# Patient Record
Sex: FEMALE | Race: WHITE | NOT HISPANIC OR LATINO | Employment: OTHER | RURAL
[De-identification: names, ages, dates, MRNs, and addresses within clinical notes are randomized per-mention and may not be internally consistent; named-entity substitution may affect disease eponyms.]

---

## 2020-04-01 ENCOUNTER — HISTORICAL (OUTPATIENT)
Dept: ADMINISTRATIVE | Facility: HOSPITAL | Age: 65
End: 2020-04-01

## 2020-04-01 LAB — VALPROATE SERPL-MCNC: 63 UG/ML (ref 50–100)

## 2020-04-15 ENCOUNTER — HISTORICAL (OUTPATIENT)
Dept: ADMINISTRATIVE | Facility: HOSPITAL | Age: 65
End: 2020-04-15

## 2020-04-15 LAB
ALBUMIN SERPL BCP-MCNC: 2.7 G/DL (ref 3.5–5)
ALBUMIN/GLOB SERPL: 0.8 {RATIO}
ALP SERPL-CCNC: 49 U/L (ref 55–142)
ALT SERPL W P-5'-P-CCNC: 10 U/L (ref 13–56)
AST SERPL W P-5'-P-CCNC: 11 U/L (ref 15–37)
BASOPHILS # BLD AUTO: 0.01 X10E3/UL (ref 0–0.2)
BASOPHILS NFR BLD AUTO: 0.2 % (ref 0–1)
BILIRUB SERPL-MCNC: 0.4 MG/DL (ref 0–1.2)
BUN SERPL-MCNC: 11 MG/DL (ref 7–18)
BUN/CREAT SERPL: 19
CALCIUM SERPL-MCNC: 8.1 MG/DL (ref 8.5–10.1)
CHLORIDE SERPL-SCNC: 110 MMOL/L (ref 98–107)
CO2 SERPL-SCNC: 25 MMOL/L (ref 21–32)
CREAT SERPL-MCNC: 0.58 MG/DL (ref 0.5–1.02)
EOSINOPHIL # BLD AUTO: 0.06 X10E3/UL (ref 0–0.5)
EOSINOPHIL NFR BLD AUTO: 1 % (ref 1–4)
ERYTHROCYTE [DISTWIDTH] IN BLOOD BY AUTOMATED COUNT: 12.4 % (ref 11.5–14.5)
GLOBULIN SER-MCNC: 3.3 G/DL (ref 2–4)
GLUCOSE SERPL-MCNC: 81 MG/DL (ref 74–106)
HCT VFR BLD AUTO: 37.5 % (ref 38–47)
HGB BLD-MCNC: 12 G/DL (ref 12–16)
IMM GRANULOCYTES # BLD AUTO: 0.02 X10E3/UL (ref 0–0.04)
IMM GRANULOCYTES NFR BLD: 0.3 % (ref 0–0.4)
LDH SERPL-CCNC: 130 U/L (ref 84–246)
LYMPHOCYTES # BLD AUTO: 1.68 X10E3/UL (ref 1–4.8)
LYMPHOCYTES NFR BLD AUTO: 28.2 % (ref 27–41)
MCH RBC QN AUTO: 32 PG (ref 27–31)
MCHC RBC AUTO-ENTMCNC: 32 G/DL (ref 32–36)
MCV RBC AUTO: 100 FL (ref 80–96)
MONOCYTES # BLD AUTO: 0.78 X10E3/UL (ref 0–0.8)
MONOCYTES NFR BLD AUTO: 13.1 % (ref 2–6)
MPC BLD CALC-MCNC: 11.8 FL (ref 9.4–12.4)
NEUTROPHILS # BLD AUTO: 3.41 X10E3/UL (ref 1.8–7.7)
NEUTROPHILS NFR BLD AUTO: 57.2 % (ref 53–65)
NRBC # BLD AUTO: 0 X10E3/UL (ref 0–0)
NRBC, AUTO (.00): 0 /100 (ref 0–0)
PLATELET # BLD AUTO: 109 X10E3/UL (ref 150–400)
POTASSIUM SERPL-SCNC: 3.5 MMOL/L (ref 3.5–5.1)
PROT SERPL-MCNC: 6 G/DL (ref 6.4–8.2)
RBC # BLD AUTO: 3.75 X10E6/UL (ref 4.2–5.4)
SODIUM SERPL-SCNC: 142 MMOL/L (ref 136–145)
WBC # BLD AUTO: 5.96 X10E3/UL (ref 4.5–11)

## 2020-04-21 ENCOUNTER — HISTORICAL (OUTPATIENT)
Dept: ADMINISTRATIVE | Facility: HOSPITAL | Age: 65
End: 2020-04-21

## 2020-04-21 LAB
ALBUMIN SERPL BCP-MCNC: 2.8 G/DL (ref 3.5–5)
ALBUMIN/GLOB SERPL: 0.9 {RATIO}
ALP SERPL-CCNC: 55 U/L (ref 55–142)
ALT SERPL W P-5'-P-CCNC: 15 U/L (ref 13–56)
AST SERPL W P-5'-P-CCNC: 17 U/L (ref 15–37)
BASOPHILS # BLD AUTO: 0.01 X10E3/UL (ref 0–0.2)
BASOPHILS NFR BLD AUTO: 0.2 % (ref 0–1)
BILIRUB SERPL-MCNC: 0.3 MG/DL (ref 0–1.2)
BUN SERPL-MCNC: 10 MG/DL (ref 7–18)
BUN/CREAT SERPL: 17
CALCIUM SERPL-MCNC: 8.2 MG/DL (ref 8.5–10.1)
CHLORIDE SERPL-SCNC: 107 MMOL/L (ref 98–107)
CO2 SERPL-SCNC: 27 MMOL/L (ref 21–32)
CREAT SERPL-MCNC: 0.58 MG/DL (ref 0.5–1.02)
EOSINOPHIL # BLD AUTO: 0.08 X10E3/UL (ref 0–0.5)
EOSINOPHIL NFR BLD AUTO: 1.3 % (ref 1–4)
ERYTHROCYTE [DISTWIDTH] IN BLOOD BY AUTOMATED COUNT: 12.1 % (ref 11.5–14.5)
GLOBULIN SER-MCNC: 3 G/DL (ref 2–4)
GLUCOSE SERPL-MCNC: 89 MG/DL (ref 74–106)
HCT VFR BLD AUTO: 37 % (ref 38–47)
HGB BLD-MCNC: 12.2 G/DL (ref 12–16)
IMM GRANULOCYTES # BLD AUTO: 0.01 X10E3/UL (ref 0–0.04)
IMM GRANULOCYTES NFR BLD: 0.2 % (ref 0–0.4)
LYMPHOCYTES # BLD AUTO: 1.54 X10E3/UL (ref 1–4.8)
LYMPHOCYTES NFR BLD AUTO: 24.3 % (ref 27–41)
MCH RBC QN AUTO: 32.1 PG (ref 27–31)
MCHC RBC AUTO-ENTMCNC: 33 G/DL (ref 32–36)
MCV RBC AUTO: 97.4 FL (ref 80–96)
MONOCYTES # BLD AUTO: 0.81 X10E3/UL (ref 0–0.8)
MONOCYTES NFR BLD AUTO: 12.8 % (ref 2–6)
MPC BLD CALC-MCNC: 11.4 FL (ref 9.4–12.4)
NEUTROPHILS # BLD AUTO: 3.88 X10E3/UL (ref 1.8–7.7)
NEUTROPHILS NFR BLD AUTO: 61.2 % (ref 53–65)
NRBC # BLD AUTO: 0 X10E3/UL (ref 0–0)
NRBC, AUTO (.00): 0 /100 (ref 0–0)
PLATELET # BLD AUTO: 171 X10E3/UL (ref 150–400)
POTASSIUM SERPL-SCNC: 3.9 MMOL/L (ref 3.5–5.1)
PROT SERPL-MCNC: 5.8 G/DL (ref 6.4–8.2)
RBC # BLD AUTO: 3.8 X10E6/UL (ref 4.2–5.4)
SODIUM SERPL-SCNC: 141 MMOL/L (ref 136–145)
WBC # BLD AUTO: 6.33 X10E3/UL (ref 4.5–11)

## 2020-05-18 ENCOUNTER — HISTORICAL (OUTPATIENT)
Dept: ADMINISTRATIVE | Facility: HOSPITAL | Age: 65
End: 2020-05-18

## 2020-05-18 LAB
ALBUMIN SERPL BCP-MCNC: 3.2 G/DL (ref 3.5–5)
ALBUMIN/GLOB SERPL: 1.1 {RATIO}
ALP SERPL-CCNC: 63 U/L (ref 55–142)
ALT SERPL W P-5'-P-CCNC: 15 U/L (ref 13–56)
AST SERPL W P-5'-P-CCNC: 21 U/L (ref 15–37)
BASOPHILS # BLD AUTO: 0 X10E3/UL (ref 0–0.2)
BASOPHILS NFR BLD AUTO: 0 % (ref 0–1)
BILIRUB SERPL-MCNC: 0.3 MG/DL (ref 0–1.2)
BUN SERPL-MCNC: 10 MG/DL (ref 7–18)
BUN/CREAT SERPL: 11
CALCIUM SERPL-MCNC: 8.6 MG/DL (ref 8.5–10.1)
CHLORIDE SERPL-SCNC: 107 MMOL/L (ref 98–107)
CO2 SERPL-SCNC: 24 MMOL/L (ref 21–32)
CREAT SERPL-MCNC: 0.9 MG/DL (ref 0.5–1.02)
EOSINOPHIL # BLD AUTO: 0.13 X10E3/UL (ref 0–0.5)
EOSINOPHIL NFR BLD AUTO: 3.8 % (ref 1–4)
ERYTHROCYTE [DISTWIDTH] IN BLOOD BY AUTOMATED COUNT: 12.4 % (ref 11.5–14.5)
GLOBULIN SER-MCNC: 2.9 G/DL (ref 2–4)
GLUCOSE SERPL-MCNC: 83 MG/DL (ref 74–106)
HCT VFR BLD AUTO: 38.2 % (ref 38–47)
HGB BLD-MCNC: 12.5 G/DL (ref 12–16)
IMM GRANULOCYTES # BLD AUTO: 0 X10E3/UL (ref 0–0.04)
IMM GRANULOCYTES NFR BLD: 0 % (ref 0–0.4)
LYMPHOCYTES # BLD AUTO: 1.62 X10E3/UL (ref 1–4.8)
LYMPHOCYTES NFR BLD AUTO: 47.1 % (ref 27–41)
MCH RBC QN AUTO: 32 PG (ref 27–31)
MCHC RBC AUTO-ENTMCNC: 32.7 G/DL (ref 32–36)
MCV RBC AUTO: 97.7 FL (ref 80–96)
MONOCYTES # BLD AUTO: 0.35 X10E3/UL (ref 0–0.8)
MONOCYTES NFR BLD AUTO: 10.2 % (ref 2–6)
MPC BLD CALC-MCNC: 12.1 FL (ref 9.4–12.4)
NEUTROPHILS # BLD AUTO: 1.34 X10E3/UL (ref 1.8–7.7)
NEUTROPHILS NFR BLD AUTO: 38.9 % (ref 53–65)
NRBC # BLD AUTO: 0 X10E3/UL (ref 0–0)
NRBC, AUTO (.00): 0 /100 (ref 0–0)
PLATELET # BLD AUTO: 117 X10E3/UL (ref 150–400)
POTASSIUM SERPL-SCNC: 4.2 MMOL/L (ref 3.5–5.1)
PROT SERPL-MCNC: 6.1 G/DL (ref 6.4–8.2)
RBC # BLD AUTO: 3.91 X10E6/UL (ref 4.2–5.4)
SODIUM SERPL-SCNC: 141 MMOL/L (ref 136–145)
VALPROATE SERPL-MCNC: 52 UG/ML (ref 50–100)
WBC # BLD AUTO: 3.44 X10E3/UL (ref 4.5–11)

## 2020-05-20 ENCOUNTER — HISTORICAL (OUTPATIENT)
Dept: ADMINISTRATIVE | Facility: HOSPITAL | Age: 65
End: 2020-05-20

## 2020-05-20 LAB — VALPROATE SERPL-MCNC: 25 UG/ML (ref 50–100)

## 2020-06-01 ENCOUNTER — HISTORICAL (OUTPATIENT)
Dept: ADMINISTRATIVE | Facility: HOSPITAL | Age: 65
End: 2020-06-01

## 2020-06-01 LAB
BASOPHILS # BLD AUTO: 0.02 X10E3/UL (ref 0–0.2)
BASOPHILS NFR BLD AUTO: 0.3 % (ref 0–1)
BUN SERPL-MCNC: 13 MG/DL (ref 7–18)
CALCIUM SERPL-MCNC: 9.4 MG/DL (ref 8.5–10.1)
CHLORIDE SERPL-SCNC: 104 MMOL/L (ref 98–107)
CO2 SERPL-SCNC: 30 MMOL/L (ref 21–32)
CREAT SERPL-MCNC: 0.89 MG/DL (ref 0.55–1.02)
EOSINOPHIL # BLD AUTO: 0.08 X10E3/UL (ref 0–0.5)
EOSINOPHIL NFR BLD AUTO: 1.2 % (ref 1–4)
ERYTHROCYTE [DISTWIDTH] IN BLOOD BY AUTOMATED COUNT: 12.2 % (ref 11.5–14.5)
GLUCOSE SERPL-MCNC: 117 MG/DL (ref 74–106)
HCT VFR BLD AUTO: 42.7 % (ref 38–47)
HGB BLD-MCNC: 13.7 G/DL (ref 12–16)
IMM GRANULOCYTES # BLD AUTO: 0.03 X10E3/UL (ref 0–0.04)
IMM GRANULOCYTES NFR BLD: 0.5 % (ref 0–0.4)
LYMPHOCYTES # BLD AUTO: 1.33 X10E3/UL (ref 1–4.8)
LYMPHOCYTES NFR BLD AUTO: 20 % (ref 27–41)
MCH RBC QN AUTO: 32.2 PG (ref 27–31)
MCHC RBC AUTO-ENTMCNC: 32.1 G/DL (ref 32–36)
MCV RBC AUTO: 100.5 FL (ref 80–96)
MONOCYTES # BLD AUTO: 0.54 X10E3/UL (ref 0–0.8)
MONOCYTES NFR BLD AUTO: 8.1 % (ref 2–6)
MPC BLD CALC-MCNC: 11 FL (ref 9.4–12.4)
NEUTROPHILS # BLD AUTO: 4.65 X10E3/UL (ref 1.8–7.7)
NEUTROPHILS NFR BLD AUTO: 69.9 % (ref 53–65)
NRBC # BLD AUTO: 0 X10E3/UL (ref 0–0)
NRBC, AUTO (.00): 0 /100 (ref 0–0)
PLATELET # BLD AUTO: 174 X10E3/UL (ref 150–400)
POTASSIUM SERPL-SCNC: 3.7 MMOL/L (ref 3.5–5.1)
RBC # BLD AUTO: 4.25 X10E6/UL (ref 4.2–5.4)
SODIUM SERPL-SCNC: 139 MMOL/L (ref 136–145)
VALPROATE SERPL-MCNC: 44 UG/ML (ref 50–150)
WBC # BLD AUTO: 6.65 X10E3/UL (ref 4.5–11)

## 2020-06-02 ENCOUNTER — HISTORICAL (OUTPATIENT)
Dept: ADMINISTRATIVE | Facility: HOSPITAL | Age: 65
End: 2020-06-02

## 2020-06-03 LAB
ALBUMIN SERPL BCP-MCNC: 3.2 G/DL (ref 3.5–5)
ALBUMIN/GLOB SERPL: 1.1 {RATIO}
ALP SERPL-CCNC: 61 U/L (ref 55–142)
ALT SERPL W P-5'-P-CCNC: 15 U/L (ref 13–56)
AST SERPL W P-5'-P-CCNC: 20 U/L (ref 15–37)
BASOPHILS # BLD AUTO: 0.02 X10E3/UL (ref 0–0.2)
BASOPHILS NFR BLD AUTO: 0.4 % (ref 0–1)
BILIRUB SERPL-MCNC: 0.4 MG/DL (ref 0–1.2)
BUN SERPL-MCNC: 15 MG/DL (ref 7–18)
BUN/CREAT SERPL: 18
CALCIUM SERPL-MCNC: 8.3 MG/DL (ref 8.5–10.1)
CHLORIDE SERPL-SCNC: 107 MMOL/L (ref 98–107)
CO2 SERPL-SCNC: 28 MMOL/L (ref 21–32)
CREAT SERPL-MCNC: 0.85 MG/DL (ref 0.5–1.02)
EOSINOPHIL # BLD AUTO: 0.13 X10E3/UL (ref 0–0.5)
EOSINOPHIL NFR BLD AUTO: 2.5 % (ref 1–4)
ERYTHROCYTE [DISTWIDTH] IN BLOOD BY AUTOMATED COUNT: 12.7 % (ref 11.5–14.5)
GLOBULIN SER-MCNC: 2.8 G/DL (ref 2–4)
GLUCOSE SERPL-MCNC: 86 MG/DL (ref 74–106)
HCT VFR BLD AUTO: 36.3 % (ref 38–47)
HGB BLD-MCNC: 11.7 G/DL (ref 12–16)
IMM GRANULOCYTES # BLD AUTO: 0.01 X10E3/UL (ref 0–0.04)
IMM GRANULOCYTES NFR BLD: 0.2 % (ref 0–0.4)
LYMPHOCYTES # BLD AUTO: 1.93 X10E3/UL (ref 1–4.8)
LYMPHOCYTES NFR BLD AUTO: 37.6 % (ref 27–41)
MCH RBC QN AUTO: 32.3 PG (ref 27–31)
MCHC RBC AUTO-ENTMCNC: 32.2 G/DL (ref 32–36)
MCV RBC AUTO: 100.3 FL (ref 80–96)
MONOCYTES # BLD AUTO: 0.43 X10E3/UL (ref 0–0.8)
MONOCYTES NFR BLD AUTO: 8.4 % (ref 2–6)
MPC BLD CALC-MCNC: 12.3 FL (ref 9.4–12.4)
NEUTROPHILS # BLD AUTO: 2.61 X10E3/UL (ref 1.8–7.7)
NEUTROPHILS NFR BLD AUTO: 50.9 % (ref 53–65)
NRBC # BLD AUTO: 0 X10E3/UL (ref 0–0)
NRBC, AUTO (.00): 0 /100 (ref 0–0)
PLATELET # BLD AUTO: 182 X10E3/UL (ref 150–400)
POTASSIUM SERPL-SCNC: 4.1 MMOL/L (ref 3.5–5.1)
PROT SERPL-MCNC: 6 G/DL (ref 6.4–8.2)
RBC # BLD AUTO: 3.62 X10E6/UL (ref 4.2–5.4)
SODIUM SERPL-SCNC: 140 MMOL/L (ref 136–145)
VALPROATE SERPL-MCNC: 48 UG/ML (ref 50–100)
WBC # BLD AUTO: 5.13 X10E3/UL (ref 4.5–11)

## 2020-06-15 ENCOUNTER — HISTORICAL (OUTPATIENT)
Dept: ADMINISTRATIVE | Facility: HOSPITAL | Age: 65
End: 2020-06-15

## 2020-06-15 LAB
ALBUMIN SERPL BCP-MCNC: 3 G/DL (ref 3.5–5)
ALBUMIN/GLOB SERPL: 1.1 {RATIO}
ALP SERPL-CCNC: 56 U/L (ref 55–142)
ALT SERPL W P-5'-P-CCNC: 8 U/L (ref 13–56)
AST SERPL W P-5'-P-CCNC: 12 U/L (ref 15–37)
BASOPHILS # BLD AUTO: 0.02 X10E3/UL (ref 0–0.2)
BASOPHILS NFR BLD AUTO: 0.3 % (ref 0–1)
BILIRUB SERPL-MCNC: 0.4 MG/DL (ref 0–1.2)
BUN SERPL-MCNC: 13 MG/DL (ref 7–18)
BUN/CREAT SERPL: 19
CALCIUM SERPL-MCNC: 8.1 MG/DL (ref 8.5–10.1)
CHLORIDE SERPL-SCNC: 108 MMOL/L (ref 98–107)
CHOLEST SERPL-MCNC: 161 MG/DL
CHOLEST/HDLC SERPL: 3.6 {RATIO}
CO2 SERPL-SCNC: 27 MMOL/L (ref 21–32)
CREAT SERPL-MCNC: 0.68 MG/DL (ref 0.5–1.02)
EOSINOPHIL # BLD AUTO: 0.14 X10E3/UL (ref 0–0.5)
EOSINOPHIL NFR BLD AUTO: 2.3 % (ref 1–4)
ERYTHROCYTE [DISTWIDTH] IN BLOOD BY AUTOMATED COUNT: 12.5 % (ref 11.5–14.5)
EST. AVERAGE GLUCOSE BLD GHB EST-MCNC: 87 MG/DL
GLOBULIN SER-MCNC: 2.7 G/DL (ref 2–4)
GLUCOSE SERPL-MCNC: 77 MG/DL (ref 74–106)
HBA1C MFR BLD HPLC: 5.2 %
HCT VFR BLD AUTO: 38.2 % (ref 38–47)
HDLC SERPL-MCNC: 45 MG/DL
HGB BLD-MCNC: 12.4 G/DL (ref 12–16)
IMM GRANULOCYTES # BLD AUTO: 0.02 X10E3/UL (ref 0–0.04)
IMM GRANULOCYTES NFR BLD: 0.3 % (ref 0–0.4)
LDLC SERPL CALC-MCNC: 103 MG/DL
LYMPHOCYTES # BLD AUTO: 1.62 X10E3/UL (ref 1–4.8)
LYMPHOCYTES NFR BLD AUTO: 26.1 % (ref 27–41)
MCH RBC QN AUTO: 31.8 PG (ref 27–31)
MCHC RBC AUTO-ENTMCNC: 32.5 G/DL (ref 32–36)
MCV RBC AUTO: 97.9 FL (ref 80–96)
MONOCYTES # BLD AUTO: 0.61 X10E3/UL (ref 0–0.8)
MONOCYTES NFR BLD AUTO: 9.8 % (ref 2–6)
MPC BLD CALC-MCNC: 11.9 FL (ref 9.4–12.4)
NEUTROPHILS # BLD AUTO: 3.8 X10E3/UL (ref 1.8–7.7)
NEUTROPHILS NFR BLD AUTO: 61.2 % (ref 53–65)
NRBC # BLD AUTO: 0 X10E3/UL (ref 0–0)
NRBC, AUTO (.00): 0 /100 (ref 0–0)
PLATELET # BLD AUTO: 148 X10E3/UL (ref 150–400)
POTASSIUM SERPL-SCNC: 4 MMOL/L (ref 3.5–5.1)
PROT SERPL-MCNC: 5.7 G/DL (ref 6.4–8.2)
RBC # BLD AUTO: 3.9 X10E6/UL (ref 4.2–5.4)
SODIUM SERPL-SCNC: 140 MMOL/L (ref 136–145)
TRIGL SERPL-MCNC: 65 MG/DL
VALPROATE SERPL-MCNC: 96 UG/ML (ref 50–100)
WBC # BLD AUTO: 6.21 X10E3/UL (ref 4.5–11)

## 2020-07-08 ENCOUNTER — HISTORICAL (OUTPATIENT)
Dept: ADMINISTRATIVE | Facility: HOSPITAL | Age: 65
End: 2020-07-08

## 2020-07-08 LAB — VALPROATE SERPL-MCNC: 86 UG/ML (ref 50–100)

## 2020-08-03 ENCOUNTER — HISTORICAL (OUTPATIENT)
Dept: ADMINISTRATIVE | Facility: HOSPITAL | Age: 65
End: 2020-08-03

## 2020-08-03 LAB — VALPROATE SERPL-MCNC: 100 UG/ML (ref 50–100)

## 2020-08-07 ENCOUNTER — HISTORICAL (OUTPATIENT)
Dept: ADMINISTRATIVE | Facility: HOSPITAL | Age: 65
End: 2020-08-07

## 2020-08-07 LAB — SARS-COV+SARS-COV-2 AG RESP QL IA.RAPID: NEGATIVE

## 2020-08-13 ENCOUNTER — HISTORICAL (OUTPATIENT)
Dept: ADMINISTRATIVE | Facility: HOSPITAL | Age: 65
End: 2020-08-13

## 2020-08-14 LAB — SARS-COV+SARS-COV-2 AG RESP QL IA.RAPID: NEGATIVE

## 2020-08-19 ENCOUNTER — HISTORICAL (OUTPATIENT)
Dept: ADMINISTRATIVE | Facility: HOSPITAL | Age: 65
End: 2020-08-19

## 2020-08-20 LAB — SARS-COV+SARS-COV-2 AG RESP QL IA.RAPID: NEGATIVE

## 2020-08-26 ENCOUNTER — HISTORICAL (OUTPATIENT)
Dept: ADMINISTRATIVE | Facility: HOSPITAL | Age: 65
End: 2020-08-26

## 2020-08-27 ENCOUNTER — HISTORICAL (OUTPATIENT)
Dept: ADMINISTRATIVE | Facility: HOSPITAL | Age: 65
End: 2020-08-27

## 2020-08-27 LAB
SARS-COV+SARS-COV-2 AG RESP QL IA.RAPID: NEGATIVE
VALPROATE SERPL-MCNC: 81 UG/ML (ref 50–100)

## 2020-09-01 ENCOUNTER — HISTORICAL (OUTPATIENT)
Dept: ADMINISTRATIVE | Facility: HOSPITAL | Age: 65
End: 2020-09-01

## 2020-09-01 LAB — VALPROATE SERPL-MCNC: 89 UG/ML (ref 50–100)

## 2020-09-02 ENCOUNTER — HISTORICAL (OUTPATIENT)
Dept: ADMINISTRATIVE | Facility: HOSPITAL | Age: 65
End: 2020-09-02

## 2020-09-03 LAB — SARS-COV+SARS-COV-2 AG RESP QL IA.RAPID: NEGATIVE

## 2020-09-09 ENCOUNTER — HISTORICAL (OUTPATIENT)
Dept: ADMINISTRATIVE | Facility: HOSPITAL | Age: 65
End: 2020-09-09

## 2020-09-10 LAB — SARS-COV+SARS-COV-2 AG RESP QL IA.RAPID: NEGATIVE

## 2020-09-16 ENCOUNTER — HISTORICAL (OUTPATIENT)
Dept: ADMINISTRATIVE | Facility: HOSPITAL | Age: 65
End: 2020-09-16

## 2020-09-17 LAB — SARS-COV+SARS-COV-2 AG RESP QL IA.RAPID: NEGATIVE

## 2020-09-24 ENCOUNTER — HISTORICAL (OUTPATIENT)
Dept: ADMINISTRATIVE | Facility: HOSPITAL | Age: 65
End: 2020-09-24

## 2020-09-25 LAB — SARS-COV+SARS-COV-2 AG RESP QL IA.RAPID: NEGATIVE

## 2020-09-30 ENCOUNTER — HISTORICAL (OUTPATIENT)
Dept: ADMINISTRATIVE | Facility: HOSPITAL | Age: 65
End: 2020-09-30

## 2020-09-30 LAB — SARS-COV+SARS-COV-2 AG RESP QL IA.RAPID: NEGATIVE

## 2020-10-01 ENCOUNTER — HISTORICAL (OUTPATIENT)
Dept: ADMINISTRATIVE | Facility: HOSPITAL | Age: 65
End: 2020-10-01

## 2020-10-01 LAB — VALPROATE SERPL-MCNC: 113 UG/ML (ref 50–100)

## 2020-10-07 ENCOUNTER — HISTORICAL (OUTPATIENT)
Dept: ADMINISTRATIVE | Facility: HOSPITAL | Age: 65
End: 2020-10-07

## 2020-10-08 LAB — SARS-COV+SARS-COV-2 AG RESP QL IA.RAPID: NEGATIVE

## 2020-10-13 ENCOUNTER — HISTORICAL (OUTPATIENT)
Dept: ADMINISTRATIVE | Facility: HOSPITAL | Age: 65
End: 2020-10-13

## 2020-10-13 LAB
ALBUMIN SERPL BCP-MCNC: 3.4 G/DL (ref 3.5–5)
ALBUMIN/GLOB SERPL: 1 {RATIO}
ALP SERPL-CCNC: 62 U/L (ref 55–142)
ALT SERPL W P-5'-P-CCNC: 23 U/L (ref 13–56)
ANION GAP SERPL CALCULATED.3IONS-SCNC: 11 MMOL/L (ref 7–16)
AST SERPL W P-5'-P-CCNC: 21 U/L (ref 15–37)
BASOPHILS # BLD AUTO: 0.02 X10E3/UL (ref 0–0.2)
BASOPHILS NFR BLD AUTO: 0.4 % (ref 0–1)
BILIRUB SERPL-MCNC: 0.4 MG/DL (ref 0–1.2)
BUN SERPL-MCNC: 18 MG/DL (ref 7–18)
BUN/CREAT SERPL: 21
CALCIUM SERPL-MCNC: 8.8 MG/DL (ref 8.5–10.1)
CHLORIDE SERPL-SCNC: 106 MMOL/L (ref 98–107)
CO2 SERPL-SCNC: 27 MMOL/L (ref 21–32)
CREAT SERPL-MCNC: 0.87 MG/DL (ref 0.5–1.02)
EOSINOPHIL # BLD AUTO: 0.1 X10E3/UL (ref 0–0.5)
EOSINOPHIL NFR BLD AUTO: 2 % (ref 1–4)
ERYTHROCYTE [DISTWIDTH] IN BLOOD BY AUTOMATED COUNT: 12.9 % (ref 11.5–14.5)
GLOBULIN SER-MCNC: 3.3 G/DL (ref 2–4)
GLUCOSE SERPL-MCNC: 97 MG/DL (ref 74–106)
HCT VFR BLD AUTO: 41.4 % (ref 38–47)
HGB BLD-MCNC: 13.5 G/DL (ref 12–16)
IMM GRANULOCYTES # BLD AUTO: 0 X10E3/UL (ref 0–0.04)
IMM GRANULOCYTES NFR BLD: 0 % (ref 0–0.4)
LYMPHOCYTES # BLD AUTO: 1.67 X10E3/UL (ref 1–4.8)
LYMPHOCYTES NFR BLD AUTO: 33.5 % (ref 27–41)
MCH RBC QN AUTO: 32.8 PG (ref 27–31)
MCHC RBC AUTO-ENTMCNC: 32.6 G/DL (ref 32–36)
MCV RBC AUTO: 100.7 FL (ref 80–96)
MONOCYTES # BLD AUTO: 0.48 X10E3/UL (ref 0–0.8)
MONOCYTES NFR BLD AUTO: 9.6 % (ref 2–6)
MPC BLD CALC-MCNC: 11.9 FL (ref 9.4–12.4)
NEUTROPHILS # BLD AUTO: 2.71 X10E3/UL (ref 1.8–7.7)
NEUTROPHILS NFR BLD AUTO: 54.5 % (ref 53–65)
NRBC # BLD AUTO: 0 X10E3/UL (ref 0–0)
NRBC, AUTO (.00): 0 /100 (ref 0–0)
PLATELET # BLD AUTO: 148 X10E3/UL (ref 150–400)
POTASSIUM SERPL-SCNC: 4 MMOL/L (ref 3.5–5.1)
PROT SERPL-MCNC: 6.7 G/DL (ref 6.4–8.2)
RBC # BLD AUTO: 4.11 X10E6/UL (ref 4.2–5.4)
SODIUM SERPL-SCNC: 140 MMOL/L (ref 136–145)
VALPROATE SERPL-MCNC: 91 UG/ML (ref 50–100)
WBC # BLD AUTO: 4.98 X10E3/UL (ref 4.5–11)

## 2020-10-14 ENCOUNTER — HISTORICAL (OUTPATIENT)
Dept: ADMINISTRATIVE | Facility: HOSPITAL | Age: 65
End: 2020-10-14

## 2020-10-15 LAB — SARS-COV-2 RNA AMPLIFICATION, QUAL: NEGATIVE

## 2020-10-19 ENCOUNTER — HISTORICAL (OUTPATIENT)
Dept: ADMINISTRATIVE | Facility: HOSPITAL | Age: 65
End: 2020-10-19

## 2020-10-19 LAB — VALPROATE SERPL-MCNC: 90 UG/ML (ref 50–100)

## 2020-10-21 ENCOUNTER — HISTORICAL (OUTPATIENT)
Dept: ADMINISTRATIVE | Facility: HOSPITAL | Age: 65
End: 2020-10-21

## 2020-10-22 LAB — SARS-COV+SARS-COV-2 AG RESP QL IA.RAPID: NEGATIVE

## 2020-10-28 ENCOUNTER — HISTORICAL (OUTPATIENT)
Dept: ADMINISTRATIVE | Facility: HOSPITAL | Age: 65
End: 2020-10-28

## 2020-10-29 LAB — SARS-COV+SARS-COV-2 AG RESP QL IA.RAPID: NEGATIVE

## 2020-11-03 ENCOUNTER — HISTORICAL (OUTPATIENT)
Dept: ADMINISTRATIVE | Facility: HOSPITAL | Age: 65
End: 2020-11-03

## 2020-11-03 LAB — VALPROATE SERPL-MCNC: 76 UG/ML (ref 50–100)

## 2020-11-04 ENCOUNTER — HISTORICAL (OUTPATIENT)
Dept: ADMINISTRATIVE | Facility: HOSPITAL | Age: 65
End: 2020-11-04

## 2020-11-05 LAB — SARS-COV+SARS-COV-2 AG RESP QL IA.RAPID: NEGATIVE

## 2020-11-10 ENCOUNTER — HISTORICAL (OUTPATIENT)
Dept: ADMINISTRATIVE | Facility: HOSPITAL | Age: 65
End: 2020-11-10

## 2020-11-11 LAB — SARS-COV+SARS-COV-2 AG RESP QL IA.RAPID: NEGATIVE

## 2020-11-18 ENCOUNTER — HISTORICAL (OUTPATIENT)
Dept: ADMINISTRATIVE | Facility: HOSPITAL | Age: 65
End: 2020-11-18

## 2020-11-19 LAB — SARS-COV+SARS-COV-2 AG RESP QL IA.RAPID: NEGATIVE

## 2020-11-24 ENCOUNTER — HISTORICAL (OUTPATIENT)
Dept: ADMINISTRATIVE | Facility: HOSPITAL | Age: 65
End: 2020-11-24

## 2020-11-24 LAB — SARS-COV+SARS-COV-2 AG RESP QL IA.RAPID: NEGATIVE

## 2020-12-01 ENCOUNTER — HISTORICAL (OUTPATIENT)
Dept: ADMINISTRATIVE | Facility: HOSPITAL | Age: 65
End: 2020-12-01

## 2020-12-01 LAB
ALBUMIN SERPL BCP-MCNC: 2.9 G/DL (ref 3.5–5)
ALBUMIN/GLOB SERPL: 1 {RATIO}
ALP SERPL-CCNC: 54 U/L (ref 55–142)
ALT SERPL W P-5'-P-CCNC: 11 U/L (ref 13–56)
ANION GAP SERPL CALCULATED.3IONS-SCNC: 6.7 MMOL/L (ref 7–16)
AST SERPL W P-5'-P-CCNC: 12 U/L (ref 15–37)
BASOPHILS # BLD AUTO: 0.01 X10E3/UL (ref 0–0.2)
BASOPHILS NFR BLD AUTO: 0.1 % (ref 0–1)
BILIRUB DIRECT SERPL-MCNC: 0.1 MG/DL (ref 0–0.2)
BILIRUB SERPL-MCNC: 0.4 MG/DL (ref 0–1.2)
BUN SERPL-MCNC: 16 MG/DL (ref 7–18)
BUN/CREAT SERPL: 22
CALCIUM SERPL-MCNC: 8.3 MG/DL (ref 8.5–10.1)
CHLORIDE SERPL-SCNC: 109 MMOL/L (ref 98–107)
CHOLEST SERPL-MCNC: 192 MG/DL
CHOLEST/HDLC SERPL: 3.5 {RATIO}
CO2 SERPL-SCNC: 30 MMOL/L (ref 21–32)
CREAT SERPL-MCNC: 0.74 MG/DL (ref 0.5–1.02)
EOSINOPHIL # BLD AUTO: 0.19 X10E3/UL (ref 0–0.5)
EOSINOPHIL NFR BLD AUTO: 2.4 % (ref 1–4)
ERYTHROCYTE [DISTWIDTH] IN BLOOD BY AUTOMATED COUNT: 12.8 % (ref 11.5–14.5)
EST. AVERAGE GLUCOSE BLD GHB EST-MCNC: 90 MG/DL
GLOBULIN SER-MCNC: 2.9 G/DL (ref 2–4)
GLUCOSE SERPL-MCNC: 82 MG/DL (ref 74–106)
HBA1C MFR BLD HPLC: 5.3 %
HCT VFR BLD AUTO: 37.7 % (ref 38–47)
HDLC SERPL-MCNC: 55 MG/DL
HGB BLD-MCNC: 12.6 G/DL (ref 12–16)
IMM GRANULOCYTES # BLD AUTO: 0.01 X10E3/UL (ref 0–0.04)
IMM GRANULOCYTES NFR BLD: 0.1 % (ref 0–0.4)
LDLC SERPL CALC-MCNC: 119 MG/DL
LYMPHOCYTES # BLD AUTO: 1.75 X10E3/UL (ref 1–4.8)
LYMPHOCYTES NFR BLD AUTO: 22.3 % (ref 27–41)
MCH RBC QN AUTO: 33.3 PG (ref 27–31)
MCHC RBC AUTO-ENTMCNC: 33.4 G/DL (ref 32–36)
MCV RBC AUTO: 99.7 FL (ref 80–96)
MONOCYTES # BLD AUTO: 0.5 X10E3/UL (ref 0–0.8)
MONOCYTES NFR BLD AUTO: 6.4 % (ref 2–6)
MPC BLD CALC-MCNC: 11.4 FL (ref 9.4–12.4)
NEUTROPHILS # BLD AUTO: 5.39 X10E3/UL (ref 1.8–7.7)
NEUTROPHILS NFR BLD AUTO: 68.7 % (ref 53–65)
NRBC # BLD AUTO: 0 X10E3/UL (ref 0–0)
NRBC, AUTO (.00): 0 /100 (ref 0–0)
PLATELET # BLD AUTO: 172 X10E3/UL (ref 150–400)
POTASSIUM SERPL-SCNC: 3.7 MMOL/L (ref 3.5–5.1)
PROT SERPL-MCNC: 5.8 G/DL (ref 6.4–8.2)
RBC # BLD AUTO: 3.78 X10E6/UL (ref 4.2–5.4)
SODIUM SERPL-SCNC: 142 MMOL/L (ref 136–145)
TRIGL SERPL-MCNC: 92 MG/DL
VALPROATE SERPL-MCNC: 92 UG/ML (ref 50–100)
WBC # BLD AUTO: 7.85 X10E3/UL (ref 4.5–11)

## 2020-12-03 ENCOUNTER — HISTORICAL (OUTPATIENT)
Dept: ADMINISTRATIVE | Facility: HOSPITAL | Age: 65
End: 2020-12-03

## 2020-12-04 LAB — SARS-COV+SARS-COV-2 AG RESP QL IA.RAPID: NEGATIVE

## 2020-12-10 ENCOUNTER — HISTORICAL (OUTPATIENT)
Dept: ADMINISTRATIVE | Facility: HOSPITAL | Age: 65
End: 2020-12-10

## 2020-12-11 LAB — SARS-COV+SARS-COV-2 AG RESP QL IA.RAPID: NEGATIVE

## 2020-12-15 ENCOUNTER — HISTORICAL (OUTPATIENT)
Dept: ADMINISTRATIVE | Facility: HOSPITAL | Age: 65
End: 2020-12-15

## 2020-12-15 LAB
BACTERIA #/AREA URNS HPF: ABNORMAL /HPF
BILIRUB UR QL STRIP: NEGATIVE MG/DL
CLARITY UR: ABNORMAL
COLOR UR: YELLOW
GLUCOSE UR STRIP-MCNC: NEGATIVE MG/DL
KETONES UR STRIP-SCNC: NEGATIVE MG/DL
LEUKOCYTE ESTERASE UR QL STRIP: NEGATIVE LEU/UL
NITRITE UR QL STRIP: POSITIVE
PH UR STRIP: 7 PH UNITS (ref 5–8)
PROT UR QL STRIP: NEGATIVE MG/DL
RBC # UR STRIP: NEGATIVE ERY/UL
RBC #/AREA URNS HPF: ABNORMAL /HPF (ref 0–3)
SP GR UR STRIP: 1.02 (ref 1–1.03)
SQUAMOUS #/AREA URNS LPF: ABNORMAL /LPF
UROBILINOGEN UR STRIP-ACNC: 1 EU/DL
VALPROATE SERPL-MCNC: 89 UG/ML (ref 50–100)
WBC #/AREA URNS HPF: ABNORMAL /HPF (ref 0–5)

## 2020-12-16 ENCOUNTER — HISTORICAL (OUTPATIENT)
Dept: ADMINISTRATIVE | Facility: HOSPITAL | Age: 65
End: 2020-12-16

## 2020-12-17 LAB — SARS-COV+SARS-COV-2 AG RESP QL IA.RAPID: NEGATIVE

## 2020-12-19 LAB
REPORT: 38
REPORT: NORMAL

## 2020-12-21 ENCOUNTER — HISTORICAL (OUTPATIENT)
Dept: ADMINISTRATIVE | Facility: HOSPITAL | Age: 65
End: 2020-12-21

## 2020-12-22 LAB — SARS-COV+SARS-COV-2 AG RESP QL IA.RAPID: NEGATIVE

## 2020-12-29 ENCOUNTER — HISTORICAL (OUTPATIENT)
Dept: ADMINISTRATIVE | Facility: HOSPITAL | Age: 65
End: 2020-12-29

## 2020-12-30 LAB — SARS-COV+SARS-COV-2 AG RESP QL IA.RAPID: NEGATIVE

## 2021-01-04 ENCOUNTER — HISTORICAL (OUTPATIENT)
Dept: ADMINISTRATIVE | Facility: HOSPITAL | Age: 66
End: 2021-01-04

## 2021-01-05 LAB — SARS-COV+SARS-COV-2 AG RESP QL IA.RAPID: NEGATIVE

## 2021-01-11 ENCOUNTER — HISTORICAL (OUTPATIENT)
Dept: ADMINISTRATIVE | Facility: HOSPITAL | Age: 66
End: 2021-01-11

## 2021-01-12 LAB — SARS-COV+SARS-COV-2 AG RESP QL IA.RAPID: NEGATIVE

## 2021-01-13 ENCOUNTER — HISTORICAL (OUTPATIENT)
Dept: ADMINISTRATIVE | Facility: HOSPITAL | Age: 66
End: 2021-01-13

## 2021-01-13 LAB — VALPROATE SERPL-MCNC: 97 UG/ML (ref 50–100)

## 2021-01-18 ENCOUNTER — HISTORICAL (OUTPATIENT)
Dept: ADMINISTRATIVE | Facility: HOSPITAL | Age: 66
End: 2021-01-18

## 2021-01-19 LAB — SARS-COV+SARS-COV-2 AG RESP QL IA.RAPID: NEGATIVE

## 2021-01-25 ENCOUNTER — HISTORICAL (OUTPATIENT)
Dept: ADMINISTRATIVE | Facility: HOSPITAL | Age: 66
End: 2021-01-25

## 2021-01-26 LAB — SARS-COV+SARS-COV-2 AG RESP QL IA.RAPID: NEGATIVE

## 2021-02-01 ENCOUNTER — HISTORICAL (OUTPATIENT)
Dept: ADMINISTRATIVE | Facility: HOSPITAL | Age: 66
End: 2021-02-01

## 2021-02-01 LAB — VALPROATE SERPL-MCNC: 98 UG/ML (ref 50–100)

## 2021-02-23 ENCOUNTER — HISTORICAL (OUTPATIENT)
Dept: ADMINISTRATIVE | Facility: HOSPITAL | Age: 66
End: 2021-02-23

## 2021-02-24 LAB — VALPROATE SERPL-MCNC: 87 UG/ML (ref 50–100)

## 2021-02-26 ENCOUNTER — HISTORICAL (OUTPATIENT)
Dept: ADMINISTRATIVE | Facility: HOSPITAL | Age: 66
End: 2021-02-26

## 2021-02-26 LAB
ALBUMIN SERPL BCP-MCNC: 2.9 G/DL (ref 3.5–5)
ALBUMIN/GLOB SERPL: 1.2 {RATIO}
ALP SERPL-CCNC: 56 U/L (ref 55–142)
ALT SERPL W P-5'-P-CCNC: 15 U/L (ref 13–56)
ANION GAP SERPL CALCULATED.3IONS-SCNC: 11 MMOL/L (ref 7–16)
AST SERPL W P-5'-P-CCNC: 14 U/L (ref 15–37)
BASOPHILS # BLD AUTO: 0.02 X10E3/UL (ref 0–0.2)
BASOPHILS NFR BLD AUTO: 0.5 % (ref 0–1)
BILIRUB SERPL-MCNC: 0.2 MG/DL (ref 0–1.2)
BUN SERPL-MCNC: 20 MG/DL (ref 7–18)
BUN/CREAT SERPL: 31
CALCIUM SERPL-MCNC: 7.9 MG/DL (ref 8.5–10.1)
CHLORIDE SERPL-SCNC: 109 MMOL/L (ref 98–107)
CO2 SERPL-SCNC: 26 MMOL/L (ref 21–32)
CREAT SERPL-MCNC: 0.65 MG/DL (ref 0.5–1.02)
EOSINOPHIL # BLD AUTO: 0.14 X10E3/UL (ref 0–0.5)
EOSINOPHIL NFR BLD AUTO: 3.3 % (ref 1–4)
ERYTHROCYTE [DISTWIDTH] IN BLOOD BY AUTOMATED COUNT: 13.1 % (ref 11.5–14.5)
GLOBULIN SER-MCNC: 2.5 G/DL (ref 2–4)
GLUCOSE SERPL-MCNC: 72 MG/DL (ref 74–106)
HCT VFR BLD AUTO: 33.1 % (ref 38–47)
HGB BLD-MCNC: 11.1 G/DL (ref 12–16)
IMM GRANULOCYTES # BLD AUTO: 0.01 X10E3/UL (ref 0–0.04)
IMM GRANULOCYTES NFR BLD: 0.2 % (ref 0–0.4)
LYMPHOCYTES # BLD AUTO: 2.29 X10E3/UL (ref 1–4.8)
LYMPHOCYTES NFR BLD AUTO: 54.1 % (ref 27–41)
MCH RBC QN AUTO: 32.2 PG (ref 27–31)
MCHC RBC AUTO-ENTMCNC: 33.5 G/DL (ref 32–36)
MCV RBC AUTO: 95.9 FL (ref 80–96)
MONOCYTES # BLD AUTO: 0.34 X10E3/UL (ref 0–0.8)
MONOCYTES NFR BLD AUTO: 8 % (ref 2–6)
MPC BLD CALC-MCNC: 11.5 FL (ref 9.4–12.4)
NEUTROPHILS # BLD AUTO: 1.43 X10E3/UL (ref 1.8–7.7)
NEUTROPHILS NFR BLD AUTO: 33.9 % (ref 53–65)
NRBC # BLD AUTO: 0 X10E3/UL (ref 0–0)
NRBC, AUTO (.00): 0 /100 (ref 0–0)
PLATELET # BLD AUTO: 139 X10E3/UL (ref 150–400)
POTASSIUM SERPL-SCNC: 4 MMOL/L (ref 3.5–5.1)
PROT SERPL-MCNC: 5.4 G/DL (ref 6.4–8.2)
RBC # BLD AUTO: 3.45 X10E6/UL (ref 4.2–5.4)
SODIUM SERPL-SCNC: 142 MMOL/L (ref 136–145)
VALPROATE SERPL-MCNC: 95 UG/ML (ref 50–100)
WBC # BLD AUTO: 4.23 X10E3/UL (ref 4.5–11)

## 2021-03-01 ENCOUNTER — HISTORICAL (OUTPATIENT)
Dept: ADMINISTRATIVE | Facility: HOSPITAL | Age: 66
End: 2021-03-01

## 2021-03-01 LAB — VALPROATE SERPL-MCNC: 103 UG/ML (ref 50–100)

## 2021-03-03 ENCOUNTER — HISTORICAL (OUTPATIENT)
Dept: ADMINISTRATIVE | Facility: HOSPITAL | Age: 66
End: 2021-03-03

## 2021-03-03 LAB — AMMONIA PLAS-SCNC: 42 UMOL/L (ref 11–32)

## 2021-04-06 ENCOUNTER — HISTORICAL (OUTPATIENT)
Dept: ADMINISTRATIVE | Facility: HOSPITAL | Age: 66
End: 2021-04-06

## 2021-04-07 LAB
ANION GAP SERPL CALCULATED.3IONS-SCNC: 15.4 MMOL/L (ref 7–16)
BUN SERPL-MCNC: 17 MG/DL (ref 7–18)
CALCIUM SERPL-MCNC: 8.1 MG/DL (ref 8.5–10.1)
CHLORIDE SERPL-SCNC: 106 MMOL/L (ref 98–107)
CO2 SERPL-SCNC: 22 MMOL/L (ref 21–32)
CREAT SERPL-MCNC: 0.9 MG/DL (ref 0.5–1.02)
GLUCOSE SERPL-MCNC: 118 MG/DL (ref 74–106)
POTASSIUM SERPL-SCNC: 4.4 MMOL/L (ref 3.5–5.1)
SODIUM SERPL-SCNC: 139 MMOL/L (ref 136–145)
VALPROATE SERPL-MCNC: 85 UG/ML (ref 50–100)

## 2021-08-25 ENCOUNTER — OFFICE VISIT (OUTPATIENT)
Dept: NEUROLOGY | Facility: CLINIC | Age: 66
End: 2021-08-25
Payer: MEDICARE

## 2021-08-25 VITALS
OXYGEN SATURATION: 98 % | HEART RATE: 102 BPM | HEIGHT: 65 IN | WEIGHT: 123.81 LBS | BODY MASS INDEX: 20.63 KG/M2 | SYSTOLIC BLOOD PRESSURE: 116 MMHG | DIASTOLIC BLOOD PRESSURE: 70 MMHG

## 2021-08-25 DIAGNOSIS — R56.9 CONVULSIONS, UNSPECIFIED CONVULSION TYPE: Primary | ICD-10-CM

## 2021-08-25 DIAGNOSIS — Z79.899 ON LONG TERM DRUG THERAPY: ICD-10-CM

## 2021-08-25 DIAGNOSIS — R26.89 BALANCE PROBLEM: ICD-10-CM

## 2021-08-25 PROCEDURE — 99213 OFFICE O/P EST LOW 20 MIN: CPT | Mod: S$PBB,,, | Performed by: NURSE PRACTITIONER

## 2021-08-25 PROCEDURE — 99214 OFFICE O/P EST MOD 30 MIN: CPT | Mod: PBBFAC | Performed by: NURSE PRACTITIONER

## 2021-08-25 PROCEDURE — 99213 PR OFFICE/OUTPT VISIT, EST, LEVL III, 20-29 MIN: ICD-10-PCS | Mod: S$PBB,,, | Performed by: NURSE PRACTITIONER

## 2021-08-25 RX ORDER — DIVALPROEX SODIUM 500 MG/1
250 TABLET, DELAYED RELEASE ORAL DAILY
Status: ON HOLD | COMMUNITY
Start: 2021-08-17 | End: 2021-09-27 | Stop reason: HOSPADM

## 2021-08-25 RX ORDER — LACTULOSE 10 G/15ML
60 SOLUTION ORAL 4 TIMES DAILY
Status: ON HOLD | COMMUNITY
Start: 2021-07-15 | End: 2023-05-22 | Stop reason: HOSPADM

## 2021-08-25 RX ORDER — PANTOPRAZOLE SODIUM 40 MG/1
TABLET, DELAYED RELEASE ORAL
COMMUNITY
Start: 2021-08-06 | End: 2022-01-26 | Stop reason: SDUPTHER

## 2021-08-25 RX ORDER — PALIPERIDONE PALMITATE 117 MG/.75ML
117 INJECTION INTRAMUSCULAR
COMMUNITY
Start: 2021-09-15

## 2021-08-25 RX ORDER — DOCUSATE SODIUM 100 MG/1
100 CAPSULE, LIQUID FILLED ORAL 2 TIMES DAILY
COMMUNITY

## 2021-08-25 RX ORDER — FOLIC ACID 1 MG/1
1 TABLET ORAL DAILY
COMMUNITY

## 2021-09-01 ENCOUNTER — HOSPITAL ENCOUNTER (EMERGENCY)
Facility: HOSPITAL | Age: 66
Discharge: HOME | End: 2021-09-01
Attending: EMERGENCY MEDICINE
Payer: MEDICARE

## 2021-09-01 VITALS
HEART RATE: 97 BPM | BODY MASS INDEX: 21.35 KG/M2 | DIASTOLIC BLOOD PRESSURE: 72 MMHG | RESPIRATION RATE: 18 BRPM | WEIGHT: 136 LBS | OXYGEN SATURATION: 97 % | SYSTOLIC BLOOD PRESSURE: 142 MMHG | TEMPERATURE: 99 F | HEIGHT: 67 IN

## 2021-09-01 DIAGNOSIS — R56.9 CONVULSIONS: Chronic | ICD-10-CM

## 2021-09-01 DIAGNOSIS — Z79.899 ON LONG TERM DRUG THERAPY: Chronic | ICD-10-CM

## 2021-09-01 DIAGNOSIS — S00.83XA TRAUMATIC HEMATOMA OF FOREHEAD, INITIAL ENCOUNTER: ICD-10-CM
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
## 2021-09-01 PROCEDURE — 99282 EMERGENCY DEPT VISIT SF MDM: CPT | Mod: ,,, | Performed by: EMERGENCY MEDICINE

## 2021-09-01 PROCEDURE — 99282 PR EMERGENCY DEPT VISIT,LEVEL II: ICD-10-PCS | Mod: ,,, | Performed by: EMERGENCY MEDICINE

## 2021-09-01 PROCEDURE — 99283 EMERGENCY DEPT VISIT LOW MDM: CPT

## 2021-09-21 ENCOUNTER — PROCEDURE VISIT (OUTPATIENT)
Dept: NEUROLOGY | Facility: HOSPITAL | Age: 66
End: 2021-09-21
Attending: NURSE PRACTITIONER
Payer: MEDICARE

## 2021-09-21 DIAGNOSIS — R56.9 CONVULSIONS, UNSPECIFIED CONVULSION TYPE: ICD-10-CM

## 2021-09-21 PROCEDURE — 95813 EEG EXTND MNTR 61-119 MIN: CPT | Mod: 26,,, | Performed by: PSYCHIATRY & NEUROLOGY

## 2021-09-21 PROCEDURE — 95813 PR EEG, EXTENDED, 61-119 MINS: ICD-10-PCS | Mod: 26,,, | Performed by: PSYCHIATRY & NEUROLOGY

## 2021-09-21 PROCEDURE — 95813 EEG EXTND MNTR 61-119 MIN: CPT

## 2021-09-25 ENCOUNTER — HOSPITAL ENCOUNTER (INPATIENT)
Facility: HOSPITAL | Age: 66
LOS: 1 days | Discharge: NURSING, REHAB OR LONG-TERM CARE | DRG: 381 | End: 2021-09-27
Attending: FAMILY MEDICINE | Admitting: FAMILY MEDICINE
Payer: MEDICARE

## 2021-09-25 DIAGNOSIS — F03.918 DEMENTIA WITH BEHAVIORAL DISTURBANCE: ICD-10-CM
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
## 2021-09-25 DIAGNOSIS — N39.0 UTI (URINARY TRACT INFECTION): ICD-10-CM

## 2021-09-25 DIAGNOSIS — N30.00 ACUTE CYSTITIS WITHOUT HEMATURIA: ICD-10-CM

## 2021-09-25 DIAGNOSIS — K92.0 HEMATEMESIS WITH NAUSEA: ICD-10-CM

## 2021-09-25 DIAGNOSIS — K92.2 GASTROINTESTINAL HEMORRHAGE: ICD-10-CM

## 2021-09-25 DIAGNOSIS — F03.91 DEMENTIA WITH BEHAVIORAL DISTURBANCE, UNSPECIFIED DEMENTIA TYPE: ICD-10-CM

## 2021-09-25 DIAGNOSIS — R56.9 SEIZURES: ICD-10-CM

## 2021-09-25 DIAGNOSIS — R07.9 CHEST PAIN: ICD-10-CM

## 2021-09-25 DIAGNOSIS — Z79.899 ON LONG TERM DRUG THERAPY: Chronic | ICD-10-CM
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
## 2021-09-25 DIAGNOSIS — K21.00 GASTROESOPHAGEAL REFLUX DISEASE WITH ESOPHAGITIS WITHOUT HEMORRHAGE: ICD-10-CM

## 2021-09-25 DIAGNOSIS — R56.9 SEIZURE: ICD-10-CM
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
## 2021-09-25 DIAGNOSIS — K92.2 GASTROINTESTINAL HEMORRHAGE, UNSPECIFIED GASTROINTESTINAL HEMORRHAGE TYPE: Primary | ICD-10-CM

## 2021-09-25 DIAGNOSIS — K21.9 GERD (GASTROESOPHAGEAL REFLUX DISEASE): ICD-10-CM

## 2021-09-25 DIAGNOSIS — K92.0 GASTROINTESTINAL HEMORRHAGE WITH HEMATEMESIS: ICD-10-CM

## 2021-09-25 DIAGNOSIS — K22.10 ULCER OF ESOPHAGUS WITHOUT BLEEDING: ICD-10-CM

## 2021-09-25 LAB
ALBUMIN SERPL BCP-MCNC: 3.2 G/DL (ref 3.5–5)
ALBUMIN/GLOB SERPL: 0.9 {RATIO}
ALP SERPL-CCNC: 61 U/L (ref 55–142)
ALT SERPL W P-5'-P-CCNC: 19 U/L (ref 13–56)
ANION GAP SERPL CALCULATED.3IONS-SCNC: 11 MMOL/L (ref 7–16)
APTT PPP: 32.4 SECONDS (ref 25.2–37.3)
AST SERPL W P-5'-P-CCNC: 16 U/L (ref 15–37)
BACTERIA #/AREA URNS HPF: ABNORMAL /HPF
BASOPHILS # BLD AUTO: 0.02 K/UL (ref 0–0.2)
BASOPHILS NFR BLD AUTO: 0.2 % (ref 0–1)
BILIRUB SERPL-MCNC: 0.3 MG/DL (ref 0–1.2)
BILIRUB UR QL STRIP: NEGATIVE
BUN SERPL-MCNC: 11 MG/DL (ref 7–18)
BUN/CREAT SERPL: 14 (ref 6–20)
CALCIUM SERPL-MCNC: 8.5 MG/DL (ref 8.5–10.1)
CHLORIDE SERPL-SCNC: 106 MMOL/L (ref 98–107)
CLARITY UR: CLEAR
CO2 SERPL-SCNC: 28 MMOL/L (ref 21–32)
COLOR UR: YELLOW
CREAT SERPL-MCNC: 0.77 MG/DL (ref 0.55–1.02)
DIFFERENTIAL METHOD BLD: ABNORMAL
EOSINOPHIL # BLD AUTO: 0.06 K/UL (ref 0–0.5)
EOSINOPHIL NFR BLD AUTO: 0.6 % (ref 1–4)
ERYTHROCYTE [DISTWIDTH] IN BLOOD BY AUTOMATED COUNT: 12.9 % (ref 11.5–14.5)
FLUAV AG UPPER RESP QL IA.RAPID: NEGATIVE
FLUBV AG UPPER RESP QL IA.RAPID: NEGATIVE
GLOBULIN SER-MCNC: 3.4 G/DL (ref 2–4)
GLUCOSE SERPL-MCNC: 121 MG/DL (ref 74–106)
GLUCOSE UR STRIP-MCNC: NEGATIVE MG/DL
HCT VFR BLD AUTO: 37.7 % (ref 38–47)
HGB BLD-MCNC: 12.9 G/DL (ref 12–16)
IMM GRANULOCYTES # BLD AUTO: 0.02 K/UL (ref 0–0.04)
IMM GRANULOCYTES NFR BLD: 0.2 % (ref 0–0.4)
INR BLD: 0.83 (ref 0.9–1.1)
KETONES UR STRIP-SCNC: 15 MG/DL
LEUKOCYTE ESTERASE UR QL STRIP: ABNORMAL
LYMPHOCYTES # BLD AUTO: 1.09 K/UL (ref 1–4.8)
LYMPHOCYTES NFR BLD AUTO: 10.6 % (ref 27–41)
MCH RBC QN AUTO: 32.7 PG (ref 27–31)
MCHC RBC AUTO-ENTMCNC: 34.2 G/DL (ref 32–36)
MCV RBC AUTO: 95.4 FL (ref 80–96)
MONOCYTES # BLD AUTO: 1.03 K/UL (ref 0–0.8)
MONOCYTES NFR BLD AUTO: 10 % (ref 2–6)
MPC BLD CALC-MCNC: 10.9 FL (ref 9.4–12.4)
MUCOUS THREADS #/AREA URNS HPF: ABNORMAL /HPF
NEUTROPHILS # BLD AUTO: 8.03 K/UL (ref 1.8–7.7)
NEUTROPHILS NFR BLD AUTO: 78.4 % (ref 53–65)
NITRITE UR QL STRIP: NEGATIVE
NRBC # BLD AUTO: 0 X10E3/UL
NRBC, AUTO (.00): 0 %
OCCULT BLOOD: POSITIVE
PH UR STRIP: 6 PH UNITS
PLATELET # BLD AUTO: 183 K/UL (ref 150–400)
POTASSIUM SERPL-SCNC: 4.1 MMOL/L (ref 3.5–5.1)
PROT SERPL-MCNC: 6.6 G/DL (ref 6.4–8.2)
PROT UR QL STRIP: 30
PROTHROMBIN TIME: 11.5 SECONDS (ref 11.7–14.7)
RBC # BLD AUTO: 3.95 M/UL (ref 4.2–5.4)
RBC # UR STRIP: NEGATIVE /UL
RBC #/AREA URNS HPF: ABNORMAL /HPF
SARS-COV+SARS-COV-2 AG RESP QL IA.RAPID: NEGATIVE
SODIUM SERPL-SCNC: 141 MMOL/L (ref 136–145)
SP GR UR STRIP: 1.02
SQUAMOUS #/AREA URNS LPF: ABNORMAL /LPF
TRICHOMONAS #/AREA URNS HPF: ABNORMAL /HPF
UROBILINOGEN UR STRIP-ACNC: 1 MG/DL
VALPROATE SERPL-MCNC: 35 ΜG/ML (ref 50–100)
WBC # BLD AUTO: 10.25 K/UL (ref 4.5–11)
WBC #/AREA URNS HPF: ABNORMAL /HPF
YEAST #/AREA URNS HPF: ABNORMAL /HPF

## 2021-09-25 PROCEDURE — 96376 TX/PRO/DX INJ SAME DRUG ADON: CPT | Performed by: NURSE PRACTITIONER

## 2021-09-25 PROCEDURE — 87086 URINE CULTURE/COLONY COUNT: CPT | Performed by: FAMILY MEDICINE

## 2021-09-25 PROCEDURE — 87428 SARSCOV & INF VIR A&B AG IA: CPT | Performed by: EMERGENCY MEDICINE

## 2021-09-25 PROCEDURE — G0378 HOSPITAL OBSERVATION PER HR: HCPCS

## 2021-09-25 PROCEDURE — 85610 PROTHROMBIN TIME: CPT | Performed by: FAMILY MEDICINE

## 2021-09-25 PROCEDURE — 80053 COMPREHEN METABOLIC PANEL: CPT | Performed by: FAMILY MEDICINE

## 2021-09-25 PROCEDURE — 85025 COMPLETE CBC W/AUTO DIFF WBC: CPT | Performed by: FAMILY MEDICINE

## 2021-09-25 PROCEDURE — C9113 INJ PANTOPRAZOLE SODIUM, VIA: HCPCS | Performed by: NURSE PRACTITIONER

## 2021-09-25 PROCEDURE — 63600175 PHARM REV CODE 636 W HCPCS: Performed by: NURSE PRACTITIONER

## 2021-09-25 PROCEDURE — 99285 EMERGENCY DEPT VISIT HI MDM: CPT | Mod: 25 | Performed by: FAMILY MEDICINE

## 2021-09-25 PROCEDURE — C9113 INJ PANTOPRAZOLE SODIUM, VIA: HCPCS | Performed by: FAMILY MEDICINE

## 2021-09-25 PROCEDURE — 80164 ASSAY DIPROPYLACETIC ACD TOT: CPT | Performed by: FAMILY MEDICINE

## 2021-09-25 PROCEDURE — 81001 URINALYSIS AUTO W/SCOPE: CPT | Performed by: FAMILY MEDICINE

## 2021-09-25 PROCEDURE — 63600175 PHARM REV CODE 636 W HCPCS: Performed by: FAMILY MEDICINE

## 2021-09-25 PROCEDURE — 87077 CULTURE AEROBIC IDENTIFY: CPT | Performed by: FAMILY MEDICINE

## 2021-09-25 PROCEDURE — 25000003 PHARM REV CODE 250: Performed by: FAMILY MEDICINE

## 2021-09-25 PROCEDURE — 25000003 PHARM REV CODE 250: Performed by: NURSE PRACTITIONER

## 2021-09-25 PROCEDURE — 36415 COLL VENOUS BLD VENIPUNCTURE: CPT | Performed by: FAMILY MEDICINE

## 2021-09-25 PROCEDURE — 82272 OCCULT BLD FECES 1-3 TESTS: CPT

## 2021-09-25 PROCEDURE — 85730 THROMBOPLASTIN TIME PARTIAL: CPT | Performed by: FAMILY MEDICINE

## 2021-09-25 PROCEDURE — 99283 EMERGENCY DEPT VISIT LOW MDM: CPT | Mod: ,,, | Performed by: FAMILY MEDICINE

## 2021-09-25 PROCEDURE — 27000221 HC OXYGEN, UP TO 24 HOURS

## 2021-09-25 PROCEDURE — 99283 PR EMERGENCY DEPT VISIT,LEVEL III: ICD-10-PCS | Mod: ,,, | Performed by: FAMILY MEDICINE

## 2021-09-25 PROCEDURE — 96372 THER/PROPH/DIAG INJ SC/IM: CPT | Mod: 59

## 2021-09-25 PROCEDURE — 96374 THER/PROPH/DIAG INJ IV PUSH: CPT | Performed by: FAMILY MEDICINE

## 2021-09-25 PROCEDURE — 81003 URINALYSIS AUTO W/O SCOPE: CPT | Performed by: FAMILY MEDICINE

## 2021-09-25 PROCEDURE — 99220 PR INITIAL OBSERVATION CARE,LEVL III: ICD-10-PCS | Mod: ,,, | Performed by: FAMILY MEDICINE

## 2021-09-25 PROCEDURE — 99220 PR INITIAL OBSERVATION CARE,LEVL III: CPT | Mod: ,,, | Performed by: FAMILY MEDICINE

## 2021-09-25 PROCEDURE — 96376 TX/PRO/DX INJ SAME DRUG ADON: CPT

## 2021-09-25 PROCEDURE — 94761 N-INVAS EAR/PLS OXIMETRY MLT: CPT

## 2021-09-25 RX ORDER — GLUCAGON 1 MG
1 KIT INJECTION
Status: DISCONTINUED | OUTPATIENT
Start: 2021-09-25 | End: 2021-09-27 | Stop reason: HOSPADM

## 2021-09-25 RX ORDER — NALOXONE HCL 0.4 MG/ML
0.02 VIAL (ML) INJECTION
Status: DISCONTINUED | OUTPATIENT
Start: 2021-09-25 | End: 2021-09-27 | Stop reason: HOSPADM

## 2021-09-25 RX ORDER — DIAZEPAM 10 MG/2ML
2 INJECTION INTRAMUSCULAR
Status: DISCONTINUED | OUTPATIENT
Start: 2021-09-25 | End: 2021-09-25

## 2021-09-25 RX ORDER — PANTOPRAZOLE SODIUM 40 MG/10ML
80 INJECTION, POWDER, LYOPHILIZED, FOR SOLUTION INTRAVENOUS
Status: COMPLETED | OUTPATIENT
Start: 2021-09-25 | End: 2021-09-25

## 2021-09-25 RX ORDER — DIVALPROEX SODIUM 250 MG/1
250 TABLET, DELAYED RELEASE ORAL DAILY
Status: DISCONTINUED | OUTPATIENT
Start: 2021-09-25 | End: 2021-09-25

## 2021-09-25 RX ORDER — ACETAMINOPHEN 325 MG/1
650 TABLET ORAL EVERY 4 HOURS PRN
Status: DISCONTINUED | OUTPATIENT
Start: 2021-09-25 | End: 2021-09-27 | Stop reason: HOSPADM

## 2021-09-25 RX ORDER — DIVALPROEX SODIUM 500 MG/1
500 TABLET, FILM COATED, EXTENDED RELEASE ORAL NIGHTLY
Status: DISCONTINUED | OUTPATIENT
Start: 2021-09-25 | End: 2021-09-25

## 2021-09-25 RX ORDER — DIVALPROEX SODIUM 250 MG/1
500 TABLET, DELAYED RELEASE ORAL EVERY 12 HOURS
Status: DISCONTINUED | OUTPATIENT
Start: 2021-09-25 | End: 2021-09-27 | Stop reason: HOSPADM

## 2021-09-25 RX ORDER — SODIUM CHLORIDE 9 MG/ML
INJECTION, SOLUTION INTRAVENOUS CONTINUOUS
Status: DISCONTINUED | OUTPATIENT
Start: 2021-09-25 | End: 2021-09-27 | Stop reason: HOSPADM

## 2021-09-25 RX ORDER — SODIUM CHLORIDE 0.9 % (FLUSH) 0.9 %
10 SYRINGE (ML) INJECTION EVERY 12 HOURS PRN
Status: DISCONTINUED | OUTPATIENT
Start: 2021-09-25 | End: 2021-09-27 | Stop reason: HOSPADM

## 2021-09-25 RX ORDER — DIVALPROEX SODIUM 250 MG/1
500 TABLET, DELAYED RELEASE ORAL EVERY 12 HOURS
Status: DISCONTINUED | OUTPATIENT
Start: 2021-09-25 | End: 2021-09-25

## 2021-09-25 RX ORDER — ONDANSETRON 2 MG/ML
4 INJECTION INTRAMUSCULAR; INTRAVENOUS EVERY 8 HOURS PRN
Status: DISCONTINUED | OUTPATIENT
Start: 2021-09-25 | End: 2021-09-27 | Stop reason: HOSPADM

## 2021-09-25 RX ORDER — DIVALPROEX SODIUM 500 MG/1
500 TABLET, FILM COATED, EXTENDED RELEASE ORAL NIGHTLY
Status: ON HOLD | COMMUNITY
End: 2021-09-27 | Stop reason: HOSPADM

## 2021-09-25 RX ADMIN — PANTOPRAZOLE SODIUM 8 MG/HR: 40 INJECTION, POWDER, FOR SOLUTION INTRAVENOUS at 08:09

## 2021-09-25 RX ADMIN — LIDOCAINE HYDROCHLORIDE 1 G: 10 INJECTION, SOLUTION INFILTRATION; PERINEURAL at 06:09

## 2021-09-25 RX ADMIN — SODIUM CHLORIDE: 9 INJECTION, SOLUTION INTRAVENOUS at 10:09

## 2021-09-25 RX ADMIN — PANTOPRAZOLE SODIUM 80 MG: 40 INJECTION, POWDER, FOR SOLUTION INTRAVENOUS at 03:09

## 2021-09-25 RX ADMIN — PANTOPRAZOLE SODIUM 8 MG/HR: 40 INJECTION, POWDER, FOR SOLUTION INTRAVENOUS at 03:09

## 2021-09-25 RX ADMIN — PANTOPRAZOLE SODIUM 8 MG/HR: 40 INJECTION, POWDER, FOR SOLUTION INTRAVENOUS at 10:09

## 2021-09-25 RX ADMIN — DIVALPROEX SODIUM 500 MG: 250 TABLET, DELAYED RELEASE ORAL at 08:09

## 2021-09-26 ENCOUNTER — ANESTHESIA EVENT (OUTPATIENT)
Dept: GASTROENTEROLOGY | Facility: HOSPITAL | Age: 66
DRG: 381 | End: 2021-09-26
Payer: MEDICARE

## 2021-09-26 ENCOUNTER — ANESTHESIA (OUTPATIENT)
Dept: GASTROENTEROLOGY | Facility: HOSPITAL | Age: 66
DRG: 381 | End: 2021-09-26
Payer: MEDICARE

## 2021-09-26 PROBLEM — K21.00 GASTROESOPHAGEAL REFLUX DISEASE WITH ESOPHAGITIS WITHOUT HEMORRHAGE: Status: ACTIVE | Noted: 2021-09-26

## 2021-09-26 PROBLEM — K22.10 ULCER OF ESOPHAGUS WITHOUT BLEEDING: Status: ACTIVE | Noted: 2021-09-26

## 2021-09-26 PROBLEM — N39.0 UTI (URINARY TRACT INFECTION): Status: ACTIVE | Noted: 2021-09-26

## 2021-09-26 PROBLEM — K92.0 HEMATEMESIS WITH NAUSEA: Status: ACTIVE | Noted: 2021-09-26

## 2021-09-26 LAB
ANION GAP SERPL CALCULATED.3IONS-SCNC: 8 MMOL/L (ref 7–16)
BASOPHILS # BLD AUTO: 0.02 K/UL (ref 0–0.2)
BASOPHILS NFR BLD AUTO: 0.3 % (ref 0–1)
BUN SERPL-MCNC: 8 MG/DL (ref 7–18)
BUN/CREAT SERPL: 11 (ref 6–20)
CALCIUM SERPL-MCNC: 7.9 MG/DL (ref 8.5–10.1)
CHLORIDE SERPL-SCNC: 110 MMOL/L (ref 98–107)
CO2 SERPL-SCNC: 27 MMOL/L (ref 21–32)
CREAT SERPL-MCNC: 0.7 MG/DL (ref 0.55–1.02)
DIFFERENTIAL METHOD BLD: ABNORMAL
EOSINOPHIL # BLD AUTO: 0.13 K/UL (ref 0–0.5)
EOSINOPHIL NFR BLD AUTO: 1.8 % (ref 1–4)
ERYTHROCYTE [DISTWIDTH] IN BLOOD BY AUTOMATED COUNT: 13.4 % (ref 11.5–14.5)
GLUCOSE SERPL-MCNC: 78 MG/DL (ref 74–106)
HCT VFR BLD AUTO: 31.2 % (ref 38–47)
HGB BLD-MCNC: 10.6 G/DL (ref 12–16)
IMM GRANULOCYTES # BLD AUTO: 0.02 K/UL (ref 0–0.04)
IMM GRANULOCYTES NFR BLD: 0.3 % (ref 0–0.4)
LYMPHOCYTES # BLD AUTO: 1.94 K/UL (ref 1–4.8)
LYMPHOCYTES NFR BLD AUTO: 26.6 % (ref 27–41)
MAGNESIUM SERPL-MCNC: 1.9 MG/DL (ref 1.7–2.3)
MCH RBC QN AUTO: 32.9 PG (ref 27–31)
MCHC RBC AUTO-ENTMCNC: 34 G/DL (ref 32–36)
MCV RBC AUTO: 96.9 FL (ref 80–96)
MONOCYTES # BLD AUTO: 0.64 K/UL (ref 0–0.8)
MONOCYTES NFR BLD AUTO: 8.8 % (ref 2–6)
MPC BLD CALC-MCNC: 10.8 FL (ref 9.4–12.4)
NEUTROPHILS # BLD AUTO: 4.54 K/UL (ref 1.8–7.7)
NEUTROPHILS NFR BLD AUTO: 62.2 % (ref 53–65)
NRBC # BLD AUTO: 0 X10E3/UL
NRBC, AUTO (.00): 0 %
PLATELET # BLD AUTO: 163 K/UL (ref 150–400)
POTASSIUM SERPL-SCNC: 3.3 MMOL/L (ref 3.5–5.1)
RBC # BLD AUTO: 3.22 M/UL (ref 4.2–5.4)
SODIUM SERPL-SCNC: 142 MMOL/L (ref 136–145)
WBC # BLD AUTO: 7.29 K/UL (ref 4.5–11)

## 2021-09-26 PROCEDURE — 83735 ASSAY OF MAGNESIUM: CPT | Performed by: NURSE PRACTITIONER

## 2021-09-26 PROCEDURE — 63600175 PHARM REV CODE 636 W HCPCS: Performed by: NURSE ANESTHETIST, CERTIFIED REGISTERED

## 2021-09-26 PROCEDURE — 63600175 PHARM REV CODE 636 W HCPCS: Performed by: NURSE PRACTITIONER

## 2021-09-26 PROCEDURE — 25000003 PHARM REV CODE 250: Performed by: INTERNAL MEDICINE

## 2021-09-26 PROCEDURE — 36415 COLL VENOUS BLD VENIPUNCTURE: CPT | Performed by: NURSE PRACTITIONER

## 2021-09-26 PROCEDURE — 94761 N-INVAS EAR/PLS OXIMETRY MLT: CPT

## 2021-09-26 PROCEDURE — 88312 SPECIAL STAINS GROUP 1: CPT | Mod: SUR | Performed by: INTERNAL MEDICINE

## 2021-09-26 PROCEDURE — 88305 TISSUE EXAM BY PATHOLOGIST: CPT | Mod: 26,,, | Performed by: PATHOLOGY

## 2021-09-26 PROCEDURE — 99232 SBSQ HOSP IP/OBS MODERATE 35: CPT | Mod: ,,, | Performed by: FAMILY MEDICINE

## 2021-09-26 PROCEDURE — 80048 BASIC METABOLIC PNL TOTAL CA: CPT | Performed by: NURSE PRACTITIONER

## 2021-09-26 PROCEDURE — D9220A PRA ANESTHESIA: ICD-10-PCS | Mod: CRNA,,, | Performed by: NURSE ANESTHETIST, CERTIFIED REGISTERED

## 2021-09-26 PROCEDURE — D9220A PRA ANESTHESIA: Mod: CRNA,,, | Performed by: NURSE ANESTHETIST, CERTIFIED REGISTERED

## 2021-09-26 PROCEDURE — 88312 SPECIAL STAINS GROUP 1: CPT | Mod: 26,,, | Performed by: PATHOLOGY

## 2021-09-26 PROCEDURE — D9220A PRA ANESTHESIA: Mod: ANES,ICN,, | Performed by: ANESTHESIOLOGY

## 2021-09-26 PROCEDURE — 25000003 PHARM REV CODE 250: Performed by: NURSE PRACTITIONER

## 2021-09-26 PROCEDURE — C9113 INJ PANTOPRAZOLE SODIUM, VIA: HCPCS | Performed by: NURSE PRACTITIONER

## 2021-09-26 PROCEDURE — 43239 EGD BIOPSY SINGLE/MULTIPLE: CPT

## 2021-09-26 PROCEDURE — G0378 HOSPITAL OBSERVATION PER HR: HCPCS

## 2021-09-26 PROCEDURE — 88305 SURGICAL PATHOLOGY: ICD-10-PCS | Mod: 26,,, | Performed by: PATHOLOGY

## 2021-09-26 PROCEDURE — 88312 SURGICAL PATHOLOGY: ICD-10-PCS | Mod: 26,,, | Performed by: PATHOLOGY

## 2021-09-26 PROCEDURE — 85025 COMPLETE CBC W/AUTO DIFF WBC: CPT | Performed by: NURSE PRACTITIONER

## 2021-09-26 PROCEDURE — 27000221 HC OXYGEN, UP TO 24 HOURS

## 2021-09-26 PROCEDURE — 96376 TX/PRO/DX INJ SAME DRUG ADON: CPT

## 2021-09-26 PROCEDURE — D9220A PRA ANESTHESIA: ICD-10-PCS | Mod: ANES,ICN,, | Performed by: ANESTHESIOLOGY

## 2021-09-26 PROCEDURE — 11000001 HC ACUTE MED/SURG PRIVATE ROOM

## 2021-09-26 PROCEDURE — 99232 PR SUBSEQUENT HOSPITAL CARE,LEVL II: ICD-10-PCS | Mod: ,,, | Performed by: FAMILY MEDICINE

## 2021-09-26 RX ORDER — MIDAZOLAM HYDROCHLORIDE 1 MG/ML
INJECTION INTRAMUSCULAR; INTRAVENOUS
Status: DISCONTINUED | OUTPATIENT
Start: 2021-09-26 | End: 2021-09-26

## 2021-09-26 RX ORDER — POTASSIUM CHLORIDE 7.45 MG/ML
40 INJECTION INTRAVENOUS ONCE
Status: COMPLETED | OUTPATIENT
Start: 2021-09-26 | End: 2021-09-26

## 2021-09-26 RX ORDER — PANTOPRAZOLE SODIUM 40 MG/1
40 TABLET, DELAYED RELEASE ORAL DAILY
Status: DISCONTINUED | OUTPATIENT
Start: 2021-09-26 | End: 2021-09-27 | Stop reason: HOSPADM

## 2021-09-26 RX ORDER — SODIUM CHLORIDE 0.9 % (FLUSH) 0.9 %
10 SYRINGE (ML) INJECTION
Status: CANCELLED | OUTPATIENT
Start: 2021-09-26

## 2021-09-26 RX ORDER — PROPOFOL 10 MG/ML
VIAL (ML) INTRAVENOUS
Status: DISCONTINUED | OUTPATIENT
Start: 2021-09-26 | End: 2021-09-26

## 2021-09-26 RX ADMIN — POTASSIUM CHLORIDE 40 MEQ: 7.46 INJECTION, SOLUTION INTRAVENOUS at 10:09

## 2021-09-26 RX ADMIN — DIVALPROEX SODIUM 500 MG: 250 TABLET, DELAYED RELEASE ORAL at 09:09

## 2021-09-26 RX ADMIN — MIDAZOLAM HYDROCHLORIDE 1 MG: 1 INJECTION, SOLUTION INTRAMUSCULAR; INTRAVENOUS at 08:09

## 2021-09-26 RX ADMIN — CEFTRIAXONE SODIUM 1 G: 1 INJECTION, POWDER, FOR SOLUTION INTRAMUSCULAR; INTRAVENOUS at 12:09

## 2021-09-26 RX ADMIN — PROPOFOL 50 MG: 10 INJECTION, EMULSION INTRAVENOUS at 08:09

## 2021-09-26 RX ADMIN — PANTOPRAZOLE SODIUM 40 MG: 40 TABLET, DELAYED RELEASE ORAL at 10:09

## 2021-09-26 RX ADMIN — SODIUM CHLORIDE: 9 INJECTION, SOLUTION INTRAVENOUS at 09:09

## 2021-09-26 RX ADMIN — DIVALPROEX SODIUM 500 MG: 250 TABLET, DELAYED RELEASE ORAL at 10:09

## 2021-09-26 RX ADMIN — SODIUM CHLORIDE: 9 INJECTION, SOLUTION INTRAVENOUS at 01:09

## 2021-09-26 RX ADMIN — PANTOPRAZOLE SODIUM 8 MG/HR: 40 INJECTION, POWDER, FOR SOLUTION INTRAVENOUS at 01:09

## 2021-09-27 VITALS
DIASTOLIC BLOOD PRESSURE: 40 MMHG | OXYGEN SATURATION: 95 % | HEIGHT: 65 IN | RESPIRATION RATE: 17 BRPM | WEIGHT: 125 LBS | BODY MASS INDEX: 20.83 KG/M2 | TEMPERATURE: 98 F | HEART RATE: 76 BPM | SYSTOLIC BLOOD PRESSURE: 84 MMHG

## 2021-09-27 PROBLEM — K92.2 GASTROINTESTINAL HEMORRHAGE: Status: RESOLVED | Noted: 2021-09-25 | Resolved: 2021-09-27

## 2021-09-27 PROBLEM — K92.0 HEMATEMESIS WITH NAUSEA: Status: RESOLVED | Noted: 2021-09-26 | Resolved: 2021-09-27

## 2021-09-27 PROBLEM — K22.10 ULCER OF ESOPHAGUS WITHOUT BLEEDING: Status: RESOLVED | Noted: 2021-09-26 | Resolved: 2021-09-27
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
## 2021-09-27 LAB
ANION GAP SERPL CALCULATED.3IONS-SCNC: 16 MMOL/L (ref 7–16)
BASOPHILS # BLD AUTO: 0.02 K/UL (ref 0–0.2)
BASOPHILS NFR BLD AUTO: 0.3 % (ref 0–1)
BUN SERPL-MCNC: 14 MG/DL (ref 7–18)
BUN/CREAT SERPL: 24 (ref 6–20)
CALCIUM SERPL-MCNC: 7.8 MG/DL (ref 8.5–10.1)
CHLORIDE SERPL-SCNC: 109 MMOL/L (ref 98–107)
CO2 SERPL-SCNC: 20 MMOL/L (ref 21–32)
CREAT SERPL-MCNC: 0.58 MG/DL (ref 0.55–1.02)
DIFFERENTIAL METHOD BLD: ABNORMAL
EOSINOPHIL # BLD AUTO: 0.08 K/UL (ref 0–0.5)
EOSINOPHIL NFR BLD AUTO: 1.3 % (ref 1–4)
ERYTHROCYTE [DISTWIDTH] IN BLOOD BY AUTOMATED COUNT: 13 % (ref 11.5–14.5)
GLUCOSE SERPL-MCNC: 41 MG/DL (ref 74–106)
GLUCOSE SERPL-MCNC: 98 MG/DL (ref 70–105)
HCT VFR BLD AUTO: 31.5 % (ref 38–47)
HGB BLD-MCNC: 10.1 G/DL (ref 12–16)
IMM GRANULOCYTES # BLD AUTO: 0.02 K/UL (ref 0–0.04)
IMM GRANULOCYTES NFR BLD: 0.3 % (ref 0–0.4)
LYMPHOCYTES # BLD AUTO: 1.58 K/UL (ref 1–4.8)
LYMPHOCYTES NFR BLD AUTO: 25.9 % (ref 27–41)
MAGNESIUM SERPL-MCNC: 2 MG/DL (ref 1.7–2.3)
MCH RBC QN AUTO: 32.5 PG (ref 27–31)
MCHC RBC AUTO-ENTMCNC: 32.1 G/DL (ref 32–36)
MCV RBC AUTO: 101.3 FL (ref 80–96)
MONOCYTES # BLD AUTO: 0.35 K/UL (ref 0–0.8)
MONOCYTES NFR BLD AUTO: 5.7 % (ref 2–6)
MPC BLD CALC-MCNC: 10.6 FL (ref 9.4–12.4)
NEUTROPHILS # BLD AUTO: 4.04 K/UL (ref 1.8–7.7)
NEUTROPHILS NFR BLD AUTO: 66.5 % (ref 53–65)
NRBC # BLD AUTO: 0 X10E3/UL
NRBC, AUTO (.00): 0 %
PLATELET # BLD AUTO: 161 K/UL (ref 150–400)
POTASSIUM SERPL-SCNC: 4.3 MMOL/L (ref 3.5–5.1)
RBC # BLD AUTO: 3.11 M/UL (ref 4.2–5.4)
SODIUM SERPL-SCNC: 141 MMOL/L (ref 136–145)
UA COMPLETE W REFLEX CULTURE PNL UR: ABNORMAL
WBC # BLD AUTO: 6.09 K/UL (ref 4.5–11)

## 2021-09-27 PROCEDURE — 83735 ASSAY OF MAGNESIUM: CPT | Performed by: NURSE PRACTITIONER

## 2021-09-27 PROCEDURE — 85025 COMPLETE CBC W/AUTO DIFF WBC: CPT | Performed by: NURSE PRACTITIONER

## 2021-09-27 PROCEDURE — 80048 BASIC METABOLIC PNL TOTAL CA: CPT | Performed by: NURSE PRACTITIONER

## 2021-09-27 PROCEDURE — 36415 COLL VENOUS BLD VENIPUNCTURE: CPT | Performed by: NURSE PRACTITIONER

## 2021-09-27 PROCEDURE — 25000003 PHARM REV CODE 250: Performed by: NURSE PRACTITIONER

## 2021-09-27 PROCEDURE — 94761 N-INVAS EAR/PLS OXIMETRY MLT: CPT

## 2021-09-27 PROCEDURE — 27000221 HC OXYGEN, UP TO 24 HOURS

## 2021-09-27 PROCEDURE — 25000003 PHARM REV CODE 250: Performed by: INTERNAL MEDICINE

## 2021-09-27 PROCEDURE — 99239 HOSP IP/OBS DSCHRG MGMT >30: CPT | Mod: ,,, | Performed by: HOSPITALIST

## 2021-09-27 PROCEDURE — 82962 GLUCOSE BLOOD TEST: CPT

## 2021-09-27 PROCEDURE — 99239 PR HOSPITAL DISCHARGE DAY,>30 MIN: ICD-10-PCS | Mod: ,,, | Performed by: HOSPITALIST

## 2021-09-27 RX ORDER — CEPHALEXIN 500 MG/1
500 CAPSULE ORAL EVERY 12 HOURS
Qty: 10 CAPSULE | Refills: 0
Start: 2021-09-27 | End: 2021-10-02

## 2021-09-27 RX ORDER — DIVALPROEX SODIUM 500 MG/1
500 TABLET, DELAYED RELEASE ORAL EVERY 12 HOURS
Qty: 60 TABLET | Refills: 11
Start: 2021-09-27 | End: 2022-01-26 | Stop reason: ALTCHOICE

## 2021-09-27 RX ORDER — PANTOPRAZOLE SODIUM 40 MG/1
40 TABLET, DELAYED RELEASE ORAL DAILY
Qty: 30 TABLET | Refills: 11 | Status: ON HOLD
Start: 2021-09-28 | End: 2022-12-19 | Stop reason: HOSPADM

## 2021-09-27 RX ADMIN — PANTOPRAZOLE SODIUM 40 MG: 40 TABLET, DELAYED RELEASE ORAL at 07:09

## 2021-09-27 RX ADMIN — DIVALPROEX SODIUM 500 MG: 250 TABLET, DELAYED RELEASE ORAL at 07:09

## 2021-09-28 LAB
ESTROGEN SERPL-MCNC: NORMAL PG/ML
LAB AP GROSS DESCRIPTION: NORMAL
LAB AP LABORATORY NOTES: NORMAL
T3RU NFR SERPL: NORMAL %

## 2021-09-30 ENCOUNTER — TELEPHONE (OUTPATIENT)
Dept: GASTROENTEROLOGY | Facility: CLINIC | Age: 66
End: 2021-09-30

## 2021-10-25 ENCOUNTER — OFFICE VISIT (OUTPATIENT)
Dept: NEUROLOGY | Facility: CLINIC | Age: 66
End: 2021-10-25
Payer: MEDICARE

## 2021-10-25 VITALS
HEIGHT: 65 IN | WEIGHT: 122.88 LBS | SYSTOLIC BLOOD PRESSURE: 114 MMHG | HEART RATE: 93 BPM | BODY MASS INDEX: 20.47 KG/M2 | OXYGEN SATURATION: 98 % | DIASTOLIC BLOOD PRESSURE: 66 MMHG

## 2021-10-25 DIAGNOSIS — Z79.899 ON LONG TERM DRUG THERAPY: Chronic | ICD-10-CM

## 2021-10-25 DIAGNOSIS — R26.89 BALANCE PROBLEM: ICD-10-CM

## 2021-10-25 DIAGNOSIS — R56.9 CONVULSIONS, UNSPECIFIED CONVULSION TYPE: Primary | ICD-10-CM

## 2021-10-25 PROCEDURE — 99213 PR OFFICE/OUTPT VISIT, EST, LEVL III, 20-29 MIN: ICD-10-PCS | Mod: S$PBB,,, | Performed by: NURSE PRACTITIONER

## 2021-10-25 PROCEDURE — 99213 OFFICE O/P EST LOW 20 MIN: CPT | Mod: S$PBB,,, | Performed by: NURSE PRACTITIONER

## 2021-10-25 PROCEDURE — 99214 OFFICE O/P EST MOD 30 MIN: CPT | Mod: PBBFAC | Performed by: NURSE PRACTITIONER

## 2021-10-25 RX ORDER — ACETAMINOPHEN 500 MG
500 TABLET ORAL EVERY 6 HOURS PRN
COMMUNITY

## 2021-10-27 ENCOUNTER — TELEPHONE (OUTPATIENT)
Dept: NEUROLOGY | Facility: CLINIC | Age: 66
End: 2021-10-27
Payer: MEDICARE
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
## 2022-01-26 ENCOUNTER — OFFICE VISIT (OUTPATIENT)
Dept: NEUROLOGY | Facility: CLINIC | Age: 67
End: 2022-01-26
Payer: MEDICARE

## 2022-01-26 VITALS
HEIGHT: 65 IN | HEART RATE: 72 BPM | OXYGEN SATURATION: 97 % | SYSTOLIC BLOOD PRESSURE: 118 MMHG | WEIGHT: 116 LBS | DIASTOLIC BLOOD PRESSURE: 72 MMHG | BODY MASS INDEX: 19.33 KG/M2

## 2022-01-26 DIAGNOSIS — Z79.899 ON LONG TERM DRUG THERAPY: Chronic | ICD-10-CM

## 2022-01-26 DIAGNOSIS — R56.9 CONVULSIONS, UNSPECIFIED CONVULSION TYPE: Primary | Chronic | ICD-10-CM

## 2022-01-26 DIAGNOSIS — R26.89 BALANCE PROBLEM: ICD-10-CM

## 2022-01-26 PROCEDURE — 99214 PR OFFICE/OUTPT VISIT, EST, LEVL IV, 30-39 MIN: ICD-10-PCS | Mod: S$PBB,,, | Performed by: NURSE PRACTITIONER

## 2022-01-26 PROCEDURE — 99214 OFFICE O/P EST MOD 30 MIN: CPT | Mod: S$PBB,,, | Performed by: NURSE PRACTITIONER

## 2022-01-26 PROCEDURE — 99214 OFFICE O/P EST MOD 30 MIN: CPT | Mod: PBBFAC | Performed by: NURSE PRACTITIONER

## 2022-01-26 RX ORDER — LEVETIRACETAM 100 MG/ML
1000 SOLUTION ORAL 2 TIMES DAILY
COMMUNITY
Start: 2022-01-05 | End: 2022-08-19 | Stop reason: SDUPTHER

## 2022-01-26 RX ORDER — ESOMEPRAZOLE MAGNESIUM 40 MG/1
CAPSULE, DELAYED RELEASE ORAL
COMMUNITY
Start: 2022-01-12 | End: 2022-12-13

## 2022-01-26 NOTE — PATIENT INSTRUCTIONS
Patient Education       Preventing Falls   The Basics   Written by the doctors and editors at [facility]   Am I at risk of falling? -- Your risk of falling increases as you grow older. That's because getting older can make it harder to walk steadily and keep your balance. Also, the effects of falls are more serious in older people.  Overall, 3 to 4 out of every 10 people over the age of 65 fall each year. Up to 75 percent of people who fracture a hip never recover to the point they were before they had their fracture. If you have fallen in the past, you are at higher risk of falling again.  Several things can increase your risk of a fall, including:  · Illness  · A change in the medicines you take  · An unsafe or unfamiliar setting (for example, a room with rugs or furniture that might trip you, or an area you don't know well)  How can my doctor help me to avoid falling? -- Your doctor can talk to you about the following things:  · Past falls - It is important to tell your doctor about any times you have fallen or almost fallen. He or she can then suggest ways to prevent another fall.  · Your health conditions - Some health problems can put you at risk of falling. These include conditions that affect eyesight, hearing, muscle strength, or balance.  · The medicines you take - Certain medicines can increase the risk of falling. These include some medicines that are used for sleeping problems, anxiety, high blood pressure, or depression. Adding new medicines, or changing doses of some medicines, can also affect your risk of falling.  The more your doctor knows about your situation, the better he or she will be able to help you. For example, if you fell because you have a condition that causes pain, your doctor might suggest treatments to deal with the pain. Or if one of your medicines is making you dizzy and more likely to fall, your doctor might switch you to a different medicine.  Is there anything I can do on my  own? -- Yes. To help keep from falling, you can:  · Make your home safer - To avoid falling at home, get rid of things that might make you trip or slip. This might include furniture, electrical cords, clutter, and loose rugs (figure 1). Keep your home well-lit so that you can easily see where you are going. Avoid storing things in high places so you don't have to reach or climb.  · Wear sturdy shoes that fit well - Wearing shoes with high heels or slippery soles, or shoes that are too loose, can lead to falls. Walking around in bare feet, or only socks, can also increase your risk of falling.  · Take vitamin D pills - Taking vitamin D might lower the risk of falls in older people. This is because vitamin D helps make bones and muscles stronger. Your doctor can talk to you about whether you should take extra vitamin D, and how much.  · Stay active - Exercising on a regular basis can help lower your risk of falling. It might also help prevent you from getting hurt if you do fall. It is best to do a few different activities that help with both strength and balance. There are many kinds of exercise that can be safe for older people. These include walking, swimming, and Artie Chi (a Chinese martial art that involves slow, gentle movements).  · Use a cane, walker, and other safety devices - If your doctor recommends that you use a cane or walker, be sure that it's the right size and you know how to use it. There are other devices that might help you avoid falling, too. These include grab bars or a sturdy seat for the shower, non-slip bath mats, and hand rails or treads for the stairs (to prevent slipping).  If you worry that you could fall, there are also alarm buttons that let you call for help if you fall and can't get up.  What should I do if I fall? -- If you fall, see your doctor right away, even if you aren't hurt. Your doctor can try to figure out what caused you to fall, and how likely you are to fall again. He or  she will do an exam and talk to you about your health problems, medicines, and activities. Then he or she can suggest things you can do to avoid falling again.  Many older people have a hard time recovering after a fall. Doing things to prevent falling can help you to protect your health and independence.  All topics are updated as new evidence becomes available and our peer review process is complete.  This topic retrieved from [employer] on: Sep 21, 2021.  Topic 34072 Version 18.0  Release: 29.4.2 - C29.263  © 2021 UpToDate, Inc. and/or its affiliates. All rights reserved.  figure 1: How to avoid falling at home     This picture shows some of the things that can cause a fall in your home. Look around and remove any loose rugs, electrical cords, clutter, or furniture that could trip you.  Graphic 96578 Version 1.0    Consumer Information Use and Disclaimer   This information is not specific medical advice and does not replace information you receive from your health care provider. This is only a brief summary of general information. It does NOT include all information about conditions, illnesses, injuries, tests, procedures, treatments, therapies, discharge instructions or life-style choices that may apply to you. You must talk with your health care provider for complete information about your health and treatment options. This information should not be used to decide whether or not to accept your health care provider's advice, instructions or recommendations. Only your health care provider has the knowledge and training to provide advice that is right for you. The use of this information is governed by the [employer] End User License Agreement, available at https://www.[employer]/en/solutions/[employer]/about/mason.The use of [employer] content is governed by the [employer] Terms of Use. ©2021 UpToDate, Inc. All rights reserved.  Copyright   © 2021 UpToDate, Inc. and/or its affiliates. All rights reserved.  Patient  "Education       Seizures   The Basics   Written by the doctors and editors at [facility]   What are seizures? -- Seizures are waves of abnormal electrical activity in the brain. Seizures can make you pass out, or move or behave strangely. Most seizures last only a few seconds or minutes.  Epilepsy is a condition that causes people to have repeated seizures. But not everyone who has had a seizure has epilepsy. Problems such as low blood sugar or infection can also cause seizures. Other problems such as anxiety or fainting spells can cause events that look like seizures.  What are the symptoms of a seizure? -- There are different kinds of seizures. Each causes a different set of symptoms.  People who have "tonic clonic" or "grand mal" seizures often get stiff and then have jerking movements. People who have other types of seizures have less dramatic changes. For instance, some people have shaking movements in just 1 arm or in a part of their face. Other people suddenly stop responding and stare for a few seconds.  Should I see a doctor or nurse if I have a seizure? -- If you have never had a seizure before and you have one, you (or whoever is with you) should call for an ambulance (in the [place] and [place], dial [phone]). Having a seizure can be a sign that something is wrong with your brain.  How are seizures treated? -- The right treatment for seizures depends on what is causing them. If you have seizures because of an infection, you will probably need treatments to get rid of the infection. On the other hand, if you have repeated seizures because of epilepsy, you will probably need anti-seizure medicines, also called "anti-convulsants."  People sometimes need to try different medicines before they find a treatment that works well. Seizures can be hard to control. But if you work with your doctor, chances are good that you will find a treatment that works.  Do anti-seizure medicines cause side effects? -- Yes. Anti-seizure " "medicines can cause side effects. They can make you feel tired or clumsy, or cause other problems. If you are bothered by side effects, tell your doctor about it. They can work with you to find the medicine or dose that causes the fewest problems. Most of the side effects from these medicines are mild. But there are two side effects that are very serious but rare:  · Anti-seizure medicines can cause a rare but serious skin rash. Tell your doctor or nurse right away if you notice a new rash while taking an anti-seizure medicine.  · Anti-seizure medicines can increase the risk of becoming suicidal (wanting to kill yourself). Tell your doctor or nurse right away if you start to feel depressed or have thoughts of harming yourself.  What if anti-seizure medicines do not work for me? -- If you keep having seizures even after trying different medicines, you might have other options. Some people can have surgery to remove the small part of their brain that is causing seizures. Others get a device called a "vagus nerve stimulator" put in their chest to help control seizures.  A special diet, called the "ketogenic diet," might be helpful for some people. This diet involves eating foods that are high in fat but avoiding carbohydrates. If you are interested in trying this kind of diet, your doctor or nurse can talk to you about how to do this safely.   What can I do to keep myself safe? -- Until you have your seizures under control, do not drive. The laws that say when a person with seizures can drive are different depending on where the person lives. Ask your doctor if you can safely drive and about the laws where you live.  Also, if your seizures are not under control, make sure to take other safety steps. For example, do not swim without someone else nearby who could help you if you started having a seizure. And avoid activities that could result in you falling from a height.  How can I reduce my chances of having more " seizures? -- You can:  · Take your medicines exactly as directed - at the right times, and at the right doses.  · Tell your doctor about any side effects you have. That way you can work together to find the best medicine for you.  · Be careful not to let your prescription run out. (Stopping anti-seizure medicine suddenly can put you at risk of seizures.)  · While on anti-seizure medicines, check with your doctor before starting any new medicines. Anti-seizure medicines can interact with prescription and non-prescription medicines, and with herbal drugs. Mixing them can increase side effects or make them not work as well.  · Avoid alcohol. Alcohol can increase the risk of seizures, affect the way seizure medicines work, and increase side effects from anti-seizure medicines.  What should other people do if they see me having a seizure? -- Ask your doctor what your family members, friends, or coworkers should do if you have a seizure. Some people will have seizures from time to time, and they might not need to see a doctor every time. But if you have a seizure that lasts longer than 5 minutes or if you do not wake up after a seizure, someone should call for an ambulance (in the [place] and [place], dial 9-1-1).  Other people should not try to put anything in your mouth while you are having a seizure. But they should make sure you do not bang against any hard surfaces.  What if I want to get pregnant? -- If you take anti-seizure medicines, speak to your doctor or nurse before you start trying to get pregnant. Some anti-seizure medicines can hurt an unborn baby. You might need to switch medicines before you get pregnant.  All topics are updated as new evidence becomes available and our peer review process is complete.  This topic retrieved from [employer] on: Sep 21, 2021.  Topic 33756 Version 17.0  Release: 29.4.2 - C29.263  © 2021 UpToDate, Inc. and/or its affiliates. All rights reserved.  Consumer Information Use and  Disclaimer   This information is not specific medical advice and does not replace information you receive from your health care provider. This is only a brief summary of general information. It does NOT include all information about conditions, illnesses, injuries, tests, procedures, treatments, therapies, discharge instructions or life-style choices that may apply to you. You must talk with your health care provider for complete information about your health and treatment options. This information should not be used to decide whether or not to accept your health care provider's advice, instructions or recommendations. Only your health care provider has the knowledge and training to provide advice that is right for you. The use of this information is governed by the [employer] End User License Agreement, available at https://www.[employer]/en/solutions/[employer]/about/mason.The use of [employer] content is governed by the [employer] Terms of Use. ©2021 UpToDate, Inc. All rights reserved.  Copyright   © 2021 UpToDate, Inc. and/or its affiliates. All rights reserved.  Patient Education       Seizures Discharge Instructions, Adult   About this topic   A seizure happens when your brain sends out abnormal signals through its electrical activity. It may cause shaking and fast movements that you cannot control. You will not be aware of this. A seizure most often lasts for a few seconds to minutes and then causes you to be very tired. You may seem to be asleep or confused during this time. In a little while, you should return to your normal self.       What care is needed at home?   · Ask your doctor what you need to do when you go home. Make sure you ask questions if you do not understand what the doctor says. This way you will know what you need to do.  · Be sure to take all your seizure drugs. Do not skip doses. Do not suddenly stop taking these drugs.  · Wear a medical alert ID.  · Keep your home safe so you do not get  hurt when your seizure happens. For example:  ? Keep doors inside your home unlocked or unblocked.  ? Carpet all floors to keep them soft in case you have a seizure and fall.  ? Be careful when taking a bath. If you have a seizure while in the tub, you might drown. Showers are safer.  ? Replace glass doors with safety glass.  ? Replace glass dishes and glassware with plastic.  · Wear a helmet or headgear to protect your head during riding, skiing, and other active sports.  · Stay away from doing things that can put you or someone else in danger if you have a seizure.  Tell your family, friends, and others about your health problem. If a seizure happens, tell your family and friends to:  · Clear the area so you are not hurt  · Place you on a flat surface  · Not try to hold you still  · Not put anything in your mouth  · Turn you onto your side if you are throwing up  · Stay with you until you are feeling better and know what happened  · Time the seizure  What follow-up care is needed?   Your doctor may ask you to make visits to the office to check on your progress. Be sure to keep these visits. Your doctor may order tests, such as scans or tests of your brain's electrical activity. Be sure to keep these visits and follow up to get test results. You may have a blood test to see if the level of the seizure drug in your blood is good.  What drugs may be needed?   The doctor will give you a drug or drugs for your seizures. There are many kinds of these drugs. The choice will depend on what type of seizure you are having and how well the drug works for you.  Will physical activity be limited?   · You should be able to work out, dance, and play sports such as tennis, golf, bowling, and others.  · Always have someone with you when swimming.  · Follow laws in your area about driving with a seizure problem.  · Talk to your doctor about the right activities for you.  · Ask your doctor if it is safe to drive.  What changes to  diet are needed?   · Do not drink beer, wine, and mixed drinks (alcohol).   · Do not take drugs and natural products that slow your actions.  What problems could happen?   · You could fall and get hurt while having a seizure.  · The drugs you are taking can have side effects. They can make you feel tired and even depressed.  What can be done to prevent this health problem?   · Get enough sleep. Not getting the right amount of sleep can make you more likely to have a seizure.  · Eat regular, well-balanced meals.  · Do not skip doses of your seizure drug. This could cause you to have a seizure.  · Lower your stress.  When do I need to call the doctor?   · Seizures happen more often  · Seizures are worse  · Seizure lasts more than 5 minutes  · Signs of low mood (depression), thoughts of killing yourself, nervousness, emotional ups and downs, thinking that is not normal, anxiety, or lack of interest in life  · Changes in the way you act that are not like you  · Feeling weak  · Change in balance  · Side effects from your drugs  · You are not feeling better in 2 to 3 days or you are feeling worse  Helpful tips   · Do not miss doses of your seizure drugs. If you forget a dose, take it as soon as possible. If it is within a few hours of your next dose, skip it and take your next dose.  · Do not run out of your seizure drugs. Get refills before you run out.  Teach Back: Helping You Understand   The Teach Back Method helps you understand the information we are giving you. . After you talk with the staff, tell them in your own words what you learned. This helps to make sure the staff has covered each thing clearly. It also helps to explain things that may have been confusing. Before going home, make sure you can do these:  · I can tell you about my condition.  · I can tell you what to do if I have a seizure.  · I can tell you what I need to do if my seizures happen more often or last more than 5 minutes.  Where can I learn  more?   American Academy of Family Physicians  https://familydoctor.org/condition/epilepsy/   Better Health Channel  https://www.betterhealth.walker.gov.au/health/ConditionsAndTreatments/epilepsy   National [place] of Neurological Disorders and Stroke  http://www.ninds.nih.gov/disorders/epilepsy/detail_epilepsy.htm   Last Reviewed Date   2020-04-09  Consumer Information Use and Disclaimer   This information is not specific medical advice and does not replace information you receive from your health care provider. This is only a brief summary of general information. It does NOT include all information about conditions, illnesses, injuries, tests, procedures, treatments, therapies, discharge instructions or life-style choices that may apply to you. You must talk with your health care provider for complete information about your health and treatment options. This information should not be used to decide whether or not to accept your health care providers advice, instructions or recommendations. Only your health care provider has the knowledge and training to provide advice that is right for you.  Copyright   Copyright © 2021 UpToDate, Inc. and its affiliates and/or licensors. All rights reserved.  Patient Education       Why Taking Your Medicine or Drug as Ordered Is Important   About this topic   Your doctor has ordered a medicine or drug to help you with your condition. You may need to be on this drug for a short time or for the rest of your life. In either case, it is important to take the drug the right way. When you do, you are more likely to feel better and not have to go to the hospital. You are also less likely to have complications from your illness.  General   You can have a big impact on your health just by taking your drugs the right way.  Keep a list of all the drugs you take. This includes prescription and over-the-counter (OTC) drugs, natural products, and vitamins. Update your list when your drugs  change. Keep this list in your wallet or purse and share it with each doctor who you see. Some drugs can have harmful interactions if you take them together. This is why it is so important for all of your doctors to know about all of the drugs you take. Also keep a list of drugs or other things you are allergic to and share that with each doctor you see.  Taking your drugs the right way is something that only you can control. People dont take their drugs correctly for many reasons. Here are some of them and tips that may help.  I cant afford the prescription.   Some people are not able to take their drugs as ordered because they cannot afford them. This is a common problem as some drugs can be very costly.  · Be honest with your doctor about how much you can pay. Ask how much the drug will cost and if there is a cheaper or generic drug that you can take instead. Your pharmacist may be able to help you learn more about this.  · Understand what your insurance will pay. Sometimes you need special approval for a drug. Maybe it will be cheaper if the pharmacy mails it to you. Sometimes it costs less if the doctor orders a 3-month supply of the drug at one time.  · Ask your doctor for samples of the drug. Sometimes your doctor may have free samples or coupons to give you.  · Learn about prescription assistance programs in your area. Many times, a state or community will have a program to help pay for drugs.  · Look for programs by drug companies that offer free or low-cost drugs to people who have problems paying for their drugs. Ask your doctor or pharmacist for the name of the company that makes the drug ordered. You can ask that company if they have ways to help you afford your drugs.  I cant remember to take my medicines.   Taking your drugs at the right times each day is important. So is taking them each time you are supposed to.  · Set an alarm on your phone or watch to help you remember. There are also apps  "for your phone that can text you to remind you to take your drugs.  · If your medicines can be stored in a pill container, use a pill container that has a section for different times of the day. Fill it each week. This makes it easier to see if you have taken your drugs for the day.  · Take your medicine each day at the same time you do another activity, like brush your teeth or eat breakfast. Be sure to check and see if your drugs need to be taken with food or on an empty stomach before you set your routine.  · Turn your pill bottle upside down after you take your drugs for the day. Before you go to bed at night, turn the pill bottles right side up again.  · Put a note where you will see it each day, like on the refrigerator, mirror, or the door.  Im worried about side effects.   Its true that a drug may cause side effects. It is also true that not everyone has side effects from a drug.  · Talk to your doctor or pharmacist and ask about the most common side effects and what to watch for.  · Learn how to manage any side effects you may have.  · Do not stop taking a drug without talking to your doctor. The doctor may be able to order a different drug that works better for you.  · If you cannot tolerate a side effect, call your doctor right away.  Im not sure how or when to take my medicine.   Knowing about your drugs, how to take them, and why you take them are all important parts of keeping you safe.  · Make sure you know the name of the drug. Know the difference between the brand name and the generic name. Ask the doctor, "Is this the brand name or the generic name?" or "Is there a generic for this drug?"  · Make sure you know why you are taking the drug. Ask the doctor, "What does this drug treat?"  · Make sure you know the right dose and when you should take the drug. Ask the doctor, How much should I take? When do I need to take this drug? Is there anything special I need to know about how to take this " "drug?  · Keep a calendar that lists your doses and the times they are due. This is very helpful if you are on a drug that you will only take for a short time, like an antibiotic. You can cross off each dose as it is taken to be sure you do not miss any.  I feel better. Do I really need to keep taking my medicine?   It depends on what kind of drugs you are taking and why you take them. You may be feeling better because of the drugs and will feel worse or become sicker if you stop. It can be very risky to stop some drugs before you are supposed to.  · When you start any kind of drug, ask your doctor, "How long will it take for the drug to start working and how will I know if it is working?"  · Also make sure you know how long you will need this drug. Ask your doctor, "How long will I need to take this drug?"  · Know that if you stop some drugs too soon, they may not work for your condition next time you need them.  · Know that it can be harmful to stop some drugs quickly. Doing so may cause side effects or health problems.  · Since you are feeling better, you may not have the physical reminder of being sick to take your drugs. Use other tools like a calendar, alarm, or note to help you remember.  · Be sure to get a refill before you run out of your drug.  I feel worse on the drug than I did before I started taking it. Should I stop it?   It depends on what kind of drug you are taking and why your doctor has ordered them for you. Some drugs do have side effects. The side effects may go away after a short time or they may be less harmful than not treating your condition. It can be very risky to stop some drugs before you are supposed to.  · When you start any kind of drug, ask your doctor, What side effects should I expect? and What should I do if I have side effects?  · Also ask your doctor, How long will I need to take this drug? and What would happen if I stopped taking it? Know that it can be harmful to " stop some drugs abruptly.  · Know that for some drugs, you might not necessarily feel better right away but it is still important to continue them as prescribed to help you stay healthy.  · If you have side effects you cannot tolerate, call your doctor right away.  · If you have mild side effects, be sure to discuss them with your doctor at your follow-up appointment.  When do I need to call the doctor?   · If you have questions about your drugs, or why you are taking them  · If you need refills on any of your prescriptions  · If you have side effects  Where can I learn more?   [place] Food and Drug Administration  https://www.fda.gov/drugs/resources-you-drugs/stop-learn-go-tips-talking-your-pharmacist-learn-how-use-medicines-safely   Last Reviewed Date   2021-08-31  Consumer Information Use and Disclaimer   This information is not specific medical advice and does not replace information you receive from your health care provider. This is only a brief summary of general information. It does NOT include all information about conditions, illnesses, injuries, tests, procedures, treatments, therapies, discharge instructions or life-style choices that may apply to you. You must talk with your health care provider for complete information about your health and treatment options. This information should not be used to decide whether or not to accept your health care providers advice, instructions or recommendations. Only your health care provider has the knowledge and training to provide advice that is right for you.  Copyright   Copyright © 2021 [employer] and its affiliates and/or licensors. All rights reserved.    1. Seizure Precautions: No tub baths, no swimming alone, no climbing ladders, no operating heavy equipment or machinery, no working around places of fire or heat or any other activity that may cause harm or death to self or others if a seizure were to occur.  --Bone health: be sure to take in calcium and  vitamin d such as milk, exercise and smoking cessation.  --Take medications as prescribed.  --Pregnancy:  One anti seizure medication increase the risk for birth defects and for each additional anti seizure medication the risk increases.  --There is an increase risk for sudden death with seizure patient.  --Must be seizure free for 6 consecutive months in the state of [place] to drive.  --Go to ER for 2 or mores seizures in one day or any seizure lasting greater than 5 minutes  2. Fall precautions  3. Labs per nursing facility   4. Continue Keppra 500 mg one twice a day  5. Follow up with Neurology in 3 months or sooner if needed

## 2022-04-25 ENCOUNTER — ANESTHESIA (OUTPATIENT)
Dept: SURGERY | Facility: HOSPITAL | Age: 67
End: 2022-04-25
Payer: MEDICARE

## 2022-04-25 ENCOUNTER — ANESTHESIA EVENT (OUTPATIENT)
Dept: SURGERY | Facility: HOSPITAL | Age: 67
End: 2022-04-25
Payer: COMMERCIAL

## 2022-04-25 ENCOUNTER — HOSPITAL ENCOUNTER (OUTPATIENT)
Facility: HOSPITAL | Age: 67
Discharge: HOME | End: 2022-04-25
Attending: DENTIST | Admitting: DENTIST
Payer: COMMERCIAL

## 2022-04-25 VITALS
TEMPERATURE: 98 F | OXYGEN SATURATION: 96 % | HEART RATE: 84 BPM | BODY MASS INDEX: 19.12 KG/M2 | DIASTOLIC BLOOD PRESSURE: 67 MMHG | RESPIRATION RATE: 11 BRPM | WEIGHT: 112 LBS | HEIGHT: 64 IN | SYSTOLIC BLOOD PRESSURE: 103 MMHG

## 2022-04-25 DIAGNOSIS — K02.9 DENTAL CARIES EXTENDING INTO PULP: ICD-10-CM

## 2022-04-25 PROCEDURE — D9220A PRA ANESTHESIA: ICD-10-PCS | Mod: CRNA,,, | Performed by: NURSE ANESTHETIST, CERTIFIED REGISTERED

## 2022-04-25 PROCEDURE — 25000003 PHARM REV CODE 250: Performed by: NURSE ANESTHETIST, CERTIFIED REGISTERED

## 2022-04-25 PROCEDURE — 27000165 HC TUBE, ETT CUFFED: Performed by: ANESTHESIOLOGY

## 2022-04-25 PROCEDURE — 37000009 HC ANESTHESIA EA ADD 15 MINS: Performed by: DENTIST

## 2022-04-25 PROCEDURE — 27000655: Performed by: ANESTHESIOLOGY

## 2022-04-25 PROCEDURE — D9220A PRA ANESTHESIA: Mod: ANES,,, | Performed by: ANESTHESIOLOGY

## 2022-04-25 PROCEDURE — 27000716 HC OXISENSOR PROBE, ANY SIZE: Performed by: ANESTHESIOLOGY

## 2022-04-25 PROCEDURE — 36000705 HC OR TIME LEV I EA ADD 15 MIN: Performed by: DENTIST

## 2022-04-25 PROCEDURE — 63600175 PHARM REV CODE 636 W HCPCS: Performed by: NURSE ANESTHETIST, CERTIFIED REGISTERED

## 2022-04-25 PROCEDURE — 27000260 *HC AIRWAY ORAL: Performed by: ANESTHESIOLOGY

## 2022-04-25 PROCEDURE — 27201423 OPTIME MED/SURG SUP & DEVICES STERILE SUPPLY: Performed by: DENTIST

## 2022-04-25 PROCEDURE — 71000016 HC POSTOP RECOV ADDL HR: Performed by: DENTIST

## 2022-04-25 PROCEDURE — 37000008 HC ANESTHESIA 1ST 15 MINUTES: Performed by: DENTIST

## 2022-04-25 PROCEDURE — 01999 UNLISTED ANES PROCEDURE: CPT | Performed by: DENTIST

## 2022-04-25 PROCEDURE — 27000689 HC BLADE LARYNGOSCOPE ANY SIZE: Performed by: ANESTHESIOLOGY

## 2022-04-25 PROCEDURE — 71000015 HC POSTOP RECOV 1ST HR: Performed by: DENTIST

## 2022-04-25 PROCEDURE — D9220A PRA ANESTHESIA: Mod: CRNA,,, | Performed by: NURSE ANESTHETIST, CERTIFIED REGISTERED

## 2022-04-25 PROCEDURE — 71000033 HC RECOVERY, INTIAL HOUR: Performed by: DENTIST

## 2022-04-25 PROCEDURE — 36000704 HC OR TIME LEV I 1ST 15 MIN: Performed by: DENTIST

## 2022-04-25 PROCEDURE — 25000003 PHARM REV CODE 250: Performed by: DENTIST

## 2022-04-25 PROCEDURE — D9220A PRA ANESTHESIA: ICD-10-PCS | Mod: ANES,,, | Performed by: ANESTHESIOLOGY

## 2022-04-25 RX ORDER — MEPERIDINE HYDROCHLORIDE 25 MG/ML
25 INJECTION INTRAMUSCULAR; INTRAVENOUS; SUBCUTANEOUS EVERY 10 MIN PRN
Status: DISCONTINUED | OUTPATIENT
Start: 2022-04-25 | End: 2022-04-25 | Stop reason: HOSPADM

## 2022-04-25 RX ORDER — LIDOCAINE HYDROCHLORIDE 20 MG/ML
INJECTION, SOLUTION EPIDURAL; INFILTRATION; INTRACAUDAL; PERINEURAL
Status: DISCONTINUED | OUTPATIENT
Start: 2022-04-25 | End: 2022-04-25

## 2022-04-25 RX ORDER — CEFAZOLIN SODIUM 1 G/3ML
INJECTION, POWDER, FOR SOLUTION INTRAMUSCULAR; INTRAVENOUS
Status: DISCONTINUED | OUTPATIENT
Start: 2022-04-25 | End: 2022-04-25

## 2022-04-25 RX ORDER — MORPHINE SULFATE 10 MG/ML
4 INJECTION INTRAMUSCULAR; INTRAVENOUS; SUBCUTANEOUS EVERY 5 MIN PRN
Status: DISCONTINUED | OUTPATIENT
Start: 2022-04-25 | End: 2022-04-25 | Stop reason: HOSPADM

## 2022-04-25 RX ORDER — SODIUM CHLORIDE, SODIUM LACTATE, POTASSIUM CHLORIDE, CALCIUM CHLORIDE 600; 310; 30; 20 MG/100ML; MG/100ML; MG/100ML; MG/100ML
INJECTION, SOLUTION INTRAVENOUS CONTINUOUS PRN
Status: DISCONTINUED | OUTPATIENT
Start: 2022-04-25 | End: 2022-04-25

## 2022-04-25 RX ORDER — PHENYLEPHRINE HYDROCHLORIDE 10 MG/ML
INJECTION INTRAVENOUS
Status: DISCONTINUED | OUTPATIENT
Start: 2022-04-25 | End: 2022-04-25

## 2022-04-25 RX ORDER — HYDROMORPHONE HYDROCHLORIDE 2 MG/ML
0.5 INJECTION, SOLUTION INTRAMUSCULAR; INTRAVENOUS; SUBCUTANEOUS EVERY 5 MIN PRN
Status: DISCONTINUED | OUTPATIENT
Start: 2022-04-25 | End: 2022-04-25 | Stop reason: HOSPADM

## 2022-04-25 RX ORDER — ROCURONIUM BROMIDE 10 MG/ML
INJECTION, SOLUTION INTRAVENOUS
Status: DISCONTINUED | OUTPATIENT
Start: 2022-04-25 | End: 2022-04-25

## 2022-04-25 RX ORDER — ONDANSETRON 2 MG/ML
4 INJECTION INTRAMUSCULAR; INTRAVENOUS DAILY PRN
Status: DISCONTINUED | OUTPATIENT
Start: 2022-04-25 | End: 2022-04-25 | Stop reason: HOSPADM

## 2022-04-25 RX ORDER — DIPHENHYDRAMINE HYDROCHLORIDE 50 MG/ML
25 INJECTION INTRAMUSCULAR; INTRAVENOUS EVERY 6 HOURS PRN
Status: DISCONTINUED | OUTPATIENT
Start: 2022-04-25 | End: 2022-04-25 | Stop reason: HOSPADM

## 2022-04-25 RX ORDER — ONDANSETRON 2 MG/ML
INJECTION INTRAMUSCULAR; INTRAVENOUS
Status: DISCONTINUED | OUTPATIENT
Start: 2022-04-25 | End: 2022-04-25

## 2022-04-25 RX ORDER — SODIUM CHLORIDE 0.9 % (FLUSH) 0.9 %
10 SYRINGE (ML) INJECTION
Status: DISCONTINUED | OUTPATIENT
Start: 2022-04-25 | End: 2022-04-25 | Stop reason: HOSPADM

## 2022-04-25 RX ORDER — FENTANYL CITRATE 50 UG/ML
INJECTION, SOLUTION INTRAMUSCULAR; INTRAVENOUS
Status: DISCONTINUED | OUTPATIENT
Start: 2022-04-25 | End: 2022-04-25

## 2022-04-25 RX ORDER — PROPOFOL 10 MG/ML
VIAL (ML) INTRAVENOUS
Status: DISCONTINUED | OUTPATIENT
Start: 2022-04-25 | End: 2022-04-25

## 2022-04-25 RX ORDER — LIDOCAINE HYDROCHLORIDE AND EPINEPHRINE 20; 10 MG/ML; UG/ML
INJECTION, SOLUTION INFILTRATION; PERINEURAL
Status: DISCONTINUED | OUTPATIENT
Start: 2022-04-25 | End: 2022-04-25 | Stop reason: HOSPADM

## 2022-04-25 RX ADMIN — CEFAZOLIN 2000 MG: 1 INJECTION, POWDER, FOR SOLUTION INTRAMUSCULAR; INTRAVENOUS; PARENTERAL at 09:04

## 2022-04-25 RX ADMIN — LIDOCAINE HYDROCHLORIDE 50 MG: 20 INJECTION, SOLUTION EPIDURAL; INFILTRATION; INTRACAUDAL; PERINEURAL at 09:04

## 2022-04-25 RX ADMIN — Medication 2 SPRAY: at 09:04

## 2022-04-25 RX ADMIN — SUGAMMADEX 200 MG: 100 INJECTION, SOLUTION INTRAVENOUS at 09:04

## 2022-04-25 RX ADMIN — ONDANSETRON 4 MG: 2 INJECTION INTRAMUSCULAR; INTRAVENOUS at 09:04

## 2022-04-25 RX ADMIN — PHENYLEPHRINE HYDROCHLORIDE 200 MCG: 10 INJECTION INTRAVENOUS at 09:04

## 2022-04-25 RX ADMIN — ROCURONIUM BROMIDE 40 MG: 10 INJECTION, SOLUTION INTRAVENOUS at 09:04

## 2022-04-25 RX ADMIN — FENTANYL CITRATE 25 MCG: 50 INJECTION INTRAMUSCULAR; INTRAVENOUS at 09:04

## 2022-04-25 RX ADMIN — PROPOFOL 150 MG: 10 INJECTION, EMULSION INTRAVENOUS at 09:04

## 2022-04-25 RX ADMIN — SODIUM CHLORIDE, POTASSIUM CHLORIDE, SODIUM LACTATE AND CALCIUM CHLORIDE: 600; 310; 30; 20 INJECTION, SOLUTION INTRAVENOUS at 08:04

## 2022-04-25 NOTE — H&P
History & Physical    SUBJECTIVE:     Chief Complaint/Reason for Admission: Patient referred from [facility] for extraction of carious teeth due to decreased mastication and generalized gross decay    History of Present Illness:  Patient is a 66 y.o. female presents with dental decay. Onset of symptoms was gradual starting several months ago with gradually worsening course since that time.     PTA Medications   Medication Sig    acetaminophen (TYLENOL) 500 MG tablet Take 500 mg by mouth every 6 (six) hours as needed for Pain.    cran/vitC/mannose/FOS/bromeln (UTI-STAT ORAL) Take 30 mLs by mouth 2 (two) times daily.    docusate sodium (COLACE) 100 MG capsule Take 100 mg by mouth 2 (two) times daily.    DULCOLAX, BISACODYL, ORAL Take 2 tablets by mouth. 2 tabs 3x week m/w/f    esomeprazole (NEXIUM) 40 MG capsule     folic acid (FOLVITE) 1 MG tablet Take 1 mg by mouth once daily.    INVEGA SUSTENNA 117 mg/0.75 mL Syrg injection 117 mg.     lactulose (CHRONULAC) 10 gram/15 mL solution 60 cc po qid    levETIRAcetam (KEPPRA) 500 MG Tab     multivitamin with minerals tablet Take 1 tablet by mouth once daily.    pantoprazole (PROTONIX) 40 MG tablet Take 1 tablet (40 mg total) by mouth once daily.       Review of patient's allergies indicates:   Allergen Reactions    Ativan [lorazepam]     Prolixin [fluphenazine hcl]                  Review of Systems  Anesthesia Hx:  No problems with previous Anesthesia   Social:  Non-Smoker, No Alcohol Use    EENT/Dental:   multiple dental caries   Hepatic/GI:   PUD, GERD, well controlled    Psych:   Psychiatric History        Family History   Family history unknown: Yes     Social History     Tobacco Use    Smoking status: Never Smoker    Smokeless tobacco: Never Used   Substance Use Topics    Alcohol use: Not Currently    Drug use: Never       OBJECTIVE:     Vital Signs (Most Recent)  Temp: 97.8 °F (36.6 °C) (04/25/22 0749)  Pulse: 78 (04/25/22 0749)  Resp: 16 (04/25/22  0749)  BP: 106/71 (04/25/22 0750)  SpO2: 96 % (04/25/22 0749)      ASSESSMENT/PLAN:     Multiple decayed teeth with gross dental decay throughout.  Plan extraction of teeth 2, 3, 6, 11, 13, 14, 15, 28, 29

## 2022-04-25 NOTE — PLAN OF CARE
Sitting up in chair. Sipping juice. No s/s of active bleeding noted. Awaiting transportation. NH staff present

## 2022-04-25 NOTE — TRANSFER OF CARE
"Anesthesia Transfer of Care Note    Patient: Suzie Hennessy    Procedure(s) Performed: Procedure(s) (LRB):  EXTRACTION, TOOTH (N/A)    Patient location: PACU    Anesthesia Type: general    Transport from OR: Transported from OR on room air with adequate spontaneous ventilation    Post pain: adequate analgesia    Post assessment: no apparent anesthetic complications    Post vital signs: stable    Level of consciousness: responds to stimulation    Nausea/Vomiting: no nausea/vomiting    Complications: none    Transfer of care protocol was followed      Last vitals:   Visit Vitals  /71   Pulse 78   Temp 36.6 °C (97.8 °F) (Axillary)   Resp 16   Ht 5' 4" (1.626 m)   Wt 50.8 kg (112 lb)   SpO2 96%   Breastfeeding No   BMI 19.22 kg/m²     "

## 2022-04-25 NOTE — OR NURSING
0947 RECEIVED PT FROM OR. PT RESTING WITH EYES CLOSED. NO DISTRESS NOTED. GAUZE NOTED TO BOTH SIDES OF MOUTH    1000 PT RESTING WITH EYES CLOSED    1015 PT RESTING QUIETLY. AWAKE, DROWSY. NO DISTRESS NOTED.  1020 PT SITTER NOTIFIED OF RETURN TO ROOM    1022 PT TO ROOM VIA STRETCHER    1027 PT RELEASED TP DAVEY KEY RN  SITTER AT BEDSIDE  114/64, 68, 14, 95%(ROOM AIR)

## 2022-04-25 NOTE — ANESTHESIA POSTPROCEDURE EVALUATION
Anesthesia Post Evaluation    Patient: Suzie Hennessy    Procedure(s) Performed: Procedure(s) (LRB):  EXTRACTION, TOOTH (N/A)    Final Anesthesia Type: general      Patient location during evaluation: PACU  Patient participation: No - Unable to Participate, Coma/Other Inability to Communicate  Level of consciousness: awake  Post-procedure vital signs: reviewed and stable  Pain management: adequate  Airway patency: patent  THOMAS mitigation strategies: Extubation and recovery carried out in lateral, semiupright, or other nonsupine position  PONV status at discharge: No PONV  Anesthetic complications: no      Cardiovascular status: hemodynamically stable  Respiratory status: unassisted  Hydration status: euvolemic  Follow-up not needed.          Vitals Value Taken Time   /67 04/25/22 1200   Temp 36.4 °C (97.5 °F) 04/25/22 1215   Pulse 84 04/25/22 1215   Resp 9 04/25/22 1023   SpO2 96 % 04/25/22 1215   Vitals shown include unvalidated device data.      Event Time   Out of Recovery 10:22:25         Pain/Fei Score: Fei Score: 9 (4/25/2022 10:15 AM)

## 2022-04-25 NOTE — PLAN OF CARE
Called pt's sister (Allie Hinkle) who gave phone consent for pt, to speak with Dr Garay regarding anesthesia consent.

## 2022-04-25 NOTE — OP NOTE
[facility] - Peri Services  Oromaxillofacial Surgery  Operative Note    SUMMARY     Date of Procedure: 4/25/2022     Procedure: Procedure(s) (LRB):  EXTRACTION, TOOTH (N/A) Teeth # 2, 3, 6, 11, 13, 14, 15, 28, 29    Surgeon(s) and Role:     * Rob Farris DMD, MD - Primary    Assisting Surgeon: None    Pre-Operative Diagnosis: Dental decay [K02.9]    Post-Operative Diagnosis: Post-Op Diagnosis Codes:     * Dental decay [K02.9]    Anesthesia: General    Operative Findings (including complications, if any): multiple carious teeth    Description of Technical Procedures:  Patient was brought into the operating room and placed on operating room table in spine position.  Anesthesia monitors were placed patient brought anesthetic plan intravenously.  Patient was endotracheally intubated without complications.  Posterior pharyngeal throat pack was placed oral cavity is clean spacer draped in standard sterile fashion patient was anesthetized with 41.8 cc Carpules of Lidocaine 2% with 1;100,000 epi.  Attention was then directed to the maxilla 15. Scalpel blade was used to make an incision around necks of maxillary remaining teeth.  With distal laterally cysts.  Bones remain around neck a TTE to elevate extracted without complications bone was smoothed with a bone file studies were irrigated flaps were reapproximated and closed with 3-0 chromic gut suture in interrupted fashion.  Attention directed to the mandible 2820 none incision made around the neck of the tape with distal lateral leads.  Bones removed around neck a TTE to elevate extracted bone with smooth the bone file site was irrigated flap reapproximated and closed with 3-0 chromic gut suture in interrupted fashion.  Hemostasis was noted in all areas posterior pharyngeal throat pack removed patient was extubated and transported to read uneventful course.    Significant Surgical Tasks Conducted by the Assistant(s), if Applicable: none    Estimated Blood  Loss (EBL): * No values recorded between 4/25/2022  9:15 AM and 4/25/2022  9:45 AM *           Implants: * No implants in log *    Specimens:   Specimen (24h ago, onward)            None           Condition: Good    Disposition: PACU - hemodynamically stable.    Attestation: I was present and scrubbed for the entire procedure.

## 2022-04-25 NOTE — ANESTHESIA PREPROCEDURE EVALUATION
04/25/2022  Suzie Hennessy is a 66 y.o., female.      Pre-op Assessment    I have reviewed the Patient Summary Reports.    I have reviewed the NPO Status.   I have reviewed the Medications.     Review of Systems         Anesthesia Plan  Type of Anesthesia, risks & benefits discussed:    Anesthesia Type: Gen ETT  Intra-op Monitoring Plan: Standard ASA Monitors  Post Op Pain Control Plan: IV/PO Opioids PRN  Induction:  IV  Informed Consent: Informed consent signed with the Patient representative and all parties understand the risks and agree with anesthesia plan.  All questions answered.   ASA Score: 3    Ready For Surgery From Anesthesia Perspective.     .   NPO greater than 8 hours  NAC  Allergies   Ativan [Lorazepam]  Not Specified  8/25/2021    Prolixin [Fluphenazine Hcl]  Not Specified  8/25/2021      Hct 37  9/26/21 EKG:     Other Medical History   GERD (gastroesophageal reflux disease) Schizophrenia   Memory loss Hyperlipidemia   Constipation Seizures   Hematemesis with nausea Gastroesophageal reflux disease with esophagitis without hemorrhage   Dementia  H/o GI bleed  Frequent UTI's            Airway exam deferred (COVID precautions)
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
## 2022-05-31 ENCOUNTER — OFFICE VISIT (OUTPATIENT)
Dept: NEUROLOGY | Facility: CLINIC | Age: 67
End: 2022-05-31
Payer: MEDICARE

## 2022-05-31 VITALS
WEIGHT: 112 LBS | BODY MASS INDEX: 18.66 KG/M2 | OXYGEN SATURATION: 97 % | HEART RATE: 97 BPM | DIASTOLIC BLOOD PRESSURE: 64 MMHG | SYSTOLIC BLOOD PRESSURE: 116 MMHG | HEIGHT: 65 IN

## 2022-05-31 DIAGNOSIS — R56.9 CONVULSIONS, UNSPECIFIED CONVULSION TYPE: Primary | Chronic | ICD-10-CM

## 2022-05-31 DIAGNOSIS — R26.89 BALANCE PROBLEM: Chronic | ICD-10-CM

## 2022-05-31 DIAGNOSIS — Z79.899 ON LONG TERM DRUG THERAPY: Chronic | ICD-10-CM

## 2022-05-31 PROCEDURE — 99215 OFFICE O/P EST HI 40 MIN: CPT | Mod: PBBFAC | Performed by: NURSE PRACTITIONER

## 2022-05-31 PROCEDURE — 99214 OFFICE O/P EST MOD 30 MIN: CPT | Mod: S$PBB,,, | Performed by: NURSE PRACTITIONER

## 2022-05-31 PROCEDURE — 99214 PR OFFICE/OUTPT VISIT, EST, LEVL IV, 30-39 MIN: ICD-10-PCS | Mod: S$PBB,,, | Performed by: NURSE PRACTITIONER

## 2022-05-31 RX ORDER — AMOXICILLIN 250 MG/1
CAPSULE ORAL
COMMUNITY
Start: 2022-04-25 | End: 2022-12-13

## 2022-05-31 RX ORDER — OMEPRAZOLE 40 MG/1
40 CAPSULE, DELAYED RELEASE ORAL DAILY
Status: ON HOLD | COMMUNITY
End: 2022-12-19 | Stop reason: SDUPTHER

## 2022-05-31 RX ORDER — ACETAMINOPHEN AND CODEINE PHOSPHATE 300; 30 MG/1; MG/1
TABLET ORAL
COMMUNITY
Start: 2022-04-25 | End: 2022-12-13

## 2022-05-31 NOTE — PATIENT INSTRUCTIONS
1. Seizure Precautions: No tub baths, no swimming alone, no climbing ladders, no operating heavy equipment or machinery, no working around places of fire or heat or any other activity that may cause harm or death to self or others if a seizure were to occur.  --Bone health: be sure to take in calcium and vitamin d such as milk, exercise and smoking cessation.  --Take medications as prescribed.  --Pregnancy:  One anti seizure medication increase the risk for birth defects and for each additional anti seizure medication the risk increases.  --There is an increase risk for sudden death with seizure patient.  --Must be seizure free for 6 consecutive months in the state of [place] to drive.  --Go to ER for 2 or mores seizures in one day or any seizure lasting greater than 5 minutes  2. Fall precautions  3. Labs per nursing facility   4. Continue Keppra 1000 mg one twice a day  5. Follow up with Neurology in 3 months or sooner if needed

## 2022-06-16 ENCOUNTER — HOSPITAL ENCOUNTER (EMERGENCY)
Facility: HOSPITAL | Age: 67
Discharge: HOME | End: 2022-06-16
Attending: FAMILY MEDICINE
Payer: MEDICARE

## 2022-06-16 VITALS
BODY MASS INDEX: 19.99 KG/M2 | OXYGEN SATURATION: 96 % | SYSTOLIC BLOOD PRESSURE: 105 MMHG | TEMPERATURE: 99 F | WEIGHT: 120 LBS | DIASTOLIC BLOOD PRESSURE: 61 MMHG | HEIGHT: 65 IN | HEART RATE: 67 BPM | RESPIRATION RATE: 16 BRPM

## 2022-06-16 DIAGNOSIS — S09.90XA INJURY OF HEAD, INITIAL ENCOUNTER: ICD-10-CM
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
## 2022-06-16 PROCEDURE — 99282 PR EMERGENCY DEPT VISIT,LEVEL II: ICD-10-PCS | Mod: ,,, | Performed by: FAMILY MEDICINE

## 2022-06-16 PROCEDURE — 99284 EMERGENCY DEPT VISIT MOD MDM: CPT | Mod: 25

## 2022-06-16 PROCEDURE — 99282 EMERGENCY DEPT VISIT SF MDM: CPT | Mod: ,,, | Performed by: FAMILY MEDICINE

## 2022-06-16 PROCEDURE — 25000003 PHARM REV CODE 250: Performed by: FAMILY MEDICINE

## 2022-06-16 RX ADMIN — BACITRACIN ZINC, NEOMYCIN SULFATE, POLYMYXIN B SULFATE 1 EACH: 3.5; 5000; 4 OINTMENT TOPICAL at 11:06

## 2022-06-16 NOTE — ED TRIAGE NOTES
Pt presents to ed via [facility] from [facility], ems states they were called due to pt falling this am. Nursing home states pt fell between [id number] . Pt has noted laceration to forehead covered by steri strips and 4x4 gauze.

## 2022-06-16 NOTE — ED PROVIDER NOTES
Encounter Date: 6/16/2022       History     Chief Complaint   Patient presents with    Fall    Laceration     65 yo with PMH of Schizophrenia presents from nursing home after a fall. Unable to obtain history from pt 2/2 pt Pt not responding to questions 2/2 psychiatrist disorder. This was reported to be her baseline from the nursing home.         Review of patient's allergies indicates:   Allergen Reactions    Ativan [lorazepam]     Prolixin [fluphenazine hcl]      Past Medical History:   Diagnosis Date    Constipation     Gastroesophageal reflux disease with esophagitis without hemorrhage 9/26/2021    GERD (gastroesophageal reflux disease)     Hematemesis with nausea 9/26/2021    Hyperlipidemia     Memory loss     Schizophrenia     Seizures     Ulcer of esophagus without bleeding 9/26/2021     Past Surgical History:   Procedure Laterality Date    EXTRACTION OF TOOTH N/A 4/25/2022    Procedure: EXTRACTION, TOOTH;  Surgeon: Rbo Farris DMD, MD;  Location: [facility];  Service: Oral Surgery;  Laterality: N/A;     Family History   Family history unknown: Yes     Social History     Tobacco Use    Smoking status: Never Smoker    Smokeless tobacco: Never Used   Substance Use Topics    Alcohol use: Not Currently    Drug use: Never     Review of Systems   Unable to perform ROS: Psychiatric disorder       Physical Exam     Initial Vitals   BP Pulse Resp Temp SpO2   06/16/22 0957 06/16/22 0952 06/16/22 0958 06/16/22 0952 06/16/22 0957   104/65 77 16 98.8 °F (37.1 °C) (!) 94 %      MAP       --                Physical Exam    Nursing note and vitals reviewed.  Constitutional: She appears well-developed and well-nourished. She is not diaphoretic. No distress.   HENT:   Head: Normocephalic and atraumatic.   Right Ear: External ear normal.   Left Ear: External ear normal.   4 cm and 3 cm superficial laceration on superior midline forehead.    Eyes: Pupils are equal, round, and reactive to light.    Cardiovascular: Normal rate, regular rhythm and normal heart sounds.   No murmur heard.  Pulmonary/Chest: Breath sounds normal. No respiratory distress. She has no wheezes.   Abdominal: Abdomen is soft. Bowel sounds are normal.   Musculoskeletal:      Comments: Cervical, thoracic and lumbar spine without notable abnormalities on physical exam     Neurological:   Pt not responding to questions 2/2 psychiatrist disorder   Skin: Skin is warm and dry.   Psychiatric:   Pt not responding to questions 2/2 psychiatrist disorder         Medical Screening Exam   See Full Note    ED Course   Procedures  Labs Reviewed - No data to display       Imaging Results          CT Head Without Contrast (Final result)  Result time 06/16/22 10:22:01    Final result by Manuel Flower DO (06/16/22 10:22:01)                 Impression:      No convincing imaging evidence of acute intracranial abnormality.    The CT exam was performed using one or more of the following dose    reduction techniques- Automated exposure control, adjustment of the mA    and/or kV according to patient size, and/or use of iterative    reconstructed technique.    Point of Service: [facility]      Electronically signed by: Manuel Flower  Date:    06/16/2022  Time:    10:22             Narrative:    EXAMINATION:  CT HEAD WITHOUT CONTRAST    CLINICAL HISTORY:  Head trauma, moderate-severe;head trauma;    COMPARISON:  CT brain September 25, 2021    TECHNIQUE:  Multiple axial tomographic images of the brain were obtained without the use of intravenous contrast.    FINDINGS:  Midline structures are nondisplaced.  No convincing evidence of acute intracranial hemorrhage.  No convincing evidence of hydrocephalus.  Visualized paranasal sinuses and mastoid air cells are predominantly clear.                               CT Cervical Spine Without Contrast (Final result)  Result time 06/16/22 10:26:01    Final result by Manuel Flower DO (06/16/22 10:26:01)                  Impression:      No convincing CT evidence of acute injury involving the osseous cervical spine.  Degenerative change and malalignment of the cervical spine as detailed above. Peripherally calcified left thyroid lesion measuring up to 2.2 cm.  Recommend correlation with thyroid ultrasound.    The CT exam was performed using one or more of the following dose    reduction techniques- Automated exposure control, adjustment of the mA    and/or kV according to patient size, and/or use of iterative    reconstructed technique.    Point of Service: [facility]      Electronically signed by: Manuel Flower  Date:    06/16/2022  Time:    10:26             Narrative:    EXAMINATION:  CT CERVICAL SPINE WITHOUT CONTRAST    CLINICAL HISTORY:  Neck trauma (Age >= 65y);head trauma;    COMPARISON:  Cervical spine CT June 1, 2000 20    TECHNIQUE:  Multiple axial tomographic images of the cervical spine were obtained without the use of intravenous contrast. Coronal and sagittal reformatted images provided.    FINDINGS:  There is straightening of normal cervical lordosis which may be positional or secondary to muscle spasm. There is no significant anterolisthesis or retrolisthesis.  Moderate to severe loss of disc space height at C3-4, C4-5, C5-6, C6-7 as well as C7-T1.  Multilevel moderate marginal vertebral body osteophyte formation.  Multilevel posterior disc osteophyte formation with multilevel mild-to-moderate spinal canal narrowing.  Scattered posterior facet and uncovertebral joint hypertrophy.  Cervical vertebral body heights appear maintained.  Mild-to-moderate dextroconvex curvature of the cervical spine.  Peripherally calcified left thyroid lesion measuring up to 2.2 cm.                                 Medications - No data to display  Medical Decision Making:   ED Management:  CT head and neck without acute abnormalities. Antibacterial ointment and bandage applied to head laceration.              Attending Attestation:   Physician Attestation Statement for Resident:  As the supervising MD   Physician Attestation Statement: I have personally seen and examined this patient.   I agree with the above history. -:   As the supervising MD I agree with the above PE.    As the supervising MD I agree with the above treatment, course, plan, and disposition.                      Clinical Impression:   Final diagnoses:  [W19.XXXA] Fall, initial encounter (Primary)  [S09.90XA] Injury of head, initial encounter          ED Disposition Condition    Discharge Stable        ED Prescriptions     None        Follow-up Information    None          Ginna Clark MD  06/16/22 7548

## 2022-08-08 ENCOUNTER — HOSPITAL ENCOUNTER (EMERGENCY)
Facility: HOSPITAL | Age: 67
Discharge: HOME | End: 2022-08-08
Attending: FAMILY MEDICINE
Payer: MEDICARE

## 2022-08-08 VITALS
OXYGEN SATURATION: 98 % | WEIGHT: 122 LBS | DIASTOLIC BLOOD PRESSURE: 68 MMHG | TEMPERATURE: 98 F | RESPIRATION RATE: 18 BRPM | HEART RATE: 84 BPM | SYSTOLIC BLOOD PRESSURE: 118 MMHG | BODY MASS INDEX: 20.3 KG/M2
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
## 2022-08-08 PROCEDURE — 99284 EMERGENCY DEPT VISIT MOD MDM: CPT | Mod: 25

## 2022-08-08 PROCEDURE — 99282 PR EMERGENCY DEPT VISIT,LEVEL II: ICD-10-PCS | Mod: ,,, | Performed by: FAMILY MEDICINE

## 2022-08-08 PROCEDURE — 99282 EMERGENCY DEPT VISIT SF MDM: CPT | Mod: ,,, | Performed by: FAMILY MEDICINE

## 2022-08-08 NOTE — ED PROVIDER NOTES
Encounter Date: 8/8/2022    SCRIBE #1 NOTE: I, Tanesha Cason, am scribing for, and in the presence of,  Ginna Jo MD. I have scribed the entire note.       History     Chief Complaint   Patient presents with    Fall     Patient is a 66 y.o. female that presents to the emergency department via EMS due to a fall that occurred at nursing facility this afternoon. The staff at [facility] explain that the patient endured a fall injuring the knees and the left side of her face. They deny any changes in mental status. Patient is unable to provide Hx due to confusion on baseline. Staff deny any loss of consciousness and explain that it seems the patient just lost her balance. No other symptoms or Hx were provided.    The history is provided by a caregiver (Nursing home staff). No  was used.     Review of patient's allergies indicates:   Allergen Reactions    Ativan [lorazepam]     Prolixin [fluphenazine hcl]      Past Medical History:   Diagnosis Date    Constipation     Gastroesophageal reflux disease with esophagitis without hemorrhage 9/26/2021    GERD (gastroesophageal reflux disease)     Hematemesis with nausea 9/26/2021    Hyperlipidemia     Memory loss     Schizophrenia     Seizures     Ulcer of esophagus without bleeding 9/26/2021     Past Surgical History:   Procedure Laterality Date    EXTRACTION OF TOOTH N/A 4/25/2022    Procedure: EXTRACTION, TOOTH;  Surgeon: Rob Farris DMD, MD;  Location: [facility];  Service: Oral Surgery;  Laterality: N/A;     Family History   Family history unknown: Yes     Social History     Tobacco Use    Smoking status: Never Smoker    Smokeless tobacco: Never Used   Substance Use Topics    Alcohol use: Not Currently    Drug use: Never     Review of Systems   Unable to perform ROS: Other (Hx of Schizophrenia and confused on baseline)       Physical Exam     Initial Vitals [08/08/22 1844]   BP Pulse Resp Temp SpO2   124/74 94 18  98.1 °F (36.7 °C) 97 %      MAP       --         Physical Exam    Vitals reviewed.  Constitutional: She appears well-developed and well-nourished. No distress.   HENT:   Head: Normocephalic.   Eyes: Conjunctivae are normal. Pupils are equal, round, and reactive to light.   Cardiovascular: Normal rate, regular rhythm, normal heart sounds and intact distal pulses.   Pulmonary/Chest: Breath sounds normal.   Abdominal: Abdomen is soft. Bowel sounds are normal.   Musculoskeletal:      Cervical back: No tenderness.      Thoracic back: No tenderness.      Lumbar back: No tenderness.     Neurological: She is alert and oriented to person, place, and time.   Skin: Skin is warm and dry.   Psychiatric: She has a normal mood and affect.   Patient is confused on baseline         ED Course   Procedures  Labs Reviewed - No data to display       Imaging Results          X-Ray Knee 1 or 2 View Bilateral (Final result)  Result time 08/08/22 19:58:41    Final result by Stiven Mccallum MD (08/08/22 19:58:41)                 Impression:      No acute fracture    Osteoarthritis    Osteopenia    Joint effusion      Electronically signed by: Stiven Mccallum  Date:    08/08/2022  Time:    19:58             Narrative:    EXAMINATION:  XR KNEE 1 OR 2 VIEW BILATERAL    CLINICAL HISTORY:  .  Unspecified fall, initial encounter    COMPARISON:  No previous similar    TECHNIQUE:  AP and lateral views of the knees bilaterally.  Four total views    FINDINGS:  There is no acute fracture or dislocation.  There is mild degenerative joint space narrowing and osteophyte formation in the medial compartment of either knee.  Suspect osteopenia.  There is moderate suprapatellar joint effusion on the right.                               X-Ray Cervical Spine AP And Lateral (Final result)  Result time 08/08/22 19:57:25    Final result by Stiven Mccallum MD (08/08/22 19:57:25)                 Impression:      No acute fracture    Degenerative disc  disease    Osteopenia    Straightening of the spine      Electronically signed by: Stiven Mccallum  Date:    08/08/2022  Time:    19:57             Narrative:    EXAMINATION:  XR CERVICAL SPINE AP LATERAL    CLINICAL HISTORY:  fall;.    Trauma    COMPARISON:  No previous similar    TECHNIQUE:  Cervical spine AP, lateral, and open-mouth views    FINDINGS:  There is no acute fracture or prevertebral soft tissue swelling.  There is straightening of the spine which may be positional or related to muscle spasm.  There is moderate degenerative disc narrowing and spondylosis at C3-C4 through C6-C7.  There is moderate facet arthropathy.  Osteopenia                               CT Head Without Contrast (Final result)  Result time 08/08/22 19:29:09    Final result by Stiven Mccallum MD (08/08/22 19:29:09)                 Impression:      No acute intracranial process compared to the previous study      Electronically signed by: Stiven Mccallum  Date:    08/08/2022  Time:    19:29             Narrative:    EXAMINATION:  CT head without contrast    CLINICAL HISTORY:  fall; trauma.  Confusion    TECHNIQUE:  Transaxial CT sections were obtained through the brain without contrast.    The CT examination was performed using one or more of the following dose reduction techniques: Automated exposure control, adjustment of the mA and kV according to patient's size, use of acute or iterative reconstruction techniques.    COMPARISON:  CT head June 16, 2022    FINDINGS:  The ventricles are midline in position without evidence of hydrocephalus. There is no mass or area of parenchymal hemorrhage. There is no gross CT evidence of acute cortical stroke. There is no extra-axial hematoma. The partially visualized sinuses are generally clear. There is no obvious skull fracture.                                 Medications - No data to display             Attending Attestation:           Physician Attestation for Scribe:  Physician  Attestation Statement for Scribe #1: I, Ginna Jo MD, reviewed documentation, as scribed by Tanesha Cason in my presence, and it is both accurate and complete.                      Clinical Impression:   Final diagnoses:  [W19.XXXA] Fall          ED Disposition Condition    Discharge Stable        ED Prescriptions     None        Follow-up Information    None          Ginna Clark MD  08/09/22 0155

## 2022-08-08 NOTE — ED TRIAGE NOTES
Presents to ED via EMS from [facility] after trip and fall, staff states she fell on the left side of her body, has bruising to left side of face and c/o left elbow pain.

## 2022-08-12 ENCOUNTER — HOSPITAL ENCOUNTER (OUTPATIENT)
Dept: RADIOLOGY | Facility: HOSPITAL | Age: 67
Discharge: HOME | End: 2022-08-12
Payer: MEDICARE

## 2022-08-12 DIAGNOSIS — E07.89 HEMORRHAGE AND INFARCTION OF THYROID: ICD-10-CM

## 2022-08-12 PROCEDURE — 76536 US THYROID: ICD-10-PCS | Mod: 26,,, | Performed by: RADIOLOGY

## 2022-08-12 PROCEDURE — 76536 US EXAM OF HEAD AND NECK: CPT | Mod: TC

## 2022-08-12 PROCEDURE — 76536 US EXAM OF HEAD AND NECK: CPT | Mod: 26,,, | Performed by: RADIOLOGY

## 2022-08-19 ENCOUNTER — HOSPITAL ENCOUNTER (EMERGENCY)
Facility: HOSPITAL | Age: 67
Discharge: HOME | End: 2022-08-19
Attending: EMERGENCY MEDICINE
Payer: MEDICARE

## 2022-08-19 VITALS
HEIGHT: 67 IN | HEART RATE: 73 BPM | OXYGEN SATURATION: 100 % | DIASTOLIC BLOOD PRESSURE: 74 MMHG | SYSTOLIC BLOOD PRESSURE: 122 MMHG | WEIGHT: 122 LBS | RESPIRATION RATE: 15 BRPM | BODY MASS INDEX: 19.15 KG/M2 | TEMPERATURE: 98 F

## 2022-08-19 DIAGNOSIS — R56.9 SEIZURE: ICD-10-CM

## 2022-08-19 DIAGNOSIS — N39.0 URINARY TRACT INFECTION WITHOUT HEMATURIA, SITE UNSPECIFIED: ICD-10-CM

## 2022-08-19 DIAGNOSIS — G40.909 SEIZURE DISORDER: Primary | ICD-10-CM

## 2022-08-19 LAB
ALBUMIN SERPL BCP-MCNC: 3.5 G/DL (ref 3.5–5)
ALBUMIN/GLOB SERPL: 1 {RATIO}
ALP SERPL-CCNC: 95 U/L (ref 55–142)
ALT SERPL W P-5'-P-CCNC: 16 U/L (ref 13–56)
AMORPH PHOS CRY #/AREA URNS LPF: ABNORMAL /LPF
ANION GAP SERPL CALCULATED.3IONS-SCNC: 14 MMOL/L (ref 7–16)
AST SERPL W P-5'-P-CCNC: 18 U/L (ref 15–37)
BACTERIA #/AREA URNS HPF: ABNORMAL /HPF
BASOPHILS # BLD AUTO: 0.01 K/UL (ref 0–0.2)
BASOPHILS NFR BLD AUTO: 0.2 % (ref 0–1)
BILIRUB SERPL-MCNC: 0.5 MG/DL (ref 0–1.2)
BILIRUB UR QL STRIP: NEGATIVE
BUN SERPL-MCNC: 13 MG/DL (ref 7–18)
BUN/CREAT SERPL: 16 (ref 6–20)
CALCIUM SERPL-MCNC: 8.6 MG/DL (ref 8.5–10.1)
CHLORIDE SERPL-SCNC: 104 MMOL/L (ref 98–107)
CLARITY UR: ABNORMAL
CO2 SERPL-SCNC: 25 MMOL/L (ref 21–32)
COLOR UR: YELLOW
CREAT SERPL-MCNC: 0.79 MG/DL (ref 0.55–1.02)
DIFFERENTIAL METHOD BLD: ABNORMAL
EGFR (NO RACE VARIABLE) (RUSH/TITUS): 82 ML/MIN/1.73M²
EOSINOPHIL # BLD AUTO: 0.15 K/UL (ref 0–0.5)
EOSINOPHIL NFR BLD AUTO: 2.5 % (ref 1–4)
ERYTHROCYTE [DISTWIDTH] IN BLOOD BY AUTOMATED COUNT: 13 % (ref 11.5–14.5)
GLOBULIN SER-MCNC: 3.4 G/DL (ref 2–4)
GLUCOSE SERPL-MCNC: 88 MG/DL (ref 74–106)
GLUCOSE UR STRIP-MCNC: NORMAL MG/DL
HCT VFR BLD AUTO: 36.5 % (ref 38–47)
HGB BLD-MCNC: 12.2 G/DL (ref 12–16)
IMM GRANULOCYTES # BLD AUTO: 0.01 K/UL (ref 0–0.04)
IMM GRANULOCYTES NFR BLD: 0.2 % (ref 0–0.4)
KETONES UR STRIP-SCNC: NEGATIVE MG/DL
LEUKOCYTE ESTERASE UR QL STRIP: NEGATIVE
LYMPHOCYTES # BLD AUTO: 1.22 K/UL (ref 1–4.8)
LYMPHOCYTES NFR BLD AUTO: 20.2 % (ref 27–41)
MAGNESIUM SERPL-MCNC: 2 MG/DL (ref 1.7–2.3)
MCH RBC QN AUTO: 31.9 PG (ref 27–31)
MCHC RBC AUTO-ENTMCNC: 33.4 G/DL (ref 32–36)
MCV RBC AUTO: 95.5 FL (ref 80–96)
MONOCYTES # BLD AUTO: 0.37 K/UL (ref 0–0.8)
MONOCYTES NFR BLD AUTO: 6.1 % (ref 2–6)
MPC BLD CALC-MCNC: 10.2 FL (ref 9.4–12.4)
NEUTROPHILS # BLD AUTO: 4.27 K/UL (ref 1.8–7.7)
NEUTROPHILS NFR BLD AUTO: 70.8 % (ref 53–65)
NITRITE UR QL STRIP: POSITIVE
NRBC # BLD AUTO: 0 X10E3/UL
NRBC, AUTO (.00): 0 %
PH UR STRIP: 6 PH UNITS
PLATELET # BLD AUTO: 235 K/UL (ref 150–400)
POTASSIUM SERPL-SCNC: 3.8 MMOL/L (ref 3.5–5.1)
PROT SERPL-MCNC: 6.9 G/DL (ref 6.4–8.2)
PROT UR QL STRIP: 10
RBC # BLD AUTO: 3.82 M/UL (ref 4.2–5.4)
RBC # UR STRIP: NEGATIVE /UL
RBC #/AREA URNS HPF: ABNORMAL /[HPF]
SODIUM SERPL-SCNC: 139 MMOL/L (ref 136–145)
SP GR UR STRIP: 1.03
SQUAMOUS #/AREA URNS LPF: ABNORMAL /LPF
UROBILINOGEN UR STRIP-ACNC: NORMAL MG/DL
WBC # BLD AUTO: 6.03 K/UL (ref 4.5–11)
WBC #/AREA URNS HPF: ABNORMAL /HPF

## 2022-08-19 PROCEDURE — 99285 EMERGENCY DEPT VISIT HI MDM: CPT | Mod: 25

## 2022-08-19 PROCEDURE — 87086 URINE CULTURE/COLONY COUNT: CPT | Performed by: EMERGENCY MEDICINE

## 2022-08-19 PROCEDURE — 36415 COLL VENOUS BLD VENIPUNCTURE: CPT | Performed by: EMERGENCY MEDICINE

## 2022-08-19 PROCEDURE — 80053 COMPREHEN METABOLIC PANEL: CPT | Performed by: EMERGENCY MEDICINE

## 2022-08-19 PROCEDURE — 96375 TX/PRO/DX INJ NEW DRUG ADDON: CPT

## 2022-08-19 PROCEDURE — 85025 COMPLETE CBC W/AUTO DIFF WBC: CPT | Performed by: EMERGENCY MEDICINE

## 2022-08-19 PROCEDURE — 93005 ELECTROCARDIOGRAM TRACING: CPT

## 2022-08-19 PROCEDURE — 81001 URINALYSIS AUTO W/SCOPE: CPT | Performed by: EMERGENCY MEDICINE

## 2022-08-19 PROCEDURE — 63600175 PHARM REV CODE 636 W HCPCS: Performed by: EMERGENCY MEDICINE

## 2022-08-19 PROCEDURE — 93010 ELECTROCARDIOGRAM REPORT: CPT | Mod: ,,, | Performed by: INTERNAL MEDICINE

## 2022-08-19 PROCEDURE — 87077 CULTURE AEROBIC IDENTIFY: CPT | Performed by: EMERGENCY MEDICINE

## 2022-08-19 PROCEDURE — 99284 EMERGENCY DEPT VISIT MOD MDM: CPT | Mod: ,,, | Performed by: EMERGENCY MEDICINE

## 2022-08-19 PROCEDURE — 93010 EKG 12-LEAD: ICD-10-PCS | Mod: ,,, | Performed by: INTERNAL MEDICINE

## 2022-08-19 PROCEDURE — 96365 THER/PROPH/DIAG IV INF INIT: CPT

## 2022-08-19 PROCEDURE — 99284 PR EMERGENCY DEPT VISIT,LEVEL IV: ICD-10-PCS | Mod: ,,, | Performed by: EMERGENCY MEDICINE

## 2022-08-19 PROCEDURE — 83735 ASSAY OF MAGNESIUM: CPT | Performed by: EMERGENCY MEDICINE

## 2022-08-19 PROCEDURE — 25000003 PHARM REV CODE 250: Performed by: EMERGENCY MEDICINE

## 2022-08-19 RX ORDER — LEVETIRACETAM 500 MG/5ML
1000 INJECTION, SOLUTION, CONCENTRATE INTRAVENOUS
Status: COMPLETED | OUTPATIENT
Start: 2022-08-19 | End: 2022-08-19

## 2022-08-19 RX ORDER — SULFAMETHOXAZOLE AND TRIMETHOPRIM 200; 40 MG/5ML; MG/5ML
20 SUSPENSION ORAL EVERY 12 HOURS
Qty: 200 ML | Refills: 0 | Status: SHIPPED | OUTPATIENT
Start: 2022-08-19 | End: 2022-08-24

## 2022-08-19 RX ORDER — LEVETIRACETAM 100 MG/ML
1000 SOLUTION ORAL 2 TIMES DAILY
Qty: 600 ML | Refills: 1 | Status: SHIPPED | OUTPATIENT
Start: 2022-08-19 | End: 2022-09-18

## 2022-08-19 RX ADMIN — LEVETIRACETAM 1000 MG: 100 INJECTION, SOLUTION INTRAVENOUS at 03:08

## 2022-08-19 RX ADMIN — CEFTRIAXONE SODIUM 1 G: 1 INJECTION, POWDER, FOR SOLUTION INTRAMUSCULAR; INTRAVENOUS at 06:08

## 2022-08-19 NOTE — DISCHARGE INSTRUCTIONS
Return if symptoms worsen or new symptoms develop.  Encourage patient to comply with her seizure medication Keppra.

## 2022-08-19 NOTE — ED PROVIDER NOTES
Encounter Date: 8/19/2022       History     Chief Complaint   Patient presents with    Seizures    Altered Mental Status     Patient sent from [facility] due to seizure.  She was sent by EMS.  According to report patient had a 5 minute generalized tonic-clonic seizure.  Also she had a seizure yesterday.  Has been at least 2 weeks as patient has been taking her Keppra.  Otherwise she has been at baseline mental status.  No associated focal weakness or numbness.  No head injury.  Associated fever any other acute symptoms.        Review of patient's allergies indicates:   Allergen Reactions    Ativan [lorazepam]     Prolixin [fluphenazine hcl]      Past Medical History:   Diagnosis Date    Constipation     Gastroesophageal reflux disease with esophagitis without hemorrhage 9/26/2021    GERD (gastroesophageal reflux disease)     Hematemesis with nausea 9/26/2021    Hyperlipidemia     Memory loss     Schizophrenia     Seizures     Ulcer of esophagus without bleeding 9/26/2021     Past Surgical History:   Procedure Laterality Date    EXTRACTION OF TOOTH N/A 4/25/2022    Procedure: EXTRACTION, TOOTH;  Surgeon: Rob Farris DMD, MD;  Location: [facility];  Service: Oral Surgery;  Laterality: N/A;     Family History   Family history unknown: Yes     Social History     Tobacco Use    Smoking status: Never Smoker    Smokeless tobacco: Never Used   Substance Use Topics    Alcohol use: Not Currently    Drug use: Never     Review of Systems   Constitutional: Negative for fever.   HENT: Negative for sore throat.    Respiratory: Negative for shortness of breath.    Cardiovascular: Negative for chest pain.   Gastrointestinal: Negative for nausea.   Genitourinary: Negative for dysuria.   Musculoskeletal: Negative for back pain.   Skin: Negative for rash.   Neurological: Negative for weakness.   Hematological: Does not bruise/bleed easily.       Physical Exam     Initial Vitals [08/19/22  1530]   BP Pulse Resp Temp SpO2   (!) 104/58 83 15 98.1 °F (36.7 °C) 99 %      MAP       --         Physical Exam    Constitutional: She appears well-developed.   HENT:   Head: Normocephalic and atraumatic.   Eyes: EOM are normal. Pupils are equal, round, and reactive to light.   Neck: Neck supple.   Normal range of motion.  Cardiovascular: Normal rate and regular rhythm.   Pulmonary/Chest: Breath sounds normal. No respiratory distress. She has no wheezes. She has no rhonchi.   Abdominal: Abdomen is soft.   Musculoskeletal:         General: No tenderness or edema.      Cervical back: Normal range of motion and neck supple.     Neurological: She is alert.   Patient moves all extremities.  Patient nonverbal at baseline.   Skin: Skin is warm and dry. Capillary refill takes less than 2 seconds.         Medical Screening Exam   See Full Note    ED Course   Procedures  Labs Reviewed   URINALYSIS, REFLEX TO URINE CULTURE - Abnormal; Notable for the following components:       Result Value    Nitrites, UA Positive (*)     Protein, UA 10  (*)     All other components within normal limits   CBC WITH DIFFERENTIAL - Abnormal; Notable for the following components:    RBC 3.82 (*)     Hematocrit 36.5 (*)     MCH 31.9 (*)     Neutrophils % 70.8 (*)     Lymphocytes % 20.2 (*)     Monocytes % 6.1 (*)     All other components within normal limits   URINALYSIS, MICROSCOPIC - Abnormal; Notable for the following components:    Bacteria, UA Loaded (*)     Squamous Epithelial Cells, UA Occasional (*)     Amorphous Crystals, UA Few (*)     All other components within normal limits   MAGNESIUM - Normal   CULTURE, URINE   CBC W/ AUTO DIFFERENTIAL    Narrative:     The following orders were created for panel order CBC auto differential.  Procedure                               Abnormality         Status                     ---------                               -----------         ------                     CBC with Differential[[id number]]         Abnormal            Final result                 Please view results for these tests on the individual orders.   COMPREHENSIVE METABOLIC PANEL        ECG Results          EKG 12-lead (In process)  Result time 08/19/22 16:30:27    In process by Interface, Lab In [facility] (08/19/22 16:30:27)                 Narrative:    Test Reason : R56.9,    Vent. Rate : 083 BPM     Atrial Rate : 000 BPM     P-R Int : 170 ms          QRS Dur : 084 ms      QT Int : 378 ms       P-R-T Axes : 045 -46 036 degrees     QTc Int : 418 ms    Sinus rhythm  Left anterior fascicular block  rSr'(V1) - probable normal variant  Possible anterior infarct - age undetermined  Abnormal ECG      Referred By:             Confirmed By:                             Imaging Results          X-Ray Chest 1 View (Final result)  Result time 08/19/22 15:42:20    Final result by Francisco Goldsmith II, MD (08/19/22 15:42:20)                 Impression:      No acute cardiopulmonary findings.      Electronically signed by: Francisco Goldsmith  Date:    08/19/2022  Time:    15:42             Narrative:    EXAMINATION:  XR CHEST 1 VIEW    CLINICAL HISTORY:  Unspecified convulsions    COMPARISON:  3 December 2018    FINDINGS:  The heart and mediastinum are stable in size and configuration.  Small hiatal hernia present the pulmonary vascularity is normal in caliber.  No lung infiltrates, effusions, pneumothorax or other abnormality is demonstrated.                               CT Head Without Contrast (Final result)  Result time 08/19/22 15:42:00    Final result by Stiven Mccallum MD (08/19/22 15:42:00)                 Impression:      No acute intracranial process      Electronically signed by: Stiven Mccallum  Date:    08/19/2022  Time:    15:42             Narrative:    EXAMINATION:  CT head without contrast    CLINICAL HISTORY:  seizure;    TECHNIQUE:  Transaxial CT sections were obtained through the brain without contrast.    The CT examination was performed  using one or more of the following dose reduction techniques: Automated exposure control, adjustment of the mA and kV according to patient's size, use of acute or iterative reconstruction techniques.    COMPARISON:  August 8, 2022    FINDINGS:  The ventricles are midline in position without evidence of hydrocephalus. There is no mass or area of parenchymal hemorrhage. There is no gross CT evidence of acute cortical stroke. There is no extra-axial hematoma. The partially visualized sinuses are generally clear. There is no obvious skull fracture.                                 Medications   levETIRAcetam injection 1,000 mg (1,000 mg Intravenous Given 8/19/22 1543)   cefTRIAXone (ROCEPHIN) 1 g in dextrose 5 % in water (D5W) 5 % 50 mL IVPB (MB+) (0 g Intravenous Stopped 8/19/22 1830)                       Clinical Impression:   Final diagnoses:  [R56.9] Seizure  [G40.909] Seizure disorder (Primary)  [N39.0] Urinary tract infection without hematuria, site unspecified          ED Disposition Condition    Discharge Stable        ED Prescriptions     Medication Sig Dispense Start Date End Date Auth. Provider    levetiracetam 500 mg/5 mL (5 mL) Soln Take 10 mLs (1,000 mg total) by mouth 2 (two) times daily. 600 mL 8/19/2022 9/18/2022 Dann Barbosa MD    sulfamethoxazole-trimethoprim 200-40 mg/5 ml (BACTRIM,SEPTRA) 200-40 mg/5 mL Susp Take 20 mLs by mouth every 12 (twelve) hours. for 5 days 200 mL 8/19/2022 8/24/2022 Dann Barbosa MD        Follow-up Information     Follow up With Specialties Details Why Contact Info    [facility] - Emergency Department Emergency Medicine  If symptoms worsen [address] [place] [id number] [phone]           Dann Barbosa MD  08/19/22 2124

## 2022-08-21 LAB — UA COMPLETE W REFLEX CULTURE PNL UR: ABNORMAL

## 2022-08-30 ENCOUNTER — OFFICE VISIT (OUTPATIENT)
Dept: SURGERY | Facility: CLINIC | Age: 67
End: 2022-08-30
Payer: MEDICARE

## 2022-08-30 DIAGNOSIS — E04.1 THYROID NODULE: Primary | ICD-10-CM

## 2022-08-30 PROCEDURE — 99204 PR OFFICE/OUTPT VISIT, NEW, LEVL IV, 45-59 MIN: ICD-10-PCS | Mod: S$PBB,,, | Performed by: STUDENT IN AN ORGANIZED HEALTH CARE EDUCATION/TRAINING PROGRAM

## 2022-08-30 PROCEDURE — 99204 OFFICE O/P NEW MOD 45 MIN: CPT | Mod: S$PBB,,, | Performed by: STUDENT IN AN ORGANIZED HEALTH CARE EDUCATION/TRAINING PROGRAM

## 2022-08-30 PROCEDURE — 99213 OFFICE O/P EST LOW 20 MIN: CPT | Mod: PBBFAC | Performed by: STUDENT IN AN ORGANIZED HEALTH CARE EDUCATION/TRAINING PROGRAM

## 2022-08-30 NOTE — PROGRESS NOTES
History & Physical    SUBJECTIVE:     History of Present Illness:  66-year-old  female presents the clinic for evaluation for thyroid nodules.  Significant medical history for dementia, schizophrenia, seizures; patient currently living in assisted living facility and unable to get review of systems due to dementia.  I spoke with the patient's next of kin, sister Allie Hinkle, and she gave me a more accurate history.  Patient has been having increased weight loss and the sister has noticed that her eyes have began to seemed to protrude and be more prominent.  Her sister voiced her concern with her primary care physician and a thyroid ultrasound was performed which showed a 3 cm mixed cystic and solid nodule in the left thyroid lobe consistent with TI-RADS 4.  TSH unremarkable    Chief Complaint   Patient presents with    Thyroid Problem     nodules       Review of patient's allergies indicates:   Allergen Reactions    Ativan [lorazepam]     Prolixin [fluphenazine hcl]        Current Outpatient Medications   Medication Sig Dispense Refill    acetaminophen (TYLENOL) 500 MG tablet Take 500 mg by mouth every 6 (six) hours as needed for Pain.      acetaminophen-codeine 300-30mg (TYLENOL #3) 300-30 mg Tab       amoxicillin (AMOXIL) 250 MG capsule       cran/vitC/mannose/FOS/bromeln (UTI-STAT ORAL) Take 30 mLs by mouth 2 (two) times daily.      docusate sodium (COLACE) 100 MG capsule Take 100 mg by mouth 2 (two) times daily.      DULCOLAX, BISACODYL, ORAL Take 2 tablets by mouth. 2 tabs 3x week m/w/f      esomeprazole (NEXIUM) 40 MG capsule       folic acid (FOLVITE) 1 MG tablet Take 1 mg by mouth once daily.      INVEGA SUSTENNA 117 mg/0.75 mL Syrg injection Inject 117 mg into the muscle every 30 days.      lactulose 10 gram/15 ml (CHRONULAC) 10 gram/15 mL (15 mL) solution Take 60 mLs by mouth 4 (four) times daily. 60 cc po qid      levetiracetam 500 mg/5 mL (5 mL) Soln Take 10 mLs (1,000 mg total) by mouth 2 (two)  times daily. 600 mL 1    multivitamin with minerals tablet Take 1 tablet by mouth once daily.      omeprazole (PRILOSEC) 40 MG capsule Take 40 mg by mouth once daily.      pantoprazole (PROTONIX) 40 MG tablet Take 1 tablet (40 mg total) by mouth once daily. (Patient not taking: No sig reported) 30 tablet 11     No current facility-administered medications for this visit.       Past Medical History:   Diagnosis Date    Constipation     Gastroesophageal reflux disease with esophagitis without hemorrhage 9/26/2021    GERD (gastroesophageal reflux disease)     Hematemesis with nausea 9/26/2021    Hyperlipidemia     Memory loss     Schizophrenia     Seizures     Ulcer of esophagus without bleeding 9/26/2021     Past Surgical History:   Procedure Laterality Date    EXTRACTION OF TOOTH N/A 4/25/2022    Procedure: EXTRACTION, TOOTH;  Surgeon: Rob Farris DMD, MD;  Location: [facility];  Service: Oral Surgery;  Laterality: N/A;     Family History   Family history unknown: Yes     Social History     Tobacco Use    Smoking status: Never    Smokeless tobacco: Never   Substance Use Topics    Alcohol use: Not Currently    Drug use: Never        Review of Systems:  Review of Systems   Unable to perform ROS: Dementia     OBJECTIVE:     Vital Signs (Most Recent)              Physical Exam:  Physical Exam  Constitutional:       General: She is not in acute distress.     Appearance: Normal appearance.   HENT:      Head: Normocephalic and atraumatic.      Nose: Nose normal.   Eyes:      General: No scleral icterus.     Pupils: Pupils are equal, round, and reactive to light.   Neck:      Thyroid: Thyroid mass present. No thyromegaly or thyroid tenderness.        Comments: Palpable firm nodule on the left thyroid, nontender to palpation  Cardiovascular:      Rate and Rhythm: Normal rate.   Pulmonary:      Effort: Pulmonary effort is normal. No respiratory distress.      Breath sounds: Normal breath sounds.   Abdominal:       General: There is no distension.      Palpations: Abdomen is soft.      Tenderness: There is no abdominal tenderness. There is no guarding.   Musculoskeletal:      Cervical back: Normal range of motion and neck supple.   Skin:     General: Skin is warm.      Coloration: Skin is not jaundiced.   Neurological:      General: No focal deficit present.      Mental Status: She is alert. Mental status is at baseline.      Cranial Nerves: No cranial nerve deficit.   Psychiatric:         Mood and Affect: Mood normal.       ASSESSMENT/PLAN:     Thyroid nodules    PLAN:Plan     We will attempt to set up for an FNA of the left thyroid nodule.  Family states patient may require sedation; we will leave that up to Interventional Radiology.      The biopsy will help us determine a diagnosis; I spoke in great detail with the family and they do not wish any significant surgical procedure to be done.  I will contact them once I have the biopsy results and discuss any further care with them at that time.

## 2022-09-07 ENCOUNTER — HOSPITAL ENCOUNTER (OUTPATIENT)
Dept: RADIOLOGY | Facility: HOSPITAL | Age: 67
Discharge: HOME | End: 2022-09-07
Attending: STUDENT IN AN ORGANIZED HEALTH CARE EDUCATION/TRAINING PROGRAM
Payer: MEDICARE

## 2022-09-07 VITALS
OXYGEN SATURATION: 97 % | RESPIRATION RATE: 20 BRPM | HEART RATE: 78 BPM | DIASTOLIC BLOOD PRESSURE: 64 MMHG | SYSTOLIC BLOOD PRESSURE: 95 MMHG

## 2022-09-07 DIAGNOSIS — E04.1 THYROID NODULE: ICD-10-CM

## 2022-09-07 PROCEDURE — 10005 FNA BX W/US GDN 1ST LES: CPT | Mod: ,,, | Performed by: RADIOLOGY

## 2022-09-07 PROCEDURE — 10005 FNA BX W/US GDN 1ST LES: CPT

## 2022-09-07 PROCEDURE — 10005 US FINE NEEDLE ASPIRATION BIOPSY, FIRST LESION: ICD-10-PCS | Mod: ,,, | Performed by: RADIOLOGY

## 2022-09-07 NOTE — PROGRESS NOTES
Procedure performed by Dr. Terrazas and assisted by Henrry GALVEZ.  Patient presents for U/S guided FNA. Informed consent obtained. Patient prepped with chlora prep and draped in a sterile manor.  Utilizing U/S guidance 2 - 22 gauge FNAs were acquired and sent to the lab for analysis. Pressure held on puncture site and bandage applied. Patient tolerated procedure well with no immediate complications.

## 2022-09-07 NOTE — PROGRESS NOTES
IR requested to perform U/S guided fna of thyoid nodule. H and P reviewed. Informed consent obtained.

## 2022-09-08 LAB
ESTROGEN SERPL-MCNC: NORMAL PG/ML
LAB AP CLINICAL INFORMATION: NORMAL
LAB AP COMMENTS: NORMAL
LAB AP GROSS DESCRIPTION: NORMAL
T3RU NFR SERPL: NORMAL %
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
## 2022-09-13 ENCOUNTER — OFFICE VISIT (OUTPATIENT)
Dept: NEUROLOGY | Facility: CLINIC | Age: 67
End: 2022-09-13
Payer: MEDICARE

## 2022-09-13 VITALS
SYSTOLIC BLOOD PRESSURE: 112 MMHG | HEIGHT: 67 IN | HEART RATE: 83 BPM | BODY MASS INDEX: 16.79 KG/M2 | OXYGEN SATURATION: 98 % | DIASTOLIC BLOOD PRESSURE: 70 MMHG | WEIGHT: 107 LBS

## 2022-09-13 DIAGNOSIS — Z79.899 ON LONG TERM DRUG THERAPY: Chronic | ICD-10-CM

## 2022-09-13 DIAGNOSIS — R26.89 BALANCE PROBLEM: Chronic | ICD-10-CM

## 2022-09-13 DIAGNOSIS — R56.9 CONVULSIONS, UNSPECIFIED CONVULSION TYPE: Primary | Chronic | ICD-10-CM

## 2022-09-13 PROCEDURE — 99215 OFFICE O/P EST HI 40 MIN: CPT | Mod: PBBFAC | Performed by: NURSE PRACTITIONER

## 2022-09-13 PROCEDURE — 99214 OFFICE O/P EST MOD 30 MIN: CPT | Mod: S$PBB,,, | Performed by: NURSE PRACTITIONER

## 2022-09-13 PROCEDURE — 99214 PR OFFICE/OUTPT VISIT, EST, LEVL IV, 30-39 MIN: ICD-10-PCS | Mod: S$PBB,,, | Performed by: NURSE PRACTITIONER

## 2022-09-13 RX ORDER — CIPROFLOXACIN 500 MG/1
TABLET ORAL
COMMUNITY
Start: 2022-08-30 | End: 2022-12-13

## 2022-09-13 RX ORDER — FLUCONAZOLE 100 MG/1
TABLET ORAL
COMMUNITY
Start: 2022-08-30 | End: 2022-12-13

## 2022-09-20 ENCOUNTER — TELEPHONE (OUTPATIENT)
Dept: SURGERY | Facility: CLINIC | Age: 67
End: 2022-09-20
Payer: MEDICARE

## 2022-09-20 NOTE — TELEPHONE ENCOUNTER
----- Message from Mary Matthew sent at 9/20/2022 10:59 AM CDT -----  Call NP Florence Carnes @ [facility] ,she said patient you did  a biopsy on her when did you want to follow up with patient. Please call Juanita Henriquez @ [phone]      Spoke with one of the nurses at [facility], florence carnes NP was unavailable. Notified that Dr. Wright spoke with the patient's sister, POA, and discussed results. They do not want to proceed with any treatment at this time. They understand the risks and benefits.

## 2022-12-12 ENCOUNTER — HOSPITAL ENCOUNTER (INPATIENT)
Facility: HOSPITAL | Age: 67
LOS: 6 days | Discharge: HOSPICE | DRG: 381 | End: 2022-12-19
Attending: EMERGENCY MEDICINE | Admitting: INTERNAL MEDICINE
Payer: MEDICARE

## 2022-12-12 DIAGNOSIS — K92.2 UPPER GI BLEED: Primary | ICD-10-CM

## 2022-12-12 DIAGNOSIS — K92.2 GASTROINTESTINAL HEMORRHAGE, UNSPECIFIED GASTROINTESTINAL HEMORRHAGE TYPE: ICD-10-CM

## 2022-12-12 DIAGNOSIS — Z51.5 COMFORT MEASURES ONLY STATUS: ICD-10-CM

## 2022-12-12 LAB
ALBUMIN SERPL BCP-MCNC: 3.8 G/DL (ref 3.5–5)
ALBUMIN/GLOB SERPL: 1.2 {RATIO}
ALP SERPL-CCNC: 102 U/L (ref 55–142)
ALT SERPL W P-5'-P-CCNC: 17 U/L (ref 13–56)
AMMONIA PLAS-SCNC: <10 ΜMOL/L (ref 11–32)
ANION GAP SERPL CALCULATED.3IONS-SCNC: 12 MMOL/L (ref 7–16)
AST SERPL W P-5'-P-CCNC: 17 U/L (ref 15–37)
BACTERIA #/AREA URNS HPF: ABNORMAL /HPF
BASOPHILS # BLD AUTO: 0.03 K/UL (ref 0–0.2)
BASOPHILS NFR BLD AUTO: 0.3 % (ref 0–1)
BILIRUB SERPL-MCNC: 0.7 MG/DL (ref ?–1.2)
BILIRUB UR QL STRIP: NEGATIVE
BUN SERPL-MCNC: 12 MG/DL (ref 7–18)
BUN/CREAT SERPL: 15 (ref 6–20)
CALCIUM SERPL-MCNC: 9.3 MG/DL (ref 8.5–10.1)
CHLORIDE SERPL-SCNC: 104 MMOL/L (ref 98–107)
CLARITY UR: ABNORMAL
CO2 SERPL-SCNC: 29 MMOL/L (ref 21–32)
COLOR UR: YELLOW
CREAT SERPL-MCNC: 0.79 MG/DL (ref 0.55–1.02)
DIFFERENTIAL METHOD BLD: ABNORMAL
EGFR (NO RACE VARIABLE) (RUSH/TITUS): 81 ML/MIN/1.73M²
EOSINOPHIL # BLD AUTO: 0.11 K/UL (ref 0–0.5)
EOSINOPHIL NFR BLD AUTO: 1 % (ref 1–4)
ERYTHROCYTE [DISTWIDTH] IN BLOOD BY AUTOMATED COUNT: 13.4 % (ref 11.5–14.5)
GLOBULIN SER-MCNC: 3.2 G/DL (ref 2–4)
GLUCOSE SERPL-MCNC: 134 MG/DL (ref 74–106)
GLUCOSE UR STRIP-MCNC: NORMAL MG/DL
HCT VFR BLD AUTO: 42.8 % (ref 38–47)
HGB BLD-MCNC: 14 G/DL (ref 12–16)
IMM GRANULOCYTES # BLD AUTO: 0.03 K/UL (ref 0–0.04)
IMM GRANULOCYTES NFR BLD: 0.3 % (ref 0–0.4)
KETONES UR STRIP-SCNC: NEGATIVE MG/DL
LEUKOCYTE ESTERASE UR QL STRIP: ABNORMAL
LYMPHOCYTES # BLD AUTO: 1.13 K/UL (ref 1–4.8)
LYMPHOCYTES NFR BLD AUTO: 9.9 % (ref 27–41)
MCH RBC QN AUTO: 31.6 PG (ref 27–31)
MCHC RBC AUTO-ENTMCNC: 32.7 G/DL (ref 32–36)
MCV RBC AUTO: 96.6 FL (ref 80–96)
MONOCYTES # BLD AUTO: 0.7 K/UL (ref 0–0.8)
MONOCYTES NFR BLD AUTO: 6.2 % (ref 2–6)
MPC BLD CALC-MCNC: 10 FL (ref 9.4–12.4)
MUCOUS THREADS #/AREA URNS HPF: ABNORMAL /HPF
NEUTROPHILS # BLD AUTO: 9.38 K/UL (ref 1.8–7.7)
NEUTROPHILS NFR BLD AUTO: 82.3 % (ref 53–65)
NITRITE UR QL STRIP: NEGATIVE
NRBC # BLD AUTO: 0 X10E3/UL
NRBC, AUTO (.00): 0 %
PH UR STRIP: 7 PH UNITS
PLATELET # BLD AUTO: 209 K/UL (ref 150–400)
POC OCCULT BLOOD STOOL: NEGATIVE
POTASSIUM SERPL-SCNC: 3.8 MMOL/L (ref 3.5–5.1)
PROT SERPL-MCNC: 7 G/DL (ref 6.4–8.2)
PROT UR QL STRIP: 50
RBC # BLD AUTO: 4.43 M/UL (ref 4.2–5.4)
RBC # UR STRIP: NEGATIVE /UL
RBC #/AREA URNS HPF: ABNORMAL /[HPF]
SODIUM SERPL-SCNC: 141 MMOL/L (ref 136–145)
SP GR UR STRIP: 1.03
SQUAMOUS #/AREA URNS LPF: ABNORMAL /LPF
UROBILINOGEN UR STRIP-ACNC: 3 MG/DL
WBC # BLD AUTO: 11.38 K/UL (ref 4.5–11)
WBC #/AREA URNS HPF: ABNORMAL /HPF

## 2022-12-12 PROCEDURE — 87428 SARSCOV & INF VIR A&B AG IA: CPT | Performed by: NURSE PRACTITIONER

## 2022-12-12 PROCEDURE — 96360 HYDRATION IV INFUSION INIT: CPT

## 2022-12-12 PROCEDURE — 82140 ASSAY OF AMMONIA: CPT | Performed by: NURSE PRACTITIONER

## 2022-12-12 PROCEDURE — 81001 URINALYSIS AUTO W/SCOPE: CPT | Performed by: NURSE PRACTITIONER

## 2022-12-12 PROCEDURE — 82272 OCCULT BLD FECES 1-3 TESTS: CPT

## 2022-12-12 PROCEDURE — 85025 COMPLETE CBC W/AUTO DIFF WBC: CPT | Performed by: NURSE PRACTITIONER

## 2022-12-12 PROCEDURE — 25000003 PHARM REV CODE 250: Performed by: NURSE PRACTITIONER

## 2022-12-12 PROCEDURE — 36415 COLL VENOUS BLD VENIPUNCTURE: CPT | Performed by: NURSE PRACTITIONER

## 2022-12-12 PROCEDURE — 99283 PR EMERGENCY DEPT VISIT,LEVEL III: ICD-10-PCS | Mod: GW,CS,, | Performed by: NURSE PRACTITIONER

## 2022-12-12 PROCEDURE — 99285 EMERGENCY DEPT VISIT HI MDM: CPT

## 2022-12-12 PROCEDURE — 81003 URINALYSIS AUTO W/O SCOPE: CPT | Performed by: NURSE PRACTITIONER

## 2022-12-12 PROCEDURE — 87040 BLOOD CULTURE FOR BACTERIA: CPT | Performed by: NURSE PRACTITIONER

## 2022-12-12 PROCEDURE — 99283 EMERGENCY DEPT VISIT LOW MDM: CPT | Mod: GW,CS,, | Performed by: NURSE PRACTITIONER

## 2022-12-12 PROCEDURE — 80053 COMPREHEN METABOLIC PANEL: CPT | Performed by: NURSE PRACTITIONER

## 2022-12-12 PROCEDURE — 96361 HYDRATE IV INFUSION ADD-ON: CPT

## 2022-12-12 RX ADMIN — SODIUM CHLORIDE 1000 ML: 9 INJECTION, SOLUTION INTRAVENOUS at 11:12

## 2022-12-13 PROBLEM — K59.00 CONSTIPATION: Status: ACTIVE | Noted: 2022-12-13

## 2022-12-13 PROBLEM — K59.00 CONSTIPATION: Status: RESOLVED | Noted: 2022-12-13 | Resolved: 2022-12-13

## 2022-12-13 PROBLEM — R41.82 ALTERED MENTAL STATE: Status: ACTIVE | Noted: 2022-12-13

## 2022-12-13 PROBLEM — F20.9 SCHIZOPHRENIA, UNSPECIFIED: Status: ACTIVE | Noted: 2022-12-13

## 2022-12-13 LAB
APTT PPP: 20 SECONDS (ref 25.2–37.3)
FLUAV AG UPPER RESP QL IA.RAPID: NEGATIVE
FLUBV AG UPPER RESP QL IA.RAPID: NEGATIVE
HCO3 UR-SCNC: 23.6 MMOL/L (ref 21–28)
HCT VFR BLD AUTO: 38.9 % (ref 38–47)
HCT VFR BLD CALC: 32 % (ref 35–51)
HGB BLD-MCNC: 11.7 G/DL (ref 12–16)
HGB BLD-MCNC: 11.9 G/DL (ref 12–16)
HGB BLD-MCNC: 12.1 G/DL (ref 12–16)
HGB BLD-MCNC: 12.2 G/DL (ref 12–16)
INDIRECT COOMBS: NORMAL
INR BLD: 1.14
LDH SERPL L TO P-CCNC: 0.6 MMOL/L (ref 0.3–1.2)
LEVETIRACETAM SERPL-MCNC: 4.8 ΜG/ML (ref 12–46)
PCO2 BLDA: 34 MMHG (ref 35–48)
PH SMN: 7.45 [PH] (ref 7.35–7.45)
PO2 BLDA: 89 MMHG (ref 83–108)
POC BASE EXCESS: -0.1 MMOL/L (ref -2–3)
POC CO2: 24.6 MMOL/L
POC IONIZED CALCIUM: 1.2 MMOL/L (ref 1.15–1.35)
POC SATURATED O2: 97 % (ref 95–98)
POCT GLUCOSE: 79 MG/DL (ref 60–95)
POTASSIUM BLD-SCNC: 3.3 MMOL/L (ref 3.4–4.5)
PROTHROMBIN TIME: 14.2 SECONDS (ref 11.7–14.7)
RH BLD: NORMAL
SARS-COV+SARS-COV-2 AG RESP QL IA.RAPID: NEGATIVE
SODIUM BLD-SCNC: 137 MMOL/L (ref 136–145)
TSH SERPL DL<=0.005 MIU/L-ACNC: 0.66 UIU/ML (ref 0.36–3.74)

## 2022-12-13 PROCEDURE — 25000003 PHARM REV CODE 250: Performed by: INTERNAL MEDICINE

## 2022-12-13 PROCEDURE — 85610 PROTHROMBIN TIME: CPT | Performed by: INTERNAL MEDICINE

## 2022-12-13 PROCEDURE — 83605 ASSAY OF LACTIC ACID: CPT

## 2022-12-13 PROCEDURE — 85014 HEMATOCRIT: CPT

## 2022-12-13 PROCEDURE — 85730 THROMBOPLASTIN TIME PARTIAL: CPT | Performed by: INTERNAL MEDICINE

## 2022-12-13 PROCEDURE — 63600175 PHARM REV CODE 636 W HCPCS: Performed by: INTERNAL MEDICINE

## 2022-12-13 PROCEDURE — 82803 BLOOD GASES ANY COMBINATION: CPT

## 2022-12-13 PROCEDURE — 84443 ASSAY THYROID STIM HORMONE: CPT | Performed by: INTERNAL MEDICINE

## 2022-12-13 PROCEDURE — 99223 1ST HOSP IP/OBS HIGH 75: CPT | Mod: GC,,, | Performed by: FAMILY MEDICINE

## 2022-12-13 PROCEDURE — 85018 HEMOGLOBIN: CPT | Performed by: INTERNAL MEDICINE

## 2022-12-13 PROCEDURE — 84295 ASSAY OF SERUM SODIUM: CPT

## 2022-12-13 PROCEDURE — C9113 INJ PANTOPRAZOLE SODIUM, VIA: HCPCS | Performed by: EMERGENCY MEDICINE

## 2022-12-13 PROCEDURE — 36415 COLL VENOUS BLD VENIPUNCTURE: CPT | Performed by: INTERNAL MEDICINE

## 2022-12-13 PROCEDURE — 63600175 PHARM REV CODE 636 W HCPCS: Performed by: HOSPITALIST

## 2022-12-13 PROCEDURE — 36415 COLL VENOUS BLD VENIPUNCTURE: CPT | Performed by: EMERGENCY MEDICINE

## 2022-12-13 PROCEDURE — 11000001 HC ACUTE MED/SURG PRIVATE ROOM

## 2022-12-13 PROCEDURE — C9113 INJ PANTOPRAZOLE SODIUM, VIA: HCPCS | Performed by: INTERNAL MEDICINE

## 2022-12-13 PROCEDURE — 82947 ASSAY GLUCOSE BLOOD QUANT: CPT

## 2022-12-13 PROCEDURE — 99223 PR INITIAL HOSPITAL CARE,LEVL III: ICD-10-PCS | Mod: GC,,, | Performed by: FAMILY MEDICINE

## 2022-12-13 PROCEDURE — 63600175 PHARM REV CODE 636 W HCPCS: Performed by: EMERGENCY MEDICINE

## 2022-12-13 PROCEDURE — 82330 ASSAY OF CALCIUM: CPT

## 2022-12-13 PROCEDURE — 85018 HEMOGLOBIN: CPT | Performed by: EMERGENCY MEDICINE

## 2022-12-13 PROCEDURE — 85014 HEMATOCRIT: CPT | Performed by: EMERGENCY MEDICINE

## 2022-12-13 PROCEDURE — 80177 DRUG SCRN QUAN LEVETIRACETAM: CPT | Performed by: INTERNAL MEDICINE

## 2022-12-13 PROCEDURE — 84132 ASSAY OF SERUM POTASSIUM: CPT

## 2022-12-13 PROCEDURE — 86901 BLOOD TYPING SEROLOGIC RH(D): CPT | Performed by: INTERNAL MEDICINE

## 2022-12-13 RX ORDER — LEVETIRACETAM 500 MG/5ML
1000 INJECTION, SOLUTION, CONCENTRATE INTRAVENOUS EVERY 12 HOURS
Status: DISCONTINUED | OUTPATIENT
Start: 2022-12-13 | End: 2022-12-13

## 2022-12-13 RX ORDER — LEVETIRACETAM 500 MG/5ML
500 INJECTION, SOLUTION, CONCENTRATE INTRAVENOUS EVERY 12 HOURS
Status: DISCONTINUED | OUTPATIENT
Start: 2022-12-13 | End: 2022-12-19 | Stop reason: HOSPADM

## 2022-12-13 RX ORDER — ACETAMINOPHEN 325 MG/1
650 TABLET ORAL EVERY 6 HOURS PRN
Status: DISCONTINUED | OUTPATIENT
Start: 2022-12-13 | End: 2022-12-19 | Stop reason: HOSPADM

## 2022-12-13 RX ORDER — PANTOPRAZOLE SODIUM 40 MG/1
40 TABLET, DELAYED RELEASE ORAL
Status: DISCONTINUED | OUTPATIENT
Start: 2022-12-13 | End: 2022-12-13

## 2022-12-13 RX ORDER — SODIUM CHLORIDE, SODIUM LACTATE, POTASSIUM CHLORIDE, CALCIUM CHLORIDE 600; 310; 30; 20 MG/100ML; MG/100ML; MG/100ML; MG/100ML
INJECTION, SOLUTION INTRAVENOUS CONTINUOUS
Status: DISCONTINUED | OUTPATIENT
Start: 2022-12-13 | End: 2022-12-14

## 2022-12-13 RX ORDER — DOCUSATE SODIUM 100 MG/1
100 CAPSULE, LIQUID FILLED ORAL 2 TIMES DAILY
Status: DISCONTINUED | OUTPATIENT
Start: 2022-12-13 | End: 2022-12-13

## 2022-12-13 RX ORDER — ATORVASTATIN CALCIUM 10 MG/1
10 TABLET, FILM COATED ORAL NIGHTLY
Status: DISCONTINUED | OUTPATIENT
Start: 2022-12-13 | End: 2022-12-13

## 2022-12-13 RX ORDER — LEVETIRACETAM 100 MG/ML
1000 SOLUTION ORAL 2 TIMES DAILY
Status: DISCONTINUED | OUTPATIENT
Start: 2022-12-13 | End: 2022-12-13

## 2022-12-13 RX ORDER — FOLIC ACID 1 MG/1
1 TABLET ORAL DAILY
Status: DISCONTINUED | OUTPATIENT
Start: 2022-12-13 | End: 2022-12-13

## 2022-12-13 RX ORDER — ONDANSETRON 2 MG/ML
4 INJECTION INTRAMUSCULAR; INTRAVENOUS EVERY 8 HOURS PRN
Status: DISCONTINUED | OUTPATIENT
Start: 2022-12-13 | End: 2022-12-19 | Stop reason: HOSPADM

## 2022-12-13 RX ORDER — PANTOPRAZOLE SODIUM 40 MG/10ML
80 INJECTION, POWDER, LYOPHILIZED, FOR SOLUTION INTRAVENOUS
Status: COMPLETED | OUTPATIENT
Start: 2022-12-13 | End: 2022-12-13

## 2022-12-13 RX ADMIN — FOLIC ACID 1 MG: 1 TABLET ORAL at 12:12

## 2022-12-13 RX ADMIN — SODIUM CHLORIDE, POTASSIUM CHLORIDE, SODIUM LACTATE AND CALCIUM CHLORIDE 1000 ML: 600; 310; 30; 20 INJECTION, SOLUTION INTRAVENOUS at 03:12

## 2022-12-13 RX ADMIN — SODIUM CHLORIDE, POTASSIUM CHLORIDE, SODIUM LACTATE AND CALCIUM CHLORIDE: 600; 310; 30; 20 INJECTION, SOLUTION INTRAVENOUS at 06:12

## 2022-12-13 RX ADMIN — LEVETIRACETAM 1000 MG: 500 SOLUTION ORAL at 12:12

## 2022-12-13 RX ADMIN — SODIUM CHLORIDE, POTASSIUM CHLORIDE, SODIUM LACTATE AND CALCIUM CHLORIDE: 600; 310; 30; 20 INJECTION, SOLUTION INTRAVENOUS at 03:12

## 2022-12-13 RX ADMIN — LEVETIRACETAM 500 MG: 100 INJECTION, SOLUTION INTRAVENOUS at 10:12

## 2022-12-13 RX ADMIN — THERA TABS 1 TABLET: TAB at 12:12

## 2022-12-13 RX ADMIN — PANTOPRAZOLE SODIUM 80 MG: 40 INJECTION, POWDER, FOR SOLUTION INTRAVENOUS at 05:12

## 2022-12-13 RX ADMIN — PANTOPRAZOLE SODIUM 8 MG/HR: 40 INJECTION, POWDER, LYOPHILIZED, FOR SOLUTION INTRAVENOUS at 10:12

## 2022-12-13 RX ADMIN — PANTOPRAZOLE SODIUM 8 MG/HR: 40 INJECTION, POWDER, LYOPHILIZED, FOR SOLUTION INTRAVENOUS at 06:12

## 2022-12-13 NOTE — SUBJECTIVE & OBJECTIVE
Past Medical History:   Diagnosis Date    Constipation     Gastroesophageal reflux disease with esophagitis without hemorrhage 9/26/2021    GERD (gastroesophageal reflux disease)     Hematemesis with nausea 9/26/2021    Hyperlipidemia     Memory loss     Schizophrenia     Seizures     Ulcer of esophagus without bleeding 9/26/2021       Past Surgical History:   Procedure Laterality Date    EXTRACTION OF TOOTH N/A 4/25/2022    Procedure: EXTRACTION, TOOTH;  Surgeon: Rob Farris DMD, MD;  Location: [facility];  Service: Oral Surgery;  Laterality: N/A;       Review of patient's allergies indicates:   Allergen Reactions    Ativan [lorazepam]     Prolixin [fluphenazine hcl]        No current facility-administered medications on file prior to encounter.     Current Outpatient Medications on File Prior to Encounter   Medication Sig    acetaminophen (TYLENOL) 500 MG tablet Take 500 mg by mouth every 6 (six) hours as needed for Pain.    acetaminophen-codeine 300-30mg (TYLENOL #3) 300-30 mg Tab     amoxicillin (AMOXIL) 250 MG capsule     ciprofloxacin HCl (CIPRO) 500 MG tablet     cran/vitC/mannose/FOS/bromeln (UTI-STAT ORAL) Take 30 mLs by mouth 2 (two) times daily.    docusate sodium (COLACE) 100 MG capsule Take 100 mg by mouth 2 (two) times daily.    DULCOLAX, BISACODYL, ORAL Take 2 tablets by mouth. 2 tabs 3x week m/w/f    esomeprazole (NEXIUM) 40 MG capsule     fluconazole (DIFLUCAN) 100 MG tablet     folic acid (FOLVITE) 1 MG tablet Take 1 mg by mouth once daily.    INVEGA SUSTENNA 117 mg/0.75 mL Syrg injection Inject 117 mg into the muscle every 30 days.    lactulose 10 gram/15 ml (CHRONULAC) 10 gram/15 mL (15 mL) solution Take 60 mLs by mouth 4 (four) times daily. 60 cc po qid    levetiracetam 500 mg/5 mL (5 mL) Soln Take 10 mLs (1,000 mg total) by mouth 2 (two) times daily.    multivitamin with minerals tablet Take 1 tablet by mouth once daily.    omeprazole (PRILOSEC) 40 MG capsule Take 40 mg by mouth once  daily.    pantoprazole (PROTONIX) 40 MG tablet Take 1 tablet (40 mg total) by mouth once daily. (Patient not taking: No sig reported)     Family History       Family history is unknown by patient.          Tobacco Use    Smoking status: Never    Smokeless tobacco: Never   Substance and Sexual Activity    Alcohol use: Not Currently    Drug use: Never    Sexual activity: Not on file     Review of Systems   Reason unable to perform ROS: Altered mental status.   Objective:     Vital Signs (Most Recent):  Temp: 99.4 °F (37.4 °C) (12/12/22 2218)  Pulse: 74 (12/13/22 0558)  Resp: 16 (12/12/22 2214)  BP: 126/63 (12/13/22 0558)  SpO2: 96 % (12/13/22 0558) Vital Signs (24h Range):  Temp:  [99.4 °F (37.4 °C)] 99.4 °F (37.4 °C)  Pulse:  [69-94] 74  Resp:  [16] 16  SpO2:  [95 %-97 %] 96 %  BP: (109-142)/(52-82) 126/63     Weight: 54.4 kg (120 lb)  Body mass index is 18.79 kg/m².    Physical Exam  Constitutional:       Appearance: She is ill-appearing.   HENT:      Head: Normocephalic and atraumatic.      Right Ear: External ear normal.      Left Ear: External ear normal.      Nose: Nose normal.   Eyes:      General:         Right eye: No discharge.         Left eye: No discharge.      Extraocular Movements: Extraocular movements intact.      Conjunctiva/sclera: Conjunctivae normal.      Pupils: Pupils are equal, round, and reactive to light.   Cardiovascular:      Rate and Rhythm: Normal rate and regular rhythm.   Pulmonary:      Effort: Pulmonary effort is normal.   Abdominal:      General: Abdomen is flat. Bowel sounds are normal.      Palpations: Abdomen is soft.      Tenderness: There is abdominal tenderness.   Skin:     General: Skin is warm and dry.      Coloration: Skin is not jaundiced.      Findings: No bruising or lesion.   Neurological:      Mental Status: She is alert. She is disoriented.   Psychiatric:         Behavior: Behavior is slowed and withdrawn.         Thought Content: Thought content is delusional.          CRANIAL NERVES     CN III, IV, VI   Pupils are equal, round, and reactive to light.     Significant Labs: All pertinent labs within the past 24 hours have been reviewed.    Significant Imaging: I have reviewed all pertinent imaging results/findings within the past 24 hours.

## 2022-12-13 NOTE — ED PROVIDER NOTES
Encounter Date: 12/12/2022       History     Chief Complaint   Patient presents with    Vomiting     Patient presents to ER from [facility].  EMS report patient was sent to ER for coffee ground emesis.  Patient is upright with chin to chest and clothing is saturated in saliva.  Patient is sleeping arouses to stimulation.  Withdraws from pain.  No verbal response.  History is limited.  Patient is DNR per paper work from [facility].       The history is provided by the patient, the EMS personnel and the nursing home. No  was used.   Review of patient's allergies indicates:   Allergen Reactions    Ativan [lorazepam]     Prolixin [fluphenazine hcl]      Past Medical History:   Diagnosis Date    Altered mental state 12/13/2022    Constipation     Gastroesophageal reflux disease with esophagitis without hemorrhage 9/26/2021    GERD (gastroesophageal reflux disease)     Hematemesis with nausea 9/26/2021    Hyperlipidemia     Memory loss     Schizophrenia     Seizures     Ulcer of esophagus without bleeding 9/26/2021     Past Surgical History:   Procedure Laterality Date    EXTRACTION OF TOOTH N/A 4/25/2022    Procedure: EXTRACTION, TOOTH;  Surgeon: Rob Farris DMD, MD;  Location: [facility];  Service: Oral Surgery;  Laterality: N/A;     Family History   Family history unknown: Yes     Social History     Tobacco Use    Smoking status: Never    Smokeless tobacco: Never   Substance Use Topics    Alcohol use: Not Currently    Drug use: Never     Review of Systems   Constitutional:  Positive for activity change.   Gastrointestinal:  Positive for vomiting (coffee ground emesis).   Neurological:  Positive for weakness.   All other systems reviewed and are negative.    Physical Exam     Initial Vitals   BP Pulse Resp Temp SpO2   12/12/22 2214 12/12/22 2214 12/12/22 2214 12/12/22 2218 12/12/22 2214   129/82 91 16 99.4 °F (37.4 °C) 96 %      MAP       --                Physical Exam    Nursing note  and vitals reviewed.  Constitutional: She appears well-developed. She appears lethargic.   HENT:   Head: Normocephalic.   Right Ear: External ear normal.   Left Ear: External ear normal.   Nose: Nose normal.   Mouth/Throat: Oropharynx is clear and moist.   Eyes: Conjunctivae are normal. Pupils are equal, round, and reactive to light.   Neck:   Normal range of motion.  Cardiovascular:  Normal rate, regular rhythm, normal heart sounds and intact distal pulses.           Pulmonary/Chest: She has rhonchi.   Abdominal: Abdomen is soft. Bowel sounds are normal.   Musculoskeletal:         General: Normal range of motion.      Cervical back: Normal range of motion.     Neurological: She appears lethargic. She displays atrophy. GCS eye subscore is 3. GCS verbal subscore is 1. GCS motor subscore is 4.   Skin: Skin is warm and dry. Capillary refill takes less than 2 seconds.       Medical Screening Exam   See Full Note    ED Course   Procedures  Labs Reviewed   COMPREHENSIVE METABOLIC PANEL - Abnormal; Notable for the following components:       Result Value    Glucose 134 (*)     All other components within normal limits   URINALYSIS, REFLEX TO URINE CULTURE - Abnormal; Notable for the following components:    Leukocytes, UA Small (*)     Protein, UA 50 (*)     Urobilinogen, UA 3 (*)     Specific Gravity, UA 1.032 (*)     All other components within normal limits   CBC WITH DIFFERENTIAL - Abnormal; Notable for the following components:    WBC 11.38 (*)     MCV 96.6 (*)     MCH 31.6 (*)     Neutrophils % 82.3 (*)     Lymphocytes % 9.9 (*)     Monocytes % 6.2 (*)     Neutrophils, Abs 9.38 (*)     All other components within normal limits   AMMONIA - Abnormal; Notable for the following components:    Ammonia <10 (*)     All other components within normal limits   URINALYSIS, MICROSCOPIC - Abnormal; Notable for the following components:    Bacteria, UA Few (*)     Squamous Epithelial Cells, UA Moderate (*)     Mucus, UA Rare (*)      All other components within normal limits   HEMOGLOBIN AND HEMATOCRIT, BLOOD - Abnormal; Notable for the following components:    Hemoglobin 11.7 (*)     All other components within normal limits   APTT - Abnormal; Notable for the following components:    PTT 20.0 (*)     All other components within normal limits   HEMOGLOBIN - Abnormal; Notable for the following components:    Hemoglobin 11.9 (*)     All other components within normal limits   LEVETIRACETAM  (KEPPRA) LEVEL - Abnormal; Notable for the following components:    Keppra (Levetiracetam) 4.8 (*)     All other components within normal limits   SARS-COV2 (COVID) W/ FLU ANTIGEN - Normal    Narrative:     Negative SARS-CoV results should not be used as the sole basis for treatment or patient management decisions; negative results should be considered in the context of a patient's recent exposures, history and the presene of clinical signs and symptoms consistent with COVID-19.  Negative results should be treated as presumptive and confirmed by molecular assay, if necessary for patient management.   PROTIME-INR - Normal   TSH - Normal   HEMOGLOBIN - Normal   HEMOGLOBIN - Normal   CBC W/ AUTO DIFFERENTIAL    Narrative:     The following orders were created for panel order CBC auto differential.  Procedure                               Abnormality         Status                     ---------                               -----------         ------                     CBC with Differential[[id number]]        Abnormal            Final result                 Please view results for these tests on the individual orders.   EXTRA TUBES    Narrative:     The following orders were created for panel order EXTRA TUBES.  Procedure                               Abnormality         Status                     ---------                               -----------         ------                     Light Blue Top Hold[[id number]]                              In process                  Light Green Top Hold[[id number]]                             In process                 Torres Top Hold[[id number]]                                                                 Please view results for these tests on the individual orders.   LIGHT BLUE TOP HOLD   LIGHT GREEN TOP HOLD   EXTRA TUBES    Narrative:     The following orders were created for panel order EXTRA TUBES.  Procedure                               Abnormality         Status                     ---------                               -----------         ------                     Lavender Top Hold[[id number]]                                In process                   Please view results for these tests on the individual orders.   LAVENDER TOP HOLD   EXTRA TUBES    Narrative:     The following orders were created for panel order EXTRA TUBES.  Procedure                               Abnormality         Status                     ---------                               -----------         ------                     Gold Top Hold[[id number]]                                    In process                   Please view results for these tests on the individual orders.   GOLD TOP HOLD   TYPE & SCREEN   POCT OCCULT BLOOD (STOOL)          Imaging Results              CT Head Without Contrast (Final result)  Result time 12/13/22 08:15:55      Final result by Francisco Goldsmith II, MD (12/13/22 08:15:55)                   Impression:      No evidence of abnormality demonstrated.      Electronically signed by: Francisco Goldsmith  Date:    12/13/2022  Time:    08:15               Narrative:    EXAMINATION:  CT HEAD WITHOUT CONTRAST    CLINICAL HISTORY:  Mental status change, unknown cause;    TECHNIQUE:  Axial CT imaging of the brain is performed without contrast with 3 mm increments.    CT dose reduction technique used - Dose Rite and tube current modulation.    COMPARISON:  19 August 2022    FINDINGS:  No evidence of hemorrhage, mass, mass effect, midline  shift or acute infarct seen.  The brain parenchyma attenuation and differentiation appears within normal limits. The ventricles and cisterns are normal in caliber.  No cranial or skull base abnormality is identified.                                       XR ABDOMEN, ACUTE 2 OR MORE VIEWS WITH CHEST (Final result)  Result time 12/13/22 07:52:04      Final result by Kelvin Samuel DO (12/13/22 07:52:04)                   Impression:      No bowel obstruction.  Moderate colonic stool      Electronically signed by: Kelvin Samuel  Date:    12/13/2022  Time:    07:52               Narrative:    EXAMINATION:  XR ABDOMEN ACUTE 2 OR MORE VIEWS WITH CHEST    CLINICAL HISTORY:  fever, vomiting;    TECHNIQUE:  XR ABDOMEN ACUTE 2 OR MORE VIEWS WITH CHEST    COMPARISON:  8/19/22    FINDINGS:  Lower lobes are clear    There is no bowel obstruction.  No free air.  No renal calculi.    Moderate colonic stool    Degenerative change.                                       Medications   sodium chloride 0.9% bolus 1,000 mL (0 mLs Intravenous Stopped 12/13/22 0103)   lactated ringers bolus 1,000 mL (0 mLs Intravenous Stopped 12/13/22 0507)   pantoprazole injection 80 mg (80 mg Intravenous Given 12/13/22 0538)   OLANZapine injection 5 mg (5 mg Intramuscular Given 12/17/22 1700)                Attending Attestation:     Physician Attestation Statement for NP/PA:   I have conducted a face to face encounter with this patient in addition to the NP/PA, due to NP/PA Request                  Clinical Impression:   Final diagnoses:  [K92.2] Upper GI bleed (Primary)  [K92.2] Gastrointestinal hemorrhage, unspecified gastrointestinal hemorrhage type        ED Disposition Condition    Admit Stable                LYSSA Arriaga  01/06/23 [id number]

## 2022-12-13 NOTE — CONSULTS
[facility] - Emergency Department  Gastroenterology  Consult Note    Patient Name: Suzie Hennessy  MRN: [id number]  Admission Date: 12/12/2022  Hospital Length of Stay: 0 days  Code Status: DNR   Attending Provider: Richard Pelaez MD   Consulting Provider: Jamie Mccallum MD  Primary Care Physician: Primary Doctor No  Principal Problem:GI bleed    Inpatient consult to Gastroenterology  Consult performed by: LYSSA Greco  Consult ordered by: Richard Pelaez MD  Reason for consult: GIB  Assessment/Recommendations: Pt seen and examined. Agree with findings as detailed below. 66 yo female with dementia and previous GI bleeding from GERD/ulcerative esophagitis in 8/22 presents with coffee ground emesis. Hgb is near baseline at 11.9. No sign of bleeding noted since ED presentation. Nurses report she refuses oral meds. This is likely recurrent ulcerative esophagitis related with ? Compliance with PPI med at NH. Continue serial Hgb's, IV PPI and plan EGD tomorrow.      Subjective:     HPI:  Ms. Hennessy presents to the emergency room today with reports of coffee-ground emesis.  Patient is unable to provide any historical information and no family is available at bedside.  There is also limited information available at this time with chart review.  Patient does reportedly have a history of seizure disorder, hyperlipidemia, schizophrenia, GERD, prior upper GI bleed.  Patient reportedly is a resident at a nursing home where she reportedly had onset of coffee-ground emesis.  From there she was brought to the ER for further evaluation.  On admission, she had labs obtained and note she has an H&H of 11/38.  BUN/creatinine ratio 15.  She did have stool for occult blood also tested which was reportedly negative.  Patient has had prior endoscopy done in August of last year by Dr. Menendez in which she had findings of hiatal hernia and esophageal ulceration.  She is currently hemodynamically stable.      Past Medical History:    Diagnosis Date    Constipation     Gastroesophageal reflux disease with esophagitis without hemorrhage 9/26/2021    GERD (gastroesophageal reflux disease)     Hematemesis with nausea 9/26/2021    Hyperlipidemia     Memory loss     Schizophrenia     Seizures     Ulcer of esophagus without bleeding 9/26/2021       Past Surgical History:   Procedure Laterality Date    EXTRACTION OF TOOTH N/A 4/25/2022    Procedure: EXTRACTION, TOOTH;  Surgeon: Rob Farris DMD, MD;  Location: [facility] [place];  Service: Oral Surgery;  Laterality: N/A;       Review of patient's allergies indicates:   Allergen Reactions    Ativan [lorazepam]     Prolixin [fluphenazine hcl]      Family History       Family history is unknown by patient.          Tobacco Use    Smoking status: Never    Smokeless tobacco: Never   Substance and Sexual Activity    Alcohol use: Not Currently    Drug use: Never    Sexual activity: Not on file     Review of Systems   Unable to perform ROS: Mental status change   Objective:     Vital Signs (Most Recent):  Temp: 99.4 °F (37.4 °C) (12/12/22 2218)  Pulse: 83 (12/13/22 1200)  Resp: 17 (12/13/22 1200)  BP: (!) 128/59 (12/13/22 1200)  SpO2: 96 % (12/13/22 1200)   Vital Signs (24h Range):  Temp:  [99.4 °F (37.4 °C)] 99.4 °F (37.4 °C)  Pulse:  [69-94] 83  Resp:  [11-17] 17  SpO2:  [95 %-97 %] 96 %  BP: (105-142)/(52-82) 128/59     Weight: 54.4 kg (120 lb) (12/12/22 2214)  Body mass index is 18.79 kg/m².      Intake/Output Summary (Last 24 hours) at 12/13/2022 1237  Last data filed at 12/13/2022 0507  Gross per 24 hour   Intake 2000 ml   Output --   Net 2000 ml       Lines/Drains/Airways       Peripheral Intravenous Line  Duration                  Peripheral IV - Single Lumen 12/12/22 2253 20 G Right Forearm <1 day                    Physical Exam  Vitals and nursing note reviewed.   Constitutional:       General: She is sleeping. She is not in acute distress.     Appearance: She is underweight. She is not  ill-appearing.   HENT:      Head: Normocephalic.      Nose: Nose normal. No congestion or rhinorrhea.      Mouth/Throat:      Mouth: Mucous membranes are moist.      Pharynx: Oropharynx is clear. No oropharyngeal exudate or posterior oropharyngeal erythema.   Eyes:      General: No scleral icterus.  Cardiovascular:      Rate and Rhythm: Normal rate and regular rhythm.      Pulses: Normal pulses.      Heart sounds: Normal heart sounds. No murmur heard.  Pulmonary:      Effort: Pulmonary effort is normal.      Breath sounds: Normal breath sounds. No stridor. No wheezing, rhonchi or rales.   Abdominal:      General: Abdomen is flat. Bowel sounds are normal. There is no distension.      Palpations: Abdomen is soft. There is no mass.      Tenderness: There is no abdominal tenderness.   Genitourinary:     Rectum: Guaiac result negative.   Musculoskeletal:         General: Normal range of motion.      Right lower leg: No edema.      Left lower leg: No edema.   Skin:     General: Skin is warm and dry.      Coloration: Skin is not jaundiced.   Neurological:      Mental Status: She is lethargic.   Psychiatric:      Comments: Unable to assess       Significant Labs:  All pertinent lab results from the last 24 hours have been reviewed.    Significant Imaging:  Imaging results within the past 24 hours have been reviewed.    Assessment/Plan:     * GI bleed  12/13/2022-patient admitted with reports of coffee-ground emesis from nursing home facility.  H&H is stable.  Labs noted.  Prior EGD approximately a year ago also noted.  Remains hemodynamically stable.  We will continue with Protonix.  Continue to monitor for any further bleeding.  Note heme negative stool.  Will review case further with Dr. Mccallum with plan an addendum to follow.        Thank you for your consult. I will follow-up with patient. Please contact us if you have any additional questions.    Payton Hernandes, LYSSA  Gastroenterology  [facility] -  Emergency Department

## 2022-12-13 NOTE — PT/OT/SLP PROGRESS
Speech Language Pathology      Suzie Hennessy  MRN: [id number]    Patient not seen today secondary to Other (Comment) (Pt refused to participate in swallow eval. She clinched her lips tight and did not allow anything to be placed in her mouth.). Will follow-up 12/14/22.

## 2022-12-13 NOTE — SUBJECTIVE & OBJECTIVE
Past Medical History:   Diagnosis Date    Constipation     Gastroesophageal reflux disease with esophagitis without hemorrhage 9/26/2021    GERD (gastroesophageal reflux disease)     Hematemesis with nausea 9/26/2021    Hyperlipidemia     Memory loss     Schizophrenia     Seizures     Ulcer of esophagus without bleeding 9/26/2021       Past Surgical History:   Procedure Laterality Date    EXTRACTION OF TOOTH N/A 4/25/2022    Procedure: EXTRACTION, TOOTH;  Surgeon: Rob Farris DMD, MD;  Location: [facility], [place];  Service: Oral Surgery;  Laterality: N/A;       Review of patient's allergies indicates:   Allergen Reactions    Ativan [lorazepam]     Prolixin [fluphenazine hcl]      Family History       Family history is unknown by patient.          Tobacco Use    Smoking status: Never    Smokeless tobacco: Never   Substance and Sexual Activity    Alcohol use: Not Currently    Drug use: Never    Sexual activity: Not on file     Review of Systems   Unable to perform ROS: Mental status change   Objective:     Vital Signs (Most Recent):  Temp: 99.4 °F (37.4 °C) (12/12/22 2218)  Pulse: 83 (12/13/22 1200)  Resp: 17 (12/13/22 1200)  BP: (!) 128/59 (12/13/22 1200)  SpO2: 96 % (12/13/22 1200)   Vital Signs (24h Range):  Temp:  [99.4 °F (37.4 °C)] 99.4 °F (37.4 °C)  Pulse:  [69-94] 83  Resp:  [11-17] 17  SpO2:  [95 %-97 %] 96 %  BP: (105-142)/(52-82) 128/59     Weight: 54.4 kg (120 lb) (12/12/22 2214)  Body mass index is 18.79 kg/m².      Intake/Output Summary (Last 24 hours) at 12/13/2022 1237  Last data filed at 12/13/2022 0507  Gross per 24 hour   Intake 2000 ml   Output --   Net 2000 ml       Lines/Drains/Airways       Peripheral Intravenous Line  Duration                  Peripheral IV - Single Lumen 12/12/22 2253 20 G Right Forearm <1 day                    Physical Exam  Vitals and nursing note reviewed.   Constitutional:       General: She is sleeping. She is not in acute distress.     Appearance: She is underweight.  She is not ill-appearing.   HENT:      Head: Normocephalic.      Nose: Nose normal. No congestion or rhinorrhea.      Mouth/Throat:      Mouth: Mucous membranes are moist.      Pharynx: Oropharynx is clear. No oropharyngeal exudate or posterior oropharyngeal erythema.   Eyes:      General: No scleral icterus.  Cardiovascular:      Rate and Rhythm: Normal rate and regular rhythm.      Pulses: Normal pulses.      Heart sounds: Normal heart sounds. No murmur heard.  Pulmonary:      Effort: Pulmonary effort is normal.      Breath sounds: Normal breath sounds. No stridor. No wheezing, rhonchi or rales.   Abdominal:      General: Abdomen is flat. Bowel sounds are normal. There is no distension.      Palpations: Abdomen is soft. There is no mass.      Tenderness: There is no abdominal tenderness.   Genitourinary:     Rectum: Guaiac result negative.   Musculoskeletal:         General: Normal range of motion.      Right lower leg: No edema.      Left lower leg: No edema.   Skin:     General: Skin is warm and dry.      Coloration: Skin is not jaundiced.   Neurological:      Mental Status: She is lethargic.   Psychiatric:      Comments: Unable to assess       Significant Labs:  All pertinent lab results from the last 24 hours have been reviewed.    Significant Imaging:  Imaging results within the past 24 hours have been reviewed.

## 2022-12-13 NOTE — HPI
Ms. Hennessy presents to the emergency room today with reports of coffee-ground emesis.  Patient is unable to provide any historical information and no family is available at bedside.  There is also limited information available at this time with chart review.  Patient does reportedly have a history of seizure disorder, hyperlipidemia, schizophrenia, GERD, prior upper GI bleed.  Patient reportedly is a resident at a nursing home where she reportedly had onset of coffee-ground emesis.  From there she was brought to the ER for further evaluation.  On admission, she had labs obtained and note she has an H&H of 11/38.  BUN/creatinine ratio 15.  She did have stool for occult blood also tested which was reportedly negative.  Patient has had prior endoscopy done in August of last year by Dr. Menendez in which she had findings of hiatal hernia and esophageal ulceration.  She is currently hemodynamically stable.

## 2022-12-13 NOTE — H&P
"[facility] Emergency Department  Hospital Medicine  History & Physical    Patient Name: Suzie Hennessy  MRN: [id number]  Patient Class: IP- Inpatient  Admission Date: 12/12/2022  Attending Physician: Javed Serrato Jr., MD   Primary Care Provider: Primary Doctor No         Patient information was obtained from past medical records and ER records.     Subjective:     Principal Problem:GI bleed    Chief Complaint:   Chief Complaint   Patient presents with    Vomiting        HPI: Pt is a 67 y.o. f . presenting with witnessed "coffee-ground emesis".  Pt cannot provide a hx; she appears to be lethargic and psychotic at the time of my exam and makes references to her grandmother and Kendrick "walking on the land and the water". She also reports that she "swam with Flipper (the dolphin)". According to nurse at bedside,  Pt had been vomiting over the weekend and it eventually became bloody.     Pt has a hx of schizophrenia, seizure disorder, dementia, HLD and GERD. According to a conversation with RN at [facility], Pt has been institutionalized since her 20s. Of note, Pt was previously seen for a similar complaint in 9/2021. During that hospitalization, Pt underwent EGD by GI Dr. Menendez which revealed esophagitis with erythematous mucosa with erosion in the lower third of the esophagus and large hiatal hernia with no active bleeding seen.     Workup thus far is notable for the following: Initial vital signs within established limits. White blood cell count is mildly elevated at 11.38. UA was negative for infection. FOBT was (-). Acute abd x-ray was negative for obstruction. Ammonia was not elevated. Pt is Flu and Covid negative. In the ED, patient was started on LR and protonix infusion. GI was consulted from the ED.     Pt is being admitted to the hospital medicine service under attending Javed Serrato for the evaluation and management of acute GI bleed.       Past Medical History:   Diagnosis Date    Constipation  "    Gastroesophageal reflux disease with esophagitis without hemorrhage 9/26/2021    GERD (gastroesophageal reflux disease)     Hematemesis with nausea 9/26/2021    Hyperlipidemia     Memory loss     Schizophrenia     Seizures     Ulcer of esophagus without bleeding 9/26/2021       Past Surgical History:   Procedure Laterality Date    EXTRACTION OF TOOTH N/A 4/25/2022    Procedure: EXTRACTION, TOOTH;  Surgeon: Rob Farris DMD, MD;  Location: [facility];  Service: Oral Surgery;  Laterality: N/A;       Review of patient's allergies indicates:   Allergen Reactions    Ativan [lorazepam]     Prolixin [fluphenazine hcl]        No current facility-administered medications on file prior to encounter.     Current Outpatient Medications on File Prior to Encounter   Medication Sig    acetaminophen (TYLENOL) 500 MG tablet Take 500 mg by mouth every 6 (six) hours as needed for Pain.    acetaminophen-codeine 300-30mg (TYLENOL #3) 300-30 mg Tab     amoxicillin (AMOXIL) 250 MG capsule     ciprofloxacin HCl (CIPRO) 500 MG tablet     cran/vitC/mannose/FOS/bromeln (UTI-STAT ORAL) Take 30 mLs by mouth 2 (two) times daily.    docusate sodium (COLACE) 100 MG capsule Take 100 mg by mouth 2 (two) times daily.    DULCOLAX, BISACODYL, ORAL Take 2 tablets by mouth. 2 tabs 3x week m/w/f    esomeprazole (NEXIUM) 40 MG capsule     fluconazole (DIFLUCAN) 100 MG tablet     folic acid (FOLVITE) 1 MG tablet Take 1 mg by mouth once daily.    INVEGA SUSTENNA 117 mg/0.75 mL Syrg injection Inject 117 mg into the muscle every 30 days.    lactulose 10 gram/15 ml (CHRONULAC) 10 gram/15 mL (15 mL) solution Take 60 mLs by mouth 4 (four) times daily. 60 cc po qid    levetiracetam 500 mg/5 mL (5 mL) Soln Take 10 mLs (1,000 mg total) by mouth 2 (two) times daily.    multivitamin with minerals tablet Take 1 tablet by mouth once daily.    omeprazole (PRILOSEC) 40 MG capsule Take 40 mg by mouth once daily.    pantoprazole (PROTONIX) 40 MG tablet Take 1  tablet (40 mg total) by mouth once daily. (Patient not taking: No sig reported)     Family History       Family history is unknown by patient.          Tobacco Use    Smoking status: Never    Smokeless tobacco: Never   Substance and Sexual Activity    Alcohol use: Not Currently    Drug use: Never    Sexual activity: Not on file     Review of Systems   Reason unable to perform ROS: Altered mental status.   Objective:     Vital Signs (Most Recent):  Temp: 99.4 °F (37.4 °C) (12/12/22 2218)  Pulse: 74 (12/13/22 0558)  Resp: 16 (12/12/22 2214)  BP: 126/63 (12/13/22 0558)  SpO2: 96 % (12/13/22 0558) Vital Signs (24h Range):  Temp:  [99.4 °F (37.4 °C)] 99.4 °F (37.4 °C)  Pulse:  [69-94] 74  Resp:  [16] 16  SpO2:  [95 %-97 %] 96 %  BP: (109-142)/(52-82) 126/63     Weight: 54.4 kg (120 lb)  Body mass index is 18.79 kg/m².    Physical Exam  Constitutional:       Appearance: She is ill-appearing.   HENT:      Head: Normocephalic and atraumatic.      Right Ear: External ear normal.      Left Ear: External ear normal.      Nose: Nose normal.   Eyes:      General:         Right eye: No discharge.         Left eye: No discharge.      Extraocular Movements: Extraocular movements intact.      Conjunctiva/sclera: Conjunctivae normal.      Pupils: Pupils are equal, round, and reactive to light.   Cardiovascular:      Rate and Rhythm: Normal rate and regular rhythm.   Pulmonary:      Effort: Pulmonary effort is normal.   Abdominal:      General: Abdomen is flat. Bowel sounds are normal.      Palpations: Abdomen is soft.      Tenderness: There is abdominal tenderness.   Skin:     General: Skin is warm and dry.      Coloration: Skin is not jaundiced.      Findings: No bruising or lesion.   Neurological:      Mental Status: She is alert. She is disoriented.   Psychiatric:         Behavior: Behavior is slowed and withdrawn.         Thought Content: Thought content is delusional.         CRANIAL NERVES     CN III, IV, VI   Pupils are equal,  round, and reactive to light.     Significant Labs: All pertinent labs within the past 24 hours have been reviewed.    Significant Imaging: I have reviewed all pertinent imaging results/findings within the past 24 hours.    Assessment/Plan:     * GI bleed       Rockall score of 2-5.3% rebleeding risk  GI consulted; recommendations appreciated   H/H q4h x 2  Continue to monitor BP  Protonix and LR Infusion  NPO  PT/OT    Altered mental state  According to conversation with RN at  [facility] appears to be at baseline  CT without contrast  TSH and UDS     Schizophrenia, unspecified  Pt takes Invega- Sustenna 117 mg injection q30d      GERD (gastroesophageal reflux disease)  Hold home omeprazole during hospital stay  PPI infusion       Convulsions  Continue Levitiracetam 1000mg bid  Keppra level         VTE Risk Mitigation (From admission, onward)           Ordered     Place sequential compression device  Until discontinued         12/13/22 0537     Reason for No Pharmacological VTE Prophylaxis  Once        Question:  Reasons:  Answer:  Active Bleeding    12/13/22 0537     IP VTE LOW RISK PATIENT  Once         12/13/22 0537                       Gurdeep Montalvo DO  Department of Hospital Medicine   [facility] Emergency Department

## 2022-12-13 NOTE — HPI
"Pt is a 67 y.o. f . presenting with witnessed "coffee-ground emesis".  Pt cannot provide a hx; she appears to be psychotic at the time of my exam and makes references to her grandmother and Kendrick "walking on the land and the water". She also reports that she "swam with Flipper (the dolphin)". According to nurse at bedside,  Pt had been vomiting over the weekend and it eventually became bloody.     Pt has a hx of schizophrenia, seizure disorder, dementia, HLD and GERD. According to a conversation with RN at [facility], Pt has been institutionalized since her 20s. Of note, Pt was previously seen for a similar complaint in 9/2021. During that hospitalization, Pt underwent EGD by GI Dr. Menendez which revealed esophagitis with erythematous mucosa with erosion in the lower third of the esophagus and large hiatal hernia with no active bleeding seen.     Workup thus far is notable for the following: Initial vital sings within established limits. White blood cell count is mildly elevated at 11.38. UA was negative for infection. FOBT was (-). Acute abd x-ray was negative for obstruction. Pt is Flu and Covid negative. In the ED, patient was started on LR and protonix infusion. GI was consulted from the ED.     Pt is being admitted to the hospital medicine service under attending Javed Serrato for the evaluation and management of acute GI bleed.   "

## 2022-12-13 NOTE — ED NOTES
"Physician at bedside w/resident. Pt states "I was at my grave when you woke me up." speaking to physician. VS stable at this time. No needs voiced.     "

## 2022-12-13 NOTE — ASSESSMENT & PLAN NOTE
According to conversation with RN at  [facility] appears to be at baseline  CT without contrast  TSH and UDS

## 2022-12-13 NOTE — ASSESSMENT & PLAN NOTE
12/13/2022-patient admitted with reports of coffee-ground emesis from nursing home facility.  H&H is stable.  Labs noted.  Prior EGD approximately a year ago also noted.  Remains hemodynamically stable.  We will continue with Protonix.  Continue to monitor for any further bleeding.  Note heme negative stool.  Will review case further with Dr. Mccallum with plan an addendum to follow.

## 2022-12-13 NOTE — ASSESSMENT & PLAN NOTE
GI consulted; recommendations appreciated   H/H q4h x 2  Continue to monitor BP  Protonix and LR Infusion  NPO

## 2022-12-14 LAB
ANION GAP SERPL CALCULATED.3IONS-SCNC: 19 MMOL/L (ref 7–16)
BUN SERPL-MCNC: 7 MG/DL (ref 7–18)
BUN/CREAT SERPL: 10 (ref 6–20)
CALCIUM SERPL-MCNC: 8.6 MG/DL (ref 8.5–10.1)
CHLORIDE SERPL-SCNC: 106 MMOL/L (ref 98–107)
CO2 SERPL-SCNC: 22 MMOL/L (ref 21–32)
CREAT SERPL-MCNC: 0.7 MG/DL (ref 0.55–1.02)
EGFR (NO RACE VARIABLE) (RUSH/TITUS): 94 ML/MIN/1.73M²
GLUCOSE SERPL-MCNC: 104 MG/DL (ref 70–105)
GLUCOSE SERPL-MCNC: 176 MG/DL (ref 70–105)
GLUCOSE SERPL-MCNC: 62 MG/DL (ref 74–106)
GLUCOSE SERPL-MCNC: 67 MG/DL (ref 70–105)
GLUCOSE SERPL-MCNC: 67 MG/DL (ref 70–105)
GLUCOSE SERPL-MCNC: 93 MG/DL (ref 70–105)
HGB BLD-MCNC: 11.4 G/DL (ref 12–16)
HGB BLD-MCNC: 11.9 G/DL (ref 12–16)
HGB BLD-MCNC: 12.3 G/DL (ref 12–16)
POTASSIUM SERPL-SCNC: 3.8 MMOL/L (ref 3.5–5.1)
SODIUM SERPL-SCNC: 143 MMOL/L (ref 136–145)

## 2022-12-14 PROCEDURE — S5010 5% DEXTROSE AND 0.45% SALINE: HCPCS | Performed by: FAMILY MEDICINE

## 2022-12-14 PROCEDURE — 25000003 PHARM REV CODE 250: Performed by: FAMILY MEDICINE

## 2022-12-14 PROCEDURE — 82962 GLUCOSE BLOOD TEST: CPT

## 2022-12-14 PROCEDURE — 99232 PR SUBSEQUENT HOSPITAL CARE,LEVL II: ICD-10-PCS | Mod: GC,,, | Performed by: FAMILY MEDICINE

## 2022-12-14 PROCEDURE — 97166 OT EVAL MOD COMPLEX 45 MIN: CPT

## 2022-12-14 PROCEDURE — 63600175 PHARM REV CODE 636 W HCPCS: Performed by: INTERNAL MEDICINE

## 2022-12-14 PROCEDURE — 97161 PT EVAL LOW COMPLEX 20 MIN: CPT

## 2022-12-14 PROCEDURE — 85014 HEMATOCRIT: CPT | Performed by: FAMILY MEDICINE

## 2022-12-14 PROCEDURE — 85018 HEMOGLOBIN: CPT | Performed by: INTERNAL MEDICINE

## 2022-12-14 PROCEDURE — 36415 COLL VENOUS BLD VENIPUNCTURE: CPT | Performed by: FAMILY MEDICINE

## 2022-12-14 PROCEDURE — 80048 BASIC METABOLIC PNL TOTAL CA: CPT | Performed by: INTERNAL MEDICINE

## 2022-12-14 PROCEDURE — 85018 HEMOGLOBIN: CPT | Performed by: FAMILY MEDICINE

## 2022-12-14 PROCEDURE — 11000001 HC ACUTE MED/SURG PRIVATE ROOM

## 2022-12-14 PROCEDURE — 63600175 PHARM REV CODE 636 W HCPCS: Performed by: HOSPITALIST

## 2022-12-14 PROCEDURE — 94761 N-INVAS EAR/PLS OXIMETRY MLT: CPT

## 2022-12-14 PROCEDURE — C9113 INJ PANTOPRAZOLE SODIUM, VIA: HCPCS | Performed by: INTERNAL MEDICINE

## 2022-12-14 PROCEDURE — 25000003 PHARM REV CODE 250: Performed by: INTERNAL MEDICINE

## 2022-12-14 PROCEDURE — 36415 COLL VENOUS BLD VENIPUNCTURE: CPT | Performed by: INTERNAL MEDICINE

## 2022-12-14 PROCEDURE — 99232 SBSQ HOSP IP/OBS MODERATE 35: CPT | Mod: GC,,, | Performed by: FAMILY MEDICINE

## 2022-12-14 RX ORDER — DEXTROSE MONOHYDRATE AND SODIUM CHLORIDE 5; .45 G/100ML; G/100ML
INJECTION, SOLUTION INTRAVENOUS CONTINUOUS
Status: DISCONTINUED | OUTPATIENT
Start: 2022-12-14 | End: 2022-12-19 | Stop reason: HOSPADM

## 2022-12-14 RX ADMIN — DEXTROSE AND SODIUM CHLORIDE: 5; 450 INJECTION, SOLUTION INTRAVENOUS at 04:12

## 2022-12-14 RX ADMIN — SODIUM CHLORIDE, POTASSIUM CHLORIDE, SODIUM LACTATE AND CALCIUM CHLORIDE: 600; 310; 30; 20 INJECTION, SOLUTION INTRAVENOUS at 02:12

## 2022-12-14 RX ADMIN — PANTOPRAZOLE SODIUM 8 MG/HR: 40 INJECTION, POWDER, LYOPHILIZED, FOR SOLUTION INTRAVENOUS at 04:12

## 2022-12-14 RX ADMIN — PANTOPRAZOLE SODIUM 8 MG/HR: 40 INJECTION, POWDER, LYOPHILIZED, FOR SOLUTION INTRAVENOUS at 10:12

## 2022-12-14 RX ADMIN — PANTOPRAZOLE SODIUM 8 MG/HR: 40 INJECTION, POWDER, LYOPHILIZED, FOR SOLUTION INTRAVENOUS at 01:12

## 2022-12-14 RX ADMIN — DEXTROSE MONOHYDRATE 125 ML: 100 INJECTION, SOLUTION INTRAVENOUS at 10:12

## 2022-12-14 RX ADMIN — SODIUM CHLORIDE, POTASSIUM CHLORIDE, SODIUM LACTATE AND CALCIUM CHLORIDE: 600; 310; 30; 20 INJECTION, SOLUTION INTRAVENOUS at 01:12

## 2022-12-14 RX ADMIN — DEXTROSE MONOHYDRATE 125 ML: 100 INJECTION, SOLUTION INTRAVENOUS at 04:12

## 2022-12-14 RX ADMIN — LEVETIRACETAM 500 MG: 100 INJECTION, SOLUTION INTRAVENOUS at 09:12

## 2022-12-14 RX ADMIN — LEVETIRACETAM 500 MG: 100 INJECTION, SOLUTION INTRAVENOUS at 10:12

## 2022-12-14 NOTE — ASSESSMENT & PLAN NOTE
According to conversation with RN at  [facility] appears to be at baseline  CT Head reveals no acute intracranial process   TSH wnl

## 2022-12-14 NOTE — PLAN OF CARE
[id number] SW went to bedside to speak with pt or family. Pt unable to provide information and there was no family at bedside. SW called pt's sister Allie Hinkle, who is the only contact listed on chart. Allie zamarripa, SW left . SW to speak with nursing [place]

## 2022-12-14 NOTE — PLAN OF CARE
Problem: Infection  Goal: Absence of Infection Signs and Symptoms  Outcome: Ongoing, Progressing  Intervention: Prevent or Manage Infection  Flowsheets (Taken 12/13/2022 2051)  Fever Reduction/Comfort Measures: lightweight clothing     Problem: Adult Inpatient Plan of Care  Goal: Plan of Care Review  Outcome: Ongoing, Progressing  Flowsheets (Taken 12/13/2022 2051)  Plan of Care Reviewed With: patient  Goal: Patient-Specific Goal (Individualized)  Outcome: Ongoing, Progressing  Goal: Absence of Hospital-Acquired Illness or Injury  Outcome: Ongoing, Progressing  Intervention: Identify and Manage Fall Risk  Flowsheets (Taken 12/13/2022 2051)  Safety Promotion/Fall Prevention:   assistive device/personal item within reach   bed alarm set  Intervention: Prevent Skin Injury  Flowsheets (Taken 12/13/2022 2051)  Body Position: turned  Skin Protection:   adhesive use limited   incontinence pads utilized  Intervention: Prevent and Manage VTE (Venous Thromboembolism) Risk  Flowsheets (Taken 12/13/2022 2051)  Activity Management:   Arm raise - L1   Rolling - L1  VTE Prevention/Management:   ROM (passive) performed   intravenous hydration   ROM (active) performed  Range of Motion: active ROM (range of motion) encouraged  Intervention: Prevent Infection  Flowsheets (Taken 12/13/2022 2051)  Infection Prevention: rest/sleep promoted  Goal: Optimal Comfort and Wellbeing  Outcome: Ongoing, Progressing  Goal: Readiness for Transition of Care  Outcome: Ongoing, Progressing     Problem: Skin Injury Risk Increased  Goal: Skin Health and Integrity  Outcome: Ongoing, Progressing

## 2022-12-14 NOTE — PT/OT/SLP EVAL
Occupational Therapy   Evaluation    Name: Suzie Hennessy  MRN: [id number]  Admitting Diagnosis: GI bleed  Recent Surgery: * No surgery found *      Recommendations:     Discharge Recommendations: home with home health  Discharge Equipment Recommendations:  walker, rolling  Barriers to discharge:       Assessment:     Suzie Hennessy is a 67 y.o. female with a medical diagnosis of GI bleed.  She presents with GI bleed with coffee ground emesis. Performance deficits affecting function: weakness, impaired endurance, impaired self care skills, impaired functional mobility, impaired balance, decreased coordination, decreased lower extremity function, decreased upper extremity function, decreased safety awareness. Pt resides in SNF and presents with AMS. Pt has a history of Dementia but was noted to have decreased alertness and orientation. Pt responded somewhat appropriately to face to face conversation. Pt is very debilitated at this time and requires MAX A with mobility and ADLs. Pt wound benefit from skilled OT if cognition is appropriate. Skilled OT to attempt 2-3 visits with pt and will determine appropriateness for therapy. Pt will require increase assistance at d/c therefore it is recommended that the pt returns to the SNF.     Rehab Prognosis: Fair and Poor; patient would benefit from acute skilled OT services to address these deficits and reach maximum level of function.       Plan:     Patient to be seen 5 x/week to address the above listed problems via therapeutic exercises, therapeutic activities  Plan of Care Expires:    Plan of Care Reviewed with: patient    Subjective     Chief Complaint: limited movement; weakness  Patient/Family Comments/goals: increase ADLs to lessen burden of care    Occupational Profile:  Living Environment: Pt resides in a SNF  Previous level of function: undetermined due pt being a poor historian   Equipment Used at Home: walker, rolling  Assistance upon Discharge: SNF increased  assistance recommended.     Pain/Comfort:  Pain Rating 1: 0/10    Patients cultural, spiritual, Episcopalian conflicts given the current situation: no    Objective:     Communicated with: Pt prior to session.  Patient found supine with   upon OT entry to room.    General Precautions: Standard, fall  Orthopedic Precautions:    Braces:    Respiratory Status: Room air    Occupational Performance:    Bed Mobility:    Patient completed Rolling/Turning to Left with  maximal assistance  Patient completed Rolling/Turning to Right with maximal assistance  Patient completed Supine to Sit with maximal assistance    Functional Mobility/Transfers:  Patient completed Sit <> Stand Transfer with maximal assistance  with  hand-held assist   Functional Mobility: decreased functional mobility at this time     Activities of Daily Living:  Grooming: maximal assistance    Bathing: maximal assistance    Upper Body Dressing: maximal assistance    Lower Body Dressing: maximal assistance    Toileting: maximal assistance      Cognitive/Visual Perceptual:  Cognitive/Psychosocial Skills:     -       Oriented to: poor orientation   -       Safety awareness/insight to disability: impaired     Physical Exam:  Balance:    -       poor standing; Fair minus sitting  Upper Extremity Range of Motion:     -       Right Upper Extremity: limited AROM  -       Left Upper Extremity: Deficits: limited AROM due to poor strength  Upper Extremity Strength:    -       Right Upper Extremity: 2-/5  -       Left Upper Extremity: 1/5    AMPAC 6 Click ADL:  AMPAC Total Score: 13    Treatment & Education:  OT eval    Patient left supine with all lines intact and call button in reach    GOALS:   Multidisciplinary Problems       Occupational Therapy Goals          Problem: Occupational Therapy    Goal Priority Disciplines Outcome Interventions   Occupational Therapy Goal     OT, PT/OT Ongoing, Progressing    Description: Goals to be met by: 12/28/22     Patient will  increase functional independence with ADLs by performing:  *Pending pt's participation and cognition*      Supine to sit with Moderate Assistance.  Standing pivot to BSC with Mod A  Grooming while sitting EOB with Min A  Tolerate sitting EOB x 5-10 mins while completing self feeding and grooming ADLs @ Min A  Upper extremity exercise program x10 reps per Ot's instructions                         History:     Past Medical History:   Diagnosis Date    Constipation     Gastroesophageal reflux disease with esophagitis without hemorrhage 9/26/2021    GERD (gastroesophageal reflux disease)     Hematemesis with nausea 9/26/2021    Hyperlipidemia     Memory loss     Schizophrenia     Seizures     Ulcer of esophagus without bleeding 9/26/2021         Past Surgical History:   Procedure Laterality Date    EXTRACTION OF TOOTH N/A 4/25/2022    Procedure: EXTRACTION, TOOTH;  Surgeon: Rob Farris DMD, MD;  Location: [facility];  Service: Oral Surgery;  Laterality: N/A;       Time Tracking:     OT Date of Treatment:    OT Start Time:  9:20  OT Stop Time:  9:40  OT Total Time (min):      Billable Minutes:Evaluation 20 12/14/2022

## 2022-12-14 NOTE — PLAN OF CARE
Problem: Occupational Therapy  Goal: Occupational Therapy Goal  Description: Goals to be met by: 12/28/22     Patient will increase functional independence with ADLs by performing:  *Pending pt's participation and cognition*      Supine to sit with Moderate Assistance.  Standing pivot to BSC with Mod A  Grooming while sitting EOB with Min A  Tolerate sitting EOB x 5-10 mins while completing self feeding and grooming ADLs @ Min A  Upper extremity exercise program x10 reps per Ot's instructions    Outcome: Ongoing, Progressing

## 2022-12-14 NOTE — PROGRESS NOTES
"[facility] Hospital Medicine  Progress Note    Patient Name: Suzie Hennessy  MRN: [id number]  Patient Class: IP- Inpatient   Admission Date: 12/12/2022  Length of Stay: 1 days  Attending Physician: Javed Serrato Jr., MD  Primary Care Provider: Primary Doctor No        Subjective:     Principal Problem:GI bleed        HPI:  Pt is a 67 y.o. f . presenting with witnessed "coffee-ground emesis".  Pt cannot provide a hx; she appears to be psychotic at the time of my exam and makes references to her grandmother and Kendrick "walking on the land and the water". She also reports that she "swam with Flipper (the dolphin)". According to nurse at bedside,  Pt had been vomiting over the weekend and it eventually became bloody.     Pt has a hx of schizophrenia, seizure disorder, dementia, HLD and GERD. According to a conversation with RN at [facility], Pt has been institutionalized since her 20s. Of note, Pt was previously seen for a similar complaint in 9/2021. During that hospitalization, Pt underwent EGD by GI Dr. Menendez which revealed esophagitis with erythematous mucosa with erosion in the lower third of the esophagus and large hiatal hernia with no active bleeding seen.     Workup thus far is notable for the following: Initial vital sings within established limits. White blood cell count is mildly elevated at 11.38. UA was negative for infection. FOBT was (-). Acute abd x-ray was negative for obstruction. Pt is Flu and Covid negative. In the ED, patient was started on LR and protonix infusion. GI was consulted from the ED.     Pt is being admitted to the hospital medicine service under attending Javed Serrato for the evaluation and management of acute GI bleed.       Overview/Hospital Course:  No notes on file    Interval History: Pt examined at bedside today. She is awake but totally non-communicative. Pt does not respond to any questions.     Speech therapy evaluated the Pt yesterday but she did not " cooperate with the evaluation. I reached out to Dr. Mccallum and he stated that he will perform the EGD tomorrow.     I have some concern regarding the possibility of the Pt aspirating. I will initiate D5 1/2 NS to give Pt some caloric support.   Review of Systems   Unable to perform ROS: Patient nonverbal   Objective:     Vital Signs (Most Recent):  Temp: 98.9 °F (37.2 °C) (12/14/22 1620)  Pulse: 68 (12/14/22 1620)  Resp: 17 (12/14/22 1620)  BP: 119/61 (12/14/22 1620)  SpO2: 97 % (12/14/22 1620) Vital Signs (24h Range):  Temp:  [98.4 °F (36.9 °C)-99.3 °F (37.4 °C)] 98.9 °F (37.2 °C)  Pulse:  [63-91] 68  Resp:  [15-17] 17  SpO2:  [94 %-98 %] 97 %  BP: ()/(53-69) 119/61     Weight: 49.5 kg (109 lb 3.2 oz)  Body mass index is 17.1 kg/m².  No intake or output data in the 24 hours ending 12/14/22 1628   Physical Exam  Constitutional:       Appearance: She is ill-appearing.   HENT:      Head: Normocephalic and atraumatic.      Right Ear: External ear normal.      Left Ear: External ear normal.      Nose: Nose normal.   Eyes:      General:         Right eye: No discharge.         Left eye: No discharge.      Extraocular Movements: Extraocular movements intact.      Conjunctiva/sclera: Conjunctivae normal.      Pupils: Pupils are equal, round, and reactive to light.   Cardiovascular:      Rate and Rhythm: Normal rate and regular rhythm.   Pulmonary:      Effort: Pulmonary effort is normal.   Abdominal:      General: Abdomen is flat. Bowel sounds are normal.      Palpations: Abdomen is soft.   Skin:     General: Skin is warm and dry.      Coloration: Skin is not jaundiced.      Findings: No bruising or lesion.   Neurological:      Mental Status: She is lethargic.   Psychiatric:         Behavior: Behavior is slowed and withdrawn.         Thought Content: Thought content is delusional.       Significant Labs: All pertinent labs within the past 24 hours have been reviewed.    Significant Imaging: I have reviewed all  pertinent imaging results/findings within the past 24 hours.      Assessment/Plan:      * GI bleed  GI consulted; EGD planned for tomorrow   H/H q8h  Continue to monitor BP  Protonix and D5 1/2 NS infusion   NPO      Altered mental state  According to conversation with RN at  [facility], Pt appears to be at baseline  CT Head reveals no acute intracranial process   TSH wnl       Schizophrenia, unspecified  Pt takes Invega- Sustenna 117 mg/0.75 mL Syrg injection every 30days         GERD (gastroesophageal reflux disease)  Hold home omeprazole during hospital stay  PPI infusion         Convulsions  Continue Levitiracetam 1000mg bid        VTE Risk Mitigation (From admission, onward)         Ordered     Place sequential compression device  Until discontinued         12/13/22 0537     Reason for No Pharmacological VTE Prophylaxis  Once        Question:  Reasons:  Answer:  Active Bleeding    12/13/22 0537     IP VTE LOW RISK PATIENT  Once         12/13/22 0537                Discharge Planning   [facility]:      Code Status: DNR   Is the patient medically ready for discharge?:     Reason for patient still in hospital (select all that apply): Treatment  Discharge Plan A: Return to nursing home                  Gurdeep Montalvo DO  Department of Hospital Medicine   [facility]

## 2022-12-14 NOTE — PLAN OF CARE
[facility]  Initial Discharge Assessment       Primary Care Provider: Primary Doctor No    Admission Diagnosis: Upper GI bleed [K92.2]    Admission Date: 12/12/2022  Expected Discharge Date:     Discharge Barriers Identified: None    Payor: MEDICARE / Plan: MEDICARE PART A & B / Product Type: Government /     Extended Emergency Contact Information  Primary Emergency Contact: YESI HINKLE  Mobile Phone: [phone]  Relation: Sister  Preferred language: English   needed? No    Discharge Plan A: Return to nursing home  Discharge Plan B: Return to Nursing Home    No Pharmacies Listed    Initial Assessment (most recent)       Adult Discharge Assessment - 12/14/22 1435          Discharge Assessment    Assessment Type Discharge Planning Assessment     Source of Information family     If unable to respond/provide information was family/caregiver contacted? Yes     Contact Name/Number Yesi Hinkle (sister) [phone]     People in Home facility resident     Facility Arrived From: [facility]     Do you expect to return to your current living situation? Yes     Do you have help at home or someone to help you manage your care at home? Yes     Who are your caregiver(s) and their phone number(s)? staff     Home Accessibility wheelchair accessible     Home Layout Able to live on 1st floor     Readmission within 30 days? No     Patient currently being followed by outpatient case management? Unable to determine (comments)     Do you currently have service(s) that help you manage your care at home? Yes     Name and Contact number of agency [facility]     Is the pt/caregiver preference to resume services with current agency Yes     Do you take prescription medications? Yes     Do you have prescription coverage? Yes     Do you have any problems affording any of your prescribed medications? No     Who is going to help you get home at discharge? facility/ [facility]     How do you get to doctors appointments?  agency     Are you on dialysis? No     Do you take coumadin? No     Discharge Plan A Return to nursing home     Discharge Plan B Return to Nursing Home     Discharge Plan discussed with: Sibling     Name(s) and Number(s) Allie Hinkle (sister) [phone]     Discharge Barriers Identified None                          SW spoke with Allie Hinkle (sister) [phone]. Pt is from [facility]. Dcp is to return. Verbal choice for [facility] obtained. IMM reviewed. SHANTEL contacted [facility], who stated that they are expecting pt upon dc. SS following for dc needs.

## 2022-12-14 NOTE — PLAN OF CARE
Problem: Physical Therapy  Goal: Physical Therapy Goal  Description: PT evaluation completed with no further PT interventions at this time. Pt does not follow commands or participate in functional mobility.  Outcome: Met

## 2022-12-14 NOTE — PROGRESS NOTES
[facility]  Gastroenterology  Progress Note    Patient Name: Suzie Hennessy  MRN: [id number]  Admission Date: 12/12/2022  Hospital Length of Stay: 1 days  Code Status: DNR   Attending Provider: Javed Serrato Jr., MD  Consulting Provider: DALY Mccallum MD  Primary Care Physician: Primary Doctor No  Principal Problem: GI bleed      Subjective:     Interval History:   No GI bleeding seen. Refused to cooperate for Speech therapy eval today and refuses to eat, drink or take po meds per nursing staff.    Review of Systems   Unable to perform ROS: Dementia   Objective:     Vital Signs (Most Recent):  Temp: 98.9 °F (37.2 °C) (12/14/22 1620)  Pulse: 68 (12/14/22 1620)  Resp: 17 (12/14/22 1620)  BP: 119/61 (12/14/22 1620)  SpO2: 97 % (12/14/22 1620) Vital Signs (24h Range):  Temp:  [98.4 °F (36.9 °C)-99.3 °F (37.4 °C)] 98.9 °F (37.2 °C)  Pulse:  [63-91] 68  Resp:  [15-17] 17  SpO2:  [94 %-98 %] 97 %  BP: ()/(53-69) 119/61     Weight: 49.5 kg (109 lb 3.2 oz) (12/13/22 2020)  Body mass index is 17.1 kg/m².    No intake or output data in the 24 hours ending 12/14/22 1742    Lines/Drains/Airways       Drain  Duration             Female External Urinary Catheter 12/14/22 0704 <1 day              Peripheral Intravenous Line  Duration                  Peripheral IV - Single Lumen 12/12/22 2253 20 G Right Forearm 1 day                    Physical Exam  Vitals and nursing note reviewed. Exam conducted with a chaperone present.   Constitutional:       General: She is not in acute distress.  HENT:      Head: Normocephalic and atraumatic.   Eyes:      Extraocular Movements: Extraocular movements intact.   Cardiovascular:      Rate and Rhythm: Normal rate and regular rhythm.      Pulses: Normal pulses.      Heart sounds: Normal heart sounds.   Pulmonary:      Effort: Pulmonary effort is normal. No respiratory distress.      Breath sounds: Normal breath sounds. No wheezing.   Abdominal:      General: Bowel  sounds are normal. There is no distension.      Palpations: Abdomen is soft.      Tenderness: There is no abdominal tenderness.   Musculoskeletal:      Right lower leg: No edema.      Left lower leg: No edema.   Skin:     Coloration: Skin is not jaundiced.   Neurological:      Mental Status: She is alert.       Significant Labs:  CBC:   Recent Labs   Lab 12/12/22  2240 12/13/22  0440 12/13/22  0908 12/13/22  1013 12/13/22  2357 12/14/22  0654 12/14/22  1531   WBC 11.38*  --   --   --   --   --   --    HGB 14.0 11.7*  --    < > 11.9* 12.3 11.4*   HCT 42.8 38.9 32*  --   --   --   --      --   --   --   --   --   --     < > = values in this interval not displayed.     BMP:   Recent Labs   Lab 12/14/22  0654   GLU 62*      K 3.8      CO2 22   BUN 7   CREATININE 0.70   CALCIUM 8.6     CMP:   Recent Labs   Lab 12/12/22  2240 12/14/22  0654   * 62*   CALCIUM 9.3 8.6   ALBUMIN 3.8  --    PROT 7.0  --     143   K 3.8 3.8   CO2 29 22    106   BUN 12 7   CREATININE 0.79 0.70   ALKPHOS 102  --    ALT 17  --    AST 17  --    BILITOT 0.7  --      Coagulation:   Recent Labs   Lab 12/13/22  0540   INR 1.14   APTT 20.0*     Recent Lab Results  (Last 5 results in the past 24 hours)        12/14/22  1649   12/14/22  1631   12/14/22  1531   12/14/22  1119   12/14/22  1001        HEMOGLOBIN     11.4           POC Glucose 176   67     104   67                                Significant Imaging:  Imaging results within the past 24 hours have been reviewed.    Assessment/Plan:     * GI bleed  12/14/22- stable with no sign of further bleeding since admission. EGD postponed today until tomorrow. I feel she needs PEG tube. Nursing home states she has refused to eat or take po meds recently and this continues in hospital. Will try to contact family for consent for feeding tube with EGD.    12/13/2022-patient admitted with reports of coffee-ground emesis from nursing home facility.  H&H is stable.  Labs  noted.  Prior EGD approximately a year ago also noted.  Remains hemodynamically stable.  We will continue with Protonix.  Continue to monitor for any further bleeding.  Note heme negative stool.  Will review case further with Dr. Mccallum with plan an addendum to follow.        Thank you for your consult. I will follow-up with patient. Please contact us if you have any additional questions.    DALY Mccallum MD  Gastroenterology  [facility]

## 2022-12-14 NOTE — ASSESSMENT & PLAN NOTE
GI consulted; EGD planned for tomorrow   H/H q8h  Continue to monitor BP  Protonix and D5 1/2 NS infusion   NPO

## 2022-12-14 NOTE — SUBJECTIVE & OBJECTIVE
Interval History: Pt examined at bedside today. She is awake but totally non-communicative. Pt does not respond to any questions.     Speech therapy evaluated the Pt yesterday but she did not cooperate with the evaluation. I reached out to Dr. Mccallum and he stated that he will perform the EGD tomorrow.     I have some concern regarding the possibility of the Pt aspirating. I will initiate D5 1/2 NS to give Pt some caloric support.   Review of Systems   Unable to perform ROS: Patient nonverbal   Objective:     Vital Signs (Most Recent):  Temp: 98.9 °F (37.2 °C) (12/14/22 1620)  Pulse: 68 (12/14/22 1620)  Resp: 17 (12/14/22 1620)  BP: 119/61 (12/14/22 1620)  SpO2: 97 % (12/14/22 1620) Vital Signs (24h Range):  Temp:  [98.4 °F (36.9 °C)-99.3 °F (37.4 °C)] 98.9 °F (37.2 °C)  Pulse:  [63-91] 68  Resp:  [15-17] 17  SpO2:  [94 %-98 %] 97 %  BP: ()/(53-69) 119/61     Weight: 49.5 kg (109 lb 3.2 oz)  Body mass index is 17.1 kg/m².  No intake or output data in the 24 hours ending 12/14/22 1628   Physical Exam  Constitutional:       Appearance: She is ill-appearing.   HENT:      Head: Normocephalic and atraumatic.      Right Ear: External ear normal.      Left Ear: External ear normal.      Nose: Nose normal.   Eyes:      General:         Right eye: No discharge.         Left eye: No discharge.      Extraocular Movements: Extraocular movements intact.      Conjunctiva/sclera: Conjunctivae normal.      Pupils: Pupils are equal, round, and reactive to light.   Cardiovascular:      Rate and Rhythm: Normal rate and regular rhythm.   Pulmonary:      Effort: Pulmonary effort is normal.   Abdominal:      General: Abdomen is flat. Bowel sounds are normal.      Palpations: Abdomen is soft.   Skin:     General: Skin is warm and dry.      Coloration: Skin is not jaundiced.      Findings: No bruising or lesion.   Neurological:      Mental Status: She is lethargic.   Psychiatric:         Behavior: Behavior is slowed and withdrawn.          Thought Content: Thought content is delusional.       Significant Labs: All pertinent labs within the past 24 hours have been reviewed.    Significant Imaging: I have reviewed all pertinent imaging results/findings within the past 24 hours.

## 2022-12-14 NOTE — ASSESSMENT & PLAN NOTE
12/14/22- stable with no sign of further bleeding since admission. EGD postponed today until tomorrow. I feel she needs PEG tube. Nursing home states she has refused to eat or take po meds recently and this continues in hospital. Will try to contact family for consent for feeding tube with EGD.    12/13/2022-patient admitted with reports of coffee-ground emesis from nursing home facility.  H&H is stable.  Labs noted.  Prior EGD approximately a year ago also noted.  Remains hemodynamically stable.  We will continue with Protonix.  Continue to monitor for any further bleeding.  Note heme negative stool.  Will review case further with Dr. Mccallum with plan an addendum to follow.

## 2022-12-14 NOTE — PLAN OF CARE
Problem: Adult Inpatient Plan of Care  Goal: Patient-Specific Goal (Individualized)  Outcome: Ongoing, Progressing     Problem: Skin Injury Risk Increased  Goal: Skin Health and Integrity  Outcome: Ongoing, Progressing

## 2022-12-14 NOTE — PT/OT/SLP PROGRESS
Speech Language Pathology      Suzie Hennessy  MRN: [id number]    Patient not seen today secondary to Patient unwilling to participate (Pt refused to participate in BSE. Pt let the water from the spoon run out of her lips.). D/C order for BEDSIDE SWALLOW EVALUATION at this time. Reconsult if patient becomes more cooperative.

## 2022-12-14 NOTE — SUBJECTIVE & OBJECTIVE
Subjective:     Interval History:   No GI bleeding seen. Refused to cooperate for Speech therapy eval today and refuses to eat, drink or take po meds per nursing staff.    Review of Systems   Unable to perform ROS: Dementia   Objective:     Vital Signs (Most Recent):  Temp: 98.9 °F (37.2 °C) (12/14/22 1620)  Pulse: 68 (12/14/22 1620)  Resp: 17 (12/14/22 1620)  BP: 119/61 (12/14/22 1620)  SpO2: 97 % (12/14/22 1620) Vital Signs (24h Range):  Temp:  [98.4 °F (36.9 °C)-99.3 °F (37.4 °C)] 98.9 °F (37.2 °C)  Pulse:  [63-91] 68  Resp:  [15-17] 17  SpO2:  [94 %-98 %] 97 %  BP: ()/(53-69) 119/61     Weight: 49.5 kg (109 lb 3.2 oz) (12/13/22 2020)  Body mass index is 17.1 kg/m².    No intake or output data in the 24 hours ending 12/14/22 1742    Lines/Drains/Airways       Drain  Duration             Female External Urinary Catheter 12/14/22 0704 <1 day              Peripheral Intravenous Line  Duration                  Peripheral IV - Single Lumen 12/12/22 2253 20 G Right Forearm 1 day                    Physical Exam  Vitals and nursing note reviewed. Exam conducted with a chaperone present.   Constitutional:       General: She is not in acute distress.  HENT:      Head: Normocephalic and atraumatic.   Eyes:      Extraocular Movements: Extraocular movements intact.   Cardiovascular:      Rate and Rhythm: Normal rate and regular rhythm.      Pulses: Normal pulses.      Heart sounds: Normal heart sounds.   Pulmonary:      Effort: Pulmonary effort is normal. No respiratory distress.      Breath sounds: Normal breath sounds. No wheezing.   Abdominal:      General: Bowel sounds are normal. There is no distension.      Palpations: Abdomen is soft.      Tenderness: There is no abdominal tenderness.   Musculoskeletal:      Right lower leg: No edema.      Left lower leg: No edema.   Skin:     Coloration: Skin is not jaundiced.   Neurological:      Mental Status: She is alert.       Significant Labs:  CBC:   Recent Labs   Lab  12/12/22  2240 12/13/22  0440 12/13/22  0908 12/13/22  1013 12/13/22  2357 12/14/22  0654 12/14/22  1531   WBC 11.38*  --   --   --   --   --   --    HGB 14.0 11.7*  --    < > 11.9* 12.3 11.4*   HCT 42.8 38.9 32*  --   --   --   --      --   --   --   --   --   --     < > = values in this interval not displayed.     BMP:   Recent Labs   Lab 12/14/22  0654   GLU 62*      K 3.8      CO2 22   BUN 7   CREATININE 0.70   CALCIUM 8.6     CMP:   Recent Labs   Lab 12/12/22 2240 12/14/22  0654   * 62*   CALCIUM 9.3 8.6   ALBUMIN 3.8  --    PROT 7.0  --     143   K 3.8 3.8   CO2 29 22    106   BUN 12 7   CREATININE 0.79 0.70   ALKPHOS 102  --    ALT 17  --    AST 17  --    BILITOT 0.7  --      Coagulation:   Recent Labs   Lab 12/13/22  0540   INR 1.14   APTT 20.0*     Recent Lab Results  (Last 5 results in the past 24 hours)        12/14/22  1649   12/14/22  1631   12/14/22  1531   12/14/22  1119   12/14/22  1001        HEMOGLOBIN     11.4           POC Glucose 176   67     104   67                                Significant Imaging:  Imaging results within the past 24 hours have been reviewed.

## 2022-12-14 NOTE — PT/OT/SLP EVAL
Physical Therapy Evaluation and Discharge Note    Patient Name:  Suzie Hennessy   MRN:  [id number]    Recommendations:     Discharge Recommendations: psychiatric facility  Discharge Equipment Recommendations:   unknown  Barriers to discharge:  impaired mental state    Assessment:     Suzie Hennessy is a 67 y.o. female admitted with a medical diagnosis of GI bleed. .  At this time, patient is functioning at their prior level of function and does not require further acute PT services.     Recent Surgery: * No surgery found *      Plan:     During this hospitalization, patient does not require further acute PT services.  Please re-consult if situation changes.      Subjective     Chief Complaint: GI bleed  Patient/Family Comments/goals: n/a  Pain/Comfort:  Pain Rating 1: 0/10    Patients cultural, spiritual, Islam conflicts given the current situation: no    Living Environment:  unknown  Prior to admission, patients level of function was unknown, pt reports that she is paralyzed but is able to resist all movement.  Equipment used at home:  (unknown).  DME owned (not currently used):  unknown .  Upon discharge, patient will have assistance from psychiatric facility.    Objective:     Communicated with Marshall Ramirez RN prior to session.  Patient found HOB elevated with PureWick, peripheral IV, telemetry upon PT entry to room.    General Precautions: Standard, fall    Orthopedic Precautions:    Braces: N/A  Respiratory Status: Room air    Exams:  Cognitive Exam:  Patient is oriented to Person  Sensation:    -       Intact  RLE ROM: refuses ROM  RLE Strength: Deficits: at least 3+/5 based on level of pt's resistance  LLE ROM: same as above  LLE Strength: same as above    Functional Mobility:  Bed Mobility:     Supine to Sit: total assistance  Sit to Supine: total assistance    AM-PAC 6 CLICK MOBILITY  Total Score:6       Treatment and Education:  PT evaluation completed. Pt is resistant to all forms of movement and  mobility and does not answer questions appropriately, follow directions or participate in any functional mobility assessments. No further PT warranted at this time.    AM-PAC 6 CLICK MOBILITY  Total Score:6     Patient left HOB elevated with all lines intact, call button in reach, and RN present.    GOALS:   Multidisciplinary Problems       Physical Therapy Goals       Not on file              Multidisciplinary Problems (Resolved)          Problem: Physical Therapy    Goal Priority Disciplines Outcome Goal Variances Interventions   Physical Therapy Goal   (Resolved)     PT, PT/OT Met     Description: PT evaluation completed with no further PT interventions at this time. Pt does not follow commands or participate in functional mobility.                       History:     Past Medical History:   Diagnosis Date    Constipation     Gastroesophageal reflux disease with esophagitis without hemorrhage 9/26/2021    GERD (gastroesophageal reflux disease)     Hematemesis with nausea 9/26/2021    Hyperlipidemia     Memory loss     Schizophrenia     Seizures     Ulcer of esophagus without bleeding 9/26/2021       Past Surgical History:   Procedure Laterality Date    EXTRACTION OF TOOTH N/A 4/25/2022    Procedure: EXTRACTION, TOOTH;  Surgeon: Rob Farris DMD, MD;  Location: [facility];  Service: Oral Surgery;  Laterality: N/A;       Time Tracking:     PT Received On: 12/14/22  PT Start Time: 1100     PT Stop Time: 1112  PT Total Time (min): 12 min     Billable Minutes: Evaluation low complexity      12/14/2022

## 2022-12-14 NOTE — PROGRESS NOTES
"[facility]  Adult Nutrition  First Assessment Note         Reason for Assessment  Reason For Assessment: identified at risk by screening criteria (MST 3)   Nutrition Risk Screen: difficulty chewing/swallowing (ST to eval)     Patient seen for MST. RD unable to perform NFPE due to patient would not give verbal permission. Patient would not respond to RD questioning. She would only stare at RD. Its noted she refused ST eval yesterday. She does have a dx of schizophrenia. She is currently NPO pending ST evaluation. Recommend advance diet as medically appropriate. Regular diet with ensure high protein recommended with consistency recommended by ST.    HPI: Pt is a 67 y.o. f . presenting with witnessed "coffee-ground emesis".  Pt cannot provide a hx; she appears to be lethargic and psychotic at the time of my exam and makes references to her grandmother and [place] "walking on the land and the water". She also reports that she "swam with Flipper (the dolphin)". According to nurse at bedside,  Pt had been vomiting over the weekend and it eventually became bloody.      Pt has a hx of schizophrenia, seizure disorder, dementia, HLD and GERD. According to a conversation with RN at  [facility], Pt has been institutionalized since her 20s. Of note, Pt was previously seen for a similar complaint in 9/2021. During that hospitalization, Pt underwent EGD by GI Dr. Menendez which revealed esophagitis with erythematous mucosa with erosion in the lower third of the esophagus and large hiatal hernia with no active bleeding seen.      Workup thus far is notable for the following: Initial vital signs within established limits. White blood cell count is mildly elevated at 11.38. UA was negative for infection. FOBT was (-). Acute abd x-ray was negative for obstruction. Ammonia was not elevated. Pt is Flu and Covid negative. In the ED, patient was started on LR and protonix infusion. GI was consulted     Malnutrition  Is " Patient Malnourished: No  Skin Integrity  Naveed Risk Assessment  Sensory Perception: 3-->slightly limited  Moisture: 3-->occasionally moist  Activity: 1-->bedfast  Mobility: 2-->very limited  Nutrition: 2-->probably inadequate  Friction and Shear: 3-->no apparent problem  Naveed Score: 14  Comments on skin integrity: no issues  Nutrition Diagnosis  Inadequate energy intake   related to NPO as evidenced by recent admit with refusal of ST eval    Nutrition Diagnosis Status: Chronic/ continues      Nutrition Risk  Level of Risk/Frequency of Follow-up: moderate - high  Comments on nutrition risk: NPO  Recent Labs   Lab 12/14/22  0654   GLU 62*     Comments on Glucose: no dx of diabetes  Nutrition Prescription / Recommendations  Recommendation/Intervention: Recommend advance diet per ST recommendations and as medically appropriate.  Goals: weight maintenance, intake adequate to meet needs  Nutrition Goal Status: new  Current Diet Order: NPO  Chewing or Swallowing Difficulty?: unknown  Recommended Diet: Regular  Recommended Oral Supplement: No Oral Supplements  Is Nutrition Support Recommended: No  Is Education Recommended: No  Monitor and Evaluation  % current Intake: NPO  % intake to meet estimated needs: 75 - 100 %  Food and Nutrient Intake: energy intake  Food and Nutrient Adminstration: diet order  Anthropometric Measurements: weight, weight change, body mass index  Biochemical Data, Medical Tests and Procedures: electrolyte and renal panel, glucose/endocrine profile, lipid profile, gastrointestinal profile, inflammatory profile  Nutrition-Focused Physical Findings: overall appearance, skin, extremities, muscles and bones, head and eyes     Current Medical Diagnosis and Past Medical History  Diagnosis: psychological disorder  Past Medical History:   Diagnosis Date    Constipation     Gastroesophageal reflux disease with esophagitis without hemorrhage 9/26/2021    GERD (gastroesophageal reflux disease)      "Hematemesis with nausea 9/26/2021    Hyperlipidemia     Memory loss     Schizophrenia     Seizures     Ulcer of esophagus without bleeding 9/26/2021     Nutrition/Diet History  Food Allergies: NKFA  Factors Affecting Nutritional Intake: NPO  Lab/Procedures/Meds  Recent Labs   Lab 12/12/22  2240 12/14/22  0654    143   K 3.8 3.8   BUN 12 7   CREATININE 0.79 0.70   CALCIUM 9.3 8.6   ALBUMIN 3.8  --     106   ALT 17  --    AST 17  --      Last A1c:   Lab Results   Component Value Date    HGBA1C 5.2 06/06/2022     Lab Results   Component Value Date    RBC 4.43 12/12/2022    HGB 12.3 12/14/2022    HCT 32 (L) 12/13/2022    MCV 96.6 (H) 12/12/2022    MCH 31.6 (H) 12/12/2022    MCHC 32.7 12/12/2022     Pertinent Labs Reviewed: reviewed  Pertinent Labs Comments: : 141 12/12/22 22:40  Potassium: 3.8 12/12/22 22:40  Chloride: 104 12/12/22 22:40  CO2: 29 12/12/22 22:40  ANION GAP: 12 12/12/22 22:40  BUN: 12 12/12/22 22:40  Creatinine: 0.79 12/12/22 22:40  BUN/CREAT RATIO: 15 12/12/22 22:40  eGFR: 81 12/12/22 22:40  Pertinent Medications Reviewed: reviewed  Pertinent Medications Comments: levetracetam  Anthropometrics  Temp: 99.3 °F (37.4 °C)  Height Method: Estimated  Height: 5' 7" (170.2 cm)  Height (inches): 67 in  Weight Method: Bed Scale  Weight: 49.5 kg (109 lb 3.2 oz)  Weight (lb): 109.2 lb  Ideal Body Weight (IBW), Female: 135 lb  % Ideal Body Weight, Female (lb): 80.89 %  BMI (Calculated): 17.1  BMI Grade: 17 - 18.4 protein-energy malnutrition grade I     Estimated/Assessed Needs      Temp: 99.3 °F (37.4 °C)Axillary  Weight Used For Calorie Calculations: 49.5 kg (109 lb 2 oz)   Energy Need Method: Kcal/kg Energy Calorie Requirements (kcal): 6093-3383  Weight Used For Protein Calculations: 49.5 kg (109 lb 2 oz)  Protein Requirements: 49-59  Estimated Fluid Requirement Method: RDA Method    RDA Method (mL): 1485     Nutrition by Nursing  Diet/Nutrition Received: NPO           Last Bowel Movement: " 12/12/22              Nutrition Follow-Up  RD Follow-up?: Yes

## 2022-12-15 ENCOUNTER — ANESTHESIA EVENT (OUTPATIENT)
Dept: GASTROENTEROLOGY | Facility: HOSPITAL | Age: 67
DRG: 381 | End: 2022-12-15
Payer: MEDICARE

## 2022-12-15 ENCOUNTER — ANESTHESIA (OUTPATIENT)
Dept: GASTROENTEROLOGY | Facility: HOSPITAL | Age: 67
DRG: 381 | End: 2022-12-15
Payer: MEDICARE

## 2022-12-15 PROBLEM — R63.0 ANOREXIA: Status: ACTIVE | Noted: 2022-12-15

## 2022-12-15 LAB
BASOPHILS # BLD AUTO: 0.02 K/UL (ref 0–0.2)
BASOPHILS NFR BLD AUTO: 0.4 % (ref 0–1)
DIFFERENTIAL METHOD BLD: ABNORMAL
EOSINOPHIL # BLD AUTO: 0.2 K/UL (ref 0–0.5)
EOSINOPHIL NFR BLD AUTO: 3.6 % (ref 1–4)
ERYTHROCYTE [DISTWIDTH] IN BLOOD BY AUTOMATED COUNT: 13 % (ref 11.5–14.5)
GLUCOSE SERPL-MCNC: 106 MG/DL (ref 70–105)
GLUCOSE SERPL-MCNC: 84 MG/DL (ref 70–105)
GLUCOSE SERPL-MCNC: 91 MG/DL (ref 70–105)
HCT VFR BLD AUTO: 34.4 % (ref 38–47)
HCT VFR BLD AUTO: 34.5 % (ref 38–47)
HCT VFR BLD AUTO: 34.7 % (ref 38–47)
HCT VFR BLD AUTO: 34.7 % (ref 38–47)
HCT VFR BLD AUTO: 34.9 % (ref 38–47)
HGB BLD-MCNC: 11.4 G/DL (ref 12–16)
HGB BLD-MCNC: 11.4 G/DL (ref 12–16)
HGB BLD-MCNC: 11.5 G/DL (ref 12–16)
HGB BLD-MCNC: 11.7 G/DL (ref 12–16)
HGB BLD-MCNC: 11.8 G/DL (ref 12–16)
IMM GRANULOCYTES # BLD AUTO: 0.01 K/UL (ref 0–0.04)
IMM GRANULOCYTES NFR BLD: 0.2 % (ref 0–0.4)
LYMPHOCYTES # BLD AUTO: 1.7 K/UL (ref 1–4.8)
LYMPHOCYTES NFR BLD AUTO: 31 % (ref 27–41)
MCH RBC QN AUTO: 31.8 PG (ref 27–31)
MCHC RBC AUTO-ENTMCNC: 33.1 G/DL (ref 32–36)
MCV RBC AUTO: 96.1 FL (ref 80–96)
MONOCYTES # BLD AUTO: 0.41 K/UL (ref 0–0.8)
MONOCYTES NFR BLD AUTO: 7.5 % (ref 2–6)
MPC BLD CALC-MCNC: 10.3 FL (ref 9.4–12.4)
NEUTROPHILS # BLD AUTO: 3.14 K/UL (ref 1.8–7.7)
NEUTROPHILS NFR BLD AUTO: 57.3 % (ref 53–65)
NRBC # BLD AUTO: 0 X10E3/UL
NRBC, AUTO (.00): 0 %
PLATELET # BLD AUTO: 205 K/UL (ref 150–400)
RBC # BLD AUTO: 3.58 M/UL (ref 4.2–5.4)
WBC # BLD AUTO: 5.48 K/UL (ref 4.5–11)

## 2022-12-15 PROCEDURE — 99232 PR SUBSEQUENT HOSPITAL CARE,LEVL II: ICD-10-PCS | Mod: GC,,, | Performed by: FAMILY MEDICINE

## 2022-12-15 PROCEDURE — 43235 EGD DIAGNOSTIC BRUSH WASH: CPT | Performed by: INTERNAL MEDICINE

## 2022-12-15 PROCEDURE — C9113 INJ PANTOPRAZOLE SODIUM, VIA: HCPCS | Performed by: INTERNAL MEDICINE

## 2022-12-15 PROCEDURE — 27000221 HC OXYGEN, UP TO 24 HOURS

## 2022-12-15 PROCEDURE — 25000003 PHARM REV CODE 250: Performed by: INTERNAL MEDICINE

## 2022-12-15 PROCEDURE — D9220A PRA ANESTHESIA: Mod: CRNA,,, | Performed by: NURSE ANESTHETIST, CERTIFIED REGISTERED

## 2022-12-15 PROCEDURE — 82962 GLUCOSE BLOOD TEST: CPT

## 2022-12-15 PROCEDURE — 94761 N-INVAS EAR/PLS OXIMETRY MLT: CPT

## 2022-12-15 PROCEDURE — 99232 SBSQ HOSP IP/OBS MODERATE 35: CPT | Mod: GC,,, | Performed by: FAMILY MEDICINE

## 2022-12-15 PROCEDURE — 63600175 PHARM REV CODE 636 W HCPCS: Performed by: HOSPITALIST

## 2022-12-15 PROCEDURE — 85014 HEMATOCRIT: CPT | Performed by: FAMILY MEDICINE

## 2022-12-15 PROCEDURE — S5010 5% DEXTROSE AND 0.45% SALINE: HCPCS | Performed by: FAMILY MEDICINE

## 2022-12-15 PROCEDURE — D9220A PRA ANESTHESIA: ICD-10-PCS | Mod: CRNA,,, | Performed by: NURSE ANESTHETIST, CERTIFIED REGISTERED

## 2022-12-15 PROCEDURE — 36415 COLL VENOUS BLD VENIPUNCTURE: CPT | Performed by: FAMILY MEDICINE

## 2022-12-15 PROCEDURE — 63600175 PHARM REV CODE 636 W HCPCS: Performed by: NURSE ANESTHETIST, CERTIFIED REGISTERED

## 2022-12-15 PROCEDURE — D9220A PRA ANESTHESIA: ICD-10-PCS | Mod: ANES,,, | Performed by: ANESTHESIOLOGY

## 2022-12-15 PROCEDURE — D9220A PRA ANESTHESIA: Mod: ANES,,, | Performed by: ANESTHESIOLOGY

## 2022-12-15 PROCEDURE — 11000001 HC ACUTE MED/SURG PRIVATE ROOM

## 2022-12-15 PROCEDURE — 25000003 PHARM REV CODE 250: Performed by: NURSE ANESTHETIST, CERTIFIED REGISTERED

## 2022-12-15 PROCEDURE — 63600175 PHARM REV CODE 636 W HCPCS: Performed by: INTERNAL MEDICINE

## 2022-12-15 PROCEDURE — 25000003 PHARM REV CODE 250: Performed by: FAMILY MEDICINE

## 2022-12-15 PROCEDURE — 85025 COMPLETE CBC W/AUTO DIFF WBC: CPT | Performed by: FAMILY MEDICINE

## 2022-12-15 PROCEDURE — 85018 HEMOGLOBIN: CPT | Performed by: FAMILY MEDICINE

## 2022-12-15 RX ORDER — PROPOFOL 10 MG/ML
VIAL (ML) INTRAVENOUS
Status: DISCONTINUED | OUTPATIENT
Start: 2022-12-15 | End: 2022-12-15

## 2022-12-15 RX ORDER — LIDOCAINE HYDROCHLORIDE 20 MG/ML
INJECTION INTRAVENOUS
Status: DISCONTINUED | OUTPATIENT
Start: 2022-12-15 | End: 2022-12-15

## 2022-12-15 RX ADMIN — LEVETIRACETAM 500 MG: 100 INJECTION, SOLUTION INTRAVENOUS at 10:12

## 2022-12-15 RX ADMIN — LIDOCAINE HYDROCHLORIDE 100 MG: 20 INJECTION, SOLUTION INTRAVENOUS at 03:12

## 2022-12-15 RX ADMIN — PANTOPRAZOLE SODIUM 8 MG/HR: 40 INJECTION, POWDER, LYOPHILIZED, FOR SOLUTION INTRAVENOUS at 04:12

## 2022-12-15 RX ADMIN — PROPOFOL 50 MG: 10 INJECTION, EMULSION INTRAVENOUS at 03:12

## 2022-12-15 RX ADMIN — DEXTROSE AND SODIUM CHLORIDE: 5; 450 INJECTION, SOLUTION INTRAVENOUS at 04:12

## 2022-12-15 RX ADMIN — PANTOPRAZOLE SODIUM 8 MG/HR: 40 INJECTION, POWDER, LYOPHILIZED, FOR SOLUTION INTRAVENOUS at 10:12

## 2022-12-15 NOTE — PT/OT/SLP PROGRESS
Occupational Therapy      Patient Name:  Suzie Hennessy   MRN:  [id number]    Patient not seen today secondary to Off the floor for procedure/surgery (Pt leaving for GI upon OT arrival). Will follow-up 12/16/2022.    12/15/2022

## 2022-12-15 NOTE — PROGRESS NOTES
GEN SURGERY CONSULTED FOR PEG    Dr. Mccallum, GI plans for PEG with EGD  Call surgery prn if GI unable to do peg

## 2022-12-15 NOTE — ANESTHESIA POSTPROCEDURE EVALUATION
Anesthesia Post Evaluation    Patient: Suzie Hennessy    Procedure(s) Performed: * No procedures listed *    Final Anesthesia Type: general      Patient location during evaluation: PACU  Patient participation: Yes- Able to Participate  Level of consciousness: awake and sedated  Post-procedure vital signs: reviewed and stable  Pain management: adequate  Airway patency: patent    PONV status at discharge: No PONV  Anesthetic complications: no      Cardiovascular status: blood pressure returned to baseline  Respiratory status: unassisted  Hydration status: euvolemic  Follow-up not needed.          Vitals Value Taken Time   /65 12/15/22 1610   Temp 35.9 °C (96.6 °F) 12/15/22 1641   Pulse 71 12/15/22 1610   Resp 17 12/15/22 1610   SpO2 95 % 12/15/22 1615         Event Time   Out of Recovery 12/15/2022 16:04:57         Pain/Fei Score: Fei Score: 9 (12/15/2022  3:46 PM)

## 2022-12-15 NOTE — PLAN OF CARE
Problem: Infection  Goal: Absence of Infection Signs and Symptoms  Outcome: Ongoing, Progressing  Intervention: Prevent or Manage Infection  Flowsheets (Taken 12/15/2022 0155)  Fever Reduction/Comfort Measures: lightweight clothing     Problem: Adult Inpatient Plan of Care  Goal: Plan of Care Review  Outcome: Ongoing, Progressing  Flowsheets (Taken 12/15/2022 0155)  Plan of Care Reviewed With: patient  Goal: Patient-Specific Goal (Individualized)  Outcome: Ongoing, Progressing  Goal: Absence of Hospital-Acquired Illness or Injury  Outcome: Ongoing, Progressing  Intervention: Prevent Skin Injury  Flowsheets (Taken 12/15/2022 0155)  Body Position: weight shifting  Intervention: Prevent and Manage VTE (Venous Thromboembolism) Risk  Flowsheets (Taken 12/15/2022 0155)  Activity Management: Rolling - L1  VTE Prevention/Management: remove, assess skin, and reapply sequential compression device  Range of Motion:   active ROM (range of motion) encouraged   ROM (range of motion) performed  Goal: Optimal Comfort and Wellbeing  Outcome: Ongoing, Progressing  Intervention: Monitor Pain and Promote Comfort  Flowsheets (Taken 12/15/2022 0155)  Pain Management Interventions: care clustered  Goal: Readiness for Transition of Care  Outcome: Ongoing, Progressing     Problem: Skin Injury Risk Increased  Goal: Skin Health and Integrity  Outcome: Ongoing, Progressing

## 2022-12-15 NOTE — TRANSFER OF CARE
"Anesthesia Transfer of Care Note    Patient: Suzie Hennessy    Procedure(s) Performed: * No procedures listed *    Patient location: GI    Anesthesia Type: general    Transport from OR: Transported from OR on room air with adequate spontaneous ventilation    Post pain: adequate analgesia    Post assessment: no apparent anesthetic complications    Post vital signs: stable    Level of consciousness: responds to stimulation    Nausea/Vomiting: no nausea/vomiting    Complications: none    Transfer of care protocol was followed      Last vitals:   Visit Vitals  /73 (BP Location: Right arm, Patient Position: Lying)   Pulse 78   Temp 37 °C (98.6 °F) (Oral)   Resp 18   Ht 5' 7" (1.702 m)   Wt 49.5 kg (109 lb 3.2 oz)   SpO2 97%   Breastfeeding No   BMI 17.10 kg/m²     "

## 2022-12-15 NOTE — ASSESSMENT & PLAN NOTE
GI consulted; EGD planned for today  H/H stable   Continue to monitor BP  Protonix and D5 1/2 NS infusion   NPO

## 2022-12-15 NOTE — ANESTHESIA PREPROCEDURE EVALUATION
12/15/2022  Suzie Hennessy is a 67 y.o., female.      Pre-op Assessment    I have reviewed the Patient Summary Reports.     I have reviewed the Nursing Notes. I have reviewed the NPO Status.   I have reviewed the Medications.     Review of Systems  Anesthesia Hx:  Personal Hx of Anesthesia complications   Hepatic/GI:   PUD, GERD Gi bleed   Neurological:   Seizures    Psych:   Psychiatric History depression  Psychotic Disorder and Schizophrenia.              Physical Exam  General: Alert and Lethargic    Airway:  Mallampati: II / II  Mouth Opening: Normal  TM Distance: Normal  Tongue: Normal  Neck ROM: Normal ROM    Dental:  Edentulous    Chest/Lungs:  Clear to auscultation, Normal Respiratory Rate    Heart:  Rate: Normal  Rhythm: Regular Rhythm        Anesthesia Plan  Type of Anesthesia, risks & benefits discussed:    Anesthesia Type: Gen Natural Airway, MAC  Intra-op Monitoring Plan: Standard ASA Monitors  Post Op Pain Control Plan: multimodal analgesia  Induction:  IV  Informed Consent: Informed consent signed with the Patient and all parties understand the risks and agree with anesthesia plan.  All questions answered. Patient consented to blood products? Yes  ASA Score: 3 Emergent  Day of Surgery Review of History & Physical: H&P Update referred to the surgeon/provider.I have interviewed and examined the patient. I have reviewed the patient's H&P dated: There are no significant changes. H&P completed by Anesthesiologist.    Ready For Surgery From Anesthesia Perspective.     .

## 2022-12-15 NOTE — SUBJECTIVE & OBJECTIVE
Interval History: On examination, pt is resting comfortably in bed. NAD. Pt does not respond to any of my questions but does look at me and responds when asked if she would like the blinds to be closed.     Spoke with sister today and she states she would like comfort care per patients wishes. Will start IV PPI. Sister would like patient to go back to [facility] with hospice. SS consulted.     Review of Systems   Unable to perform ROS: Other (Pt refuses to speak except when she needs something.)   Objective:     Vital Signs (Most Recent):  Temp: 98.1 °F (36.7 °C) (12/16/22 1649)  Pulse: 74 (12/16/22 1649)  Resp: 20 (12/16/22 1649)  BP: 101/66 (12/16/22 1649)  SpO2: 98 % (12/16/22 1649)   Vital Signs (24h Range):  Temp:  [98 °F (36.7 °C)-100.3 °F (37.9 °C)] 98.1 °F (36.7 °C)  Pulse:  [34-74] 74  Resp:  [18-20] 20  SpO2:  [97 %-98 %] 98 %  BP: ()/(53-68) 101/66     Weight: 49.5 kg (109 lb 2 oz)  Body mass index is 17.09 kg/m².    Intake/Output Summary (Last 24 hours) at 12/16/2022 1843  Last data filed at 12/16/2022 0735  Gross per 24 hour   Intake 68.67 ml   Output 1900 ml   Net -1831.33 ml        Physical Exam  Constitutional:       Appearance: She is ill-appearing.   HENT:      Head: Normocephalic and atraumatic.      Right Ear: External ear normal.      Left Ear: External ear normal.      Nose: Nose normal.   Eyes:      General:         Right eye: No discharge.         Left eye: No discharge.      Extraocular Movements: Extraocular movements intact.      Conjunctiva/sclera: Conjunctivae normal.      Pupils: Pupils are equal, round, and reactive to light.   Cardiovascular:      Rate and Rhythm: Normal rate and regular rhythm.   Pulmonary:      Effort: Pulmonary effort is normal.   Abdominal:      General: Abdomen is flat. Bowel sounds are normal.      Palpations: Abdomen is soft.   Skin:     General: Skin is warm and dry.      Coloration: Skin is not jaundiced.      Findings: No bruising or lesion.    Neurological:      Mental Status: She is lethargic.   Psychiatric:         Behavior: Behavior is slowed and withdrawn.       Significant Labs: All pertinent labs within the past 24 hours have been reviewed.  Recent Lab Results  (Last 5 results in the past 24 hours)        12/16/22  1549   12/16/22  1534   12/16/22  0750   12/16/22  0706   12/16/22  0419        Baso #         0.01       Basophil %         0.1       Differential Type         Auto       Eos #         0.15       Eosinophil %         1.7       HEMATOCRIT   36.7   36.6     35.6       HEMOGLOBIN   12.3   12.2     11.8       Immature Grans (Abs)         0.03       Immature Granulocytes         0.3       Lymph #         1.19       Lymph %         13.2       MCH         31.8       MCHC         33.1       MCV         96.0       Mono #         0.57       Mono %         6.3       MPV         10.2       Neutrophils, Abs         7.06       Neutrophils Relative         78.4       nRBC         0.0       NUCLEATED RBC ABSOLUTE         0.00       Platelets         200       POC Glucose 114       104         RBC         3.71       RDW         12.6       WBC         9.01                              Significant Imaging: I have reviewed all pertinent imaging results/findings within the past 24 hours.

## 2022-12-15 NOTE — NURSING
At 1555 Report is called to Ida JOHNSON, found ulcerative colitis and Hiatal hernia during the EGD, the pt's Allie refused the peg tube placement.

## 2022-12-15 NOTE — NURSING
At 1330 pt is taken to GI lab per GI RN per bear, suction to purewick is stopped at this time, brief to pt is left, pt has no teeth and no dentures.

## 2022-12-15 NOTE — ASSESSMENT & PLAN NOTE
Pt refuses to eat and take po meds  Gen Surg consult placed for possible PEG tube placement; assistance appreciated

## 2022-12-15 NOTE — DISCHARGE INSTRUCTIONS
Procedure Date  12/15/22     Impression  Overall Impression: 1. LA classification grade D ulcerative esophagitis- low risk of further bleeding. Probably related to PPI noncompliance  2. Large hiatal hernia     Recommendation    Follow up with PCP      Twice daily PPI.  Discussed with her sister who has power of  and she wants comfort care with no PEG tube.  I will sign off. Please call if needed.
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
## 2022-12-16 PROBLEM — R63.0 ANOREXIA: Chronic | Status: ACTIVE | Noted: 2022-12-15

## 2022-12-16 PROBLEM — K21.9 GERD (GASTROESOPHAGEAL REFLUX DISEASE): Status: RESOLVED | Noted: 2021-09-25 | Resolved: 2022-12-16

## 2022-12-16 PROBLEM — K21.00 GASTROESOPHAGEAL REFLUX DISEASE WITH ESOPHAGITIS WITHOUT HEMORRHAGE: Chronic | Status: ACTIVE | Noted: 2021-09-26

## 2022-12-16 PROBLEM — F20.9 SCHIZOPHRENIA, UNSPECIFIED: Chronic | Status: ACTIVE | Noted: 2022-12-13

## 2022-12-16 PROBLEM — R41.82 ALTERED MENTAL STATE: Status: RESOLVED | Noted: 2022-12-13 | Resolved: 2022-12-16

## 2022-12-16 PROBLEM — R41.82 ALTERED MENTAL STATE: Chronic | Status: ACTIVE | Noted: 2022-12-13

## 2022-12-16 LAB
BASOPHILS # BLD AUTO: 0.01 K/UL (ref 0–0.2)
BASOPHILS NFR BLD AUTO: 0.1 % (ref 0–1)
DIFFERENTIAL METHOD BLD: ABNORMAL
EOSINOPHIL # BLD AUTO: 0.15 K/UL (ref 0–0.5)
EOSINOPHIL NFR BLD AUTO: 1.7 % (ref 1–4)
ERYTHROCYTE [DISTWIDTH] IN BLOOD BY AUTOMATED COUNT: 12.6 % (ref 11.5–14.5)
GLUCOSE SERPL-MCNC: 104 MG/DL (ref 70–105)
GLUCOSE SERPL-MCNC: 114 MG/DL (ref 70–105)
HCT VFR BLD AUTO: 35.6 % (ref 38–47)
HCT VFR BLD AUTO: 36.6 % (ref 38–47)
HCT VFR BLD AUTO: 36.7 % (ref 38–47)
HGB BLD-MCNC: 11.8 G/DL (ref 12–16)
HGB BLD-MCNC: 12.2 G/DL (ref 12–16)
HGB BLD-MCNC: 12.3 G/DL (ref 12–16)
IMM GRANULOCYTES # BLD AUTO: 0.03 K/UL (ref 0–0.04)
IMM GRANULOCYTES NFR BLD: 0.3 % (ref 0–0.4)
LYMPHOCYTES # BLD AUTO: 1.19 K/UL (ref 1–4.8)
LYMPHOCYTES NFR BLD AUTO: 13.2 % (ref 27–41)
MCH RBC QN AUTO: 31.8 PG (ref 27–31)
MCHC RBC AUTO-ENTMCNC: 33.1 G/DL (ref 32–36)
MCV RBC AUTO: 96 FL (ref 80–96)
MONOCYTES # BLD AUTO: 0.57 K/UL (ref 0–0.8)
MONOCYTES NFR BLD AUTO: 6.3 % (ref 2–6)
MPC BLD CALC-MCNC: 10.2 FL (ref 9.4–12.4)
NEUTROPHILS # BLD AUTO: 7.06 K/UL (ref 1.8–7.7)
NEUTROPHILS NFR BLD AUTO: 78.4 % (ref 53–65)
NRBC # BLD AUTO: 0 X10E3/UL
NRBC, AUTO (.00): 0 %
PLATELET # BLD AUTO: 200 K/UL (ref 150–400)
RBC # BLD AUTO: 3.71 M/UL (ref 4.2–5.4)
WBC # BLD AUTO: 9.01 K/UL (ref 4.5–11)

## 2022-12-16 PROCEDURE — 25000003 PHARM REV CODE 250: Performed by: INTERNAL MEDICINE

## 2022-12-16 PROCEDURE — S5010 5% DEXTROSE AND 0.45% SALINE: HCPCS | Performed by: FAMILY MEDICINE

## 2022-12-16 PROCEDURE — 63600175 PHARM REV CODE 636 W HCPCS: Performed by: HOSPITALIST

## 2022-12-16 PROCEDURE — 85018 HEMOGLOBIN: CPT | Performed by: FAMILY MEDICINE

## 2022-12-16 PROCEDURE — C9113 INJ PANTOPRAZOLE SODIUM, VIA: HCPCS | Performed by: FAMILY MEDICINE

## 2022-12-16 PROCEDURE — 99232 PR SUBSEQUENT HOSPITAL CARE,LEVL II: ICD-10-PCS | Mod: GC,,, | Performed by: FAMILY MEDICINE

## 2022-12-16 PROCEDURE — 85014 HEMATOCRIT: CPT | Performed by: FAMILY MEDICINE

## 2022-12-16 PROCEDURE — 99232 SBSQ HOSP IP/OBS MODERATE 35: CPT | Mod: GC,,, | Performed by: FAMILY MEDICINE

## 2022-12-16 PROCEDURE — 36415 COLL VENOUS BLD VENIPUNCTURE: CPT | Performed by: FAMILY MEDICINE

## 2022-12-16 PROCEDURE — 11000001 HC ACUTE MED/SURG PRIVATE ROOM

## 2022-12-16 PROCEDURE — 85025 COMPLETE CBC W/AUTO DIFF WBC: CPT | Performed by: FAMILY MEDICINE

## 2022-12-16 PROCEDURE — 94761 N-INVAS EAR/PLS OXIMETRY MLT: CPT

## 2022-12-16 PROCEDURE — 25000003 PHARM REV CODE 250: Performed by: FAMILY MEDICINE

## 2022-12-16 PROCEDURE — 63600175 PHARM REV CODE 636 W HCPCS: Performed by: INTERNAL MEDICINE

## 2022-12-16 PROCEDURE — C9113 INJ PANTOPRAZOLE SODIUM, VIA: HCPCS | Performed by: INTERNAL MEDICINE

## 2022-12-16 PROCEDURE — 63600175 PHARM REV CODE 636 W HCPCS: Performed by: FAMILY MEDICINE

## 2022-12-16 PROCEDURE — 82962 GLUCOSE BLOOD TEST: CPT

## 2022-12-16 RX ORDER — PANTOPRAZOLE SODIUM 40 MG/10ML
40 INJECTION, POWDER, LYOPHILIZED, FOR SOLUTION INTRAVENOUS 2 TIMES DAILY
Status: DISCONTINUED | OUTPATIENT
Start: 2022-12-16 | End: 2022-12-19 | Stop reason: HOSPADM

## 2022-12-16 RX ADMIN — LEVETIRACETAM 500 MG: 100 INJECTION, SOLUTION INTRAVENOUS at 10:12

## 2022-12-16 RX ADMIN — DEXTROSE AND SODIUM CHLORIDE: 5; 450 INJECTION, SOLUTION INTRAVENOUS at 04:12

## 2022-12-16 RX ADMIN — PANTOPRAZOLE SODIUM 40 MG: 40 INJECTION, POWDER, LYOPHILIZED, FOR SOLUTION INTRAVENOUS at 10:12

## 2022-12-16 RX ADMIN — PANTOPRAZOLE SODIUM 8 MG/HR: 40 INJECTION, POWDER, LYOPHILIZED, FOR SOLUTION INTRAVENOUS at 04:12

## 2022-12-16 RX ADMIN — LEVETIRACETAM 500 MG: 100 INJECTION, SOLUTION INTRAVENOUS at 09:12

## 2022-12-16 RX ADMIN — PANTOPRAZOLE SODIUM 8 MG/HR: 40 INJECTION, POWDER, LYOPHILIZED, FOR SOLUTION INTRAVENOUS at 09:12

## 2022-12-16 RX ADMIN — DEXTROSE AND SODIUM CHLORIDE: 5; 450 INJECTION, SOLUTION INTRAVENOUS at 02:12

## 2022-12-16 NOTE — NURSING
At 1426 Dr. Farr called me to see where the pt is located, I informed her that the pt should be in the room and she said that it is showing that the pt is in the GI lab, I went to room at 1428 and the pt is in room, a new bag of d51/2ns is started and pt is repositioned, I then transferred the pt back to room per unit manager in the computer.

## 2022-12-16 NOTE — PROGRESS NOTES
"[facility]  Adult Nutrition  Follow Up Note    SUMMARY       Recommendations    Recommendation/Intervention: Pt POA refused PEG placement, interested in comfort measures, will discuss per MD note. If more aggressive nutrition intervention desired, please consult for recommendations.  Goals: weight maintenance  Nutrition Goal Status: progressing towards goal    Assessment and Plan  Follow up. Current weight is 109 lbs. Pt remains NPO, not adequate to meet nutritional needs. Per MD note, POA refused PEG placement, interested in comfort measures. If more aggressive nutrition intervention desired, please consult for recommendations.     Last BM 12/12 per flowsheets. Labs/meds reviewed. Will continue to follow and provide recommendations that coincide with plan of care and goals of care.     Nutrition Diagnosis  Inadequate energy intake   related to NPO as evidenced by recent admit with refusal of ST eval     Nutrition Diagnosis Status: Chronic/ continues    Reason for Assessment    Reason For Assessment: RD follow-up  Diagnosis: psychological disorder  Relevant Medical History: convulsions, GERS, schizophrenia, AMS    Nutrition Risk Screen    Nutrition Risk Screen: reduced oral intake over the last month    Nutrition/Diet History    Spiritual, Cultural Beliefs, Congregational Practices, Values that Affect Care: no  Food Allergies: NKFA  Factors Affecting Nutritional Intake: NPO    Anthropometrics    Temp: 98.1 °F (36.7 °C)  Height Method: Estimated  Height: 5' 7" (170.2 cm)  Height (inches): 67 in  Weight Method: Bed Scale  Weight: 49.5 kg (109 lb 2 oz)  Weight (lb): 109.13 lb  Ideal Body Weight (IBW), Female: 135 lb  % Ideal Body Weight, Female (lb): 80.89 %  BMI (Calculated): 17.1  BMI Grade: 17 - 18.4 protein-energy malnutrition grade I       Lab/Procedures/Meds   Latest Reference Range & Units 12/14/22 06:54   Sodium 136 - 145 mmol/L 143   Potassium 3.5 - 5.1 mmol/L 3.8   Chloride 98 - 107 mmol/L " 106   CO2 21 - 32 mmol/L 22   ANION GAP 7 - 16 mmol/L 19 (H)   BUN 7 - 18 mg/dL 7   Creatinine 0.55 - 1.02 mg/dL 0.70   BUN/CREAT RATIO 6 - 20  10   eGFR >=60 mL/min/1.73m² 94   Glucose 74 - 106 mg/dL 62 (L)   Calcium 8.5 - 10.1 mg/dL 8.6   (H): Data is abnormally high  (L): Data is abnormally low    Note: Low glucose.      Pertinent Labs Reviewed: reviewed  Pertinent Medications Reviewed: reviewed  Pertinent Medications Comments: levetiracetam    Estimated/Assessed Needs    Weight Used For Calorie Calculations: 61 kg (134 lb 7.7 oz)  Energy Calorie Requirements (kcal): 9029-5906 kcals/day (25-30 kcals/kg IBW)  Energy Need Method: Kcal/kg  Protein Requirements: 61-73 g/day (1.0-1.2 g/kg IBW)  Weight Used For Protein Calculations: 61 kg (134 lb 7.7 oz)     Estimated Fluid Requirement Method: RDA Method  RDA Method (mL): 1525         Nutrition Prescription Ordered    Current Diet Order: NPO  Nutrition Order Comments: not adequate    Evaluation of Received Nutrient/Fluid Intake       % Intake of Estimated Energy Needs: 0 - 25 %  % Meal Intake: NPO    Nutrition Risk    Level of Risk/Frequency of Follow-up: high     Monitor and Evaluation    Food and Nutrient Intake: energy intake  Food and Nutrient Adminstration: diet order  Anthropometric Measurements: weight, weight change, body mass index  Biochemical Data, Medical Tests and Procedures: electrolyte and renal panel, glucose/endocrine profile, lipid profile, gastrointestinal profile, inflammatory profile  Nutrition-Focused Physical Findings: overall appearance, skin, extremities, muscles and bones, head and eyes     Nutrition Follow-Up    RD Follow-up?: Yes

## 2022-12-17 PROCEDURE — 63600175 PHARM REV CODE 636 W HCPCS: Performed by: HOSPITALIST

## 2022-12-17 PROCEDURE — C9113 INJ PANTOPRAZOLE SODIUM, VIA: HCPCS | Performed by: FAMILY MEDICINE

## 2022-12-17 PROCEDURE — S0166 INJ OLANZAPINE 2.5MG: HCPCS | Performed by: FAMILY MEDICINE

## 2022-12-17 PROCEDURE — 99232 SBSQ HOSP IP/OBS MODERATE 35: CPT | Mod: GC,,, | Performed by: FAMILY MEDICINE

## 2022-12-17 PROCEDURE — 25000003 PHARM REV CODE 250: Performed by: FAMILY MEDICINE

## 2022-12-17 PROCEDURE — 99232 PR SUBSEQUENT HOSPITAL CARE,LEVL II: ICD-10-PCS | Mod: GC,,, | Performed by: FAMILY MEDICINE

## 2022-12-17 PROCEDURE — 63600175 PHARM REV CODE 636 W HCPCS: Performed by: FAMILY MEDICINE

## 2022-12-17 PROCEDURE — 11000001 HC ACUTE MED/SURG PRIVATE ROOM

## 2022-12-17 PROCEDURE — S5010 5% DEXTROSE AND 0.45% SALINE: HCPCS | Performed by: FAMILY MEDICINE

## 2022-12-17 RX ORDER — OLANZAPINE 10 MG/2ML
5 INJECTION, POWDER, FOR SOLUTION INTRAMUSCULAR ONCE
Status: COMPLETED | OUTPATIENT
Start: 2022-12-17 | End: 2022-12-17

## 2022-12-17 RX ADMIN — DEXTROSE AND SODIUM CHLORIDE: 5; 450 INJECTION, SOLUTION INTRAVENOUS at 10:12

## 2022-12-17 RX ADMIN — DEXTROSE AND SODIUM CHLORIDE: 5; 450 INJECTION, SOLUTION INTRAVENOUS at 01:12

## 2022-12-17 RX ADMIN — LEVETIRACETAM 500 MG: 100 INJECTION, SOLUTION INTRAVENOUS at 10:12

## 2022-12-17 RX ADMIN — PANTOPRAZOLE SODIUM 40 MG: 40 INJECTION, POWDER, LYOPHILIZED, FOR SOLUTION INTRAVENOUS at 10:12

## 2022-12-17 RX ADMIN — OLANZAPINE 5 MG: 10 INJECTION, POWDER, FOR SOLUTION INTRAMUSCULAR at 05:12

## 2022-12-17 NOTE — ASSESSMENT & PLAN NOTE
-GI consulted; EGD showed ulcerative esophagitis with low risk of bleeding   -H/H stable 12/36  -Continue to monitor BP  -Protonix IV BID  -NPO  -D5 1/2 NS at 100 cc/ht

## 2022-12-17 NOTE — PLAN OF CARE
Problem: Infection  Goal: Absence of Infection Signs and Symptoms  Outcome: Ongoing, Not Progressing     Problem: Adult Inpatient Plan of Care  Goal: Plan of Care Review  Outcome: Ongoing, Not Progressing  Goal: Patient-Specific Goal (Individualized)  Outcome: Ongoing, Not Progressing  Goal: Absence of Hospital-Acquired Illness or Injury  Outcome: Ongoing, Not Progressing  Goal: Optimal Comfort and Wellbeing  Outcome: Ongoing, Not Progressing  Goal: Readiness for Transition of Care  Outcome: Ongoing, Not Progressing     Problem: Skin Injury Risk Increased  Goal: Skin Health and Integrity  Outcome: Ongoing, Not Progressing

## 2022-12-17 NOTE — PROGRESS NOTES
"[facility] Medicine  Progress Note    Patient Name: Suzie Hennessy  MRN: [id number]  Patient Class: IP- Inpatient   Admission Date: 12/12/2022  Length of Stay: 4 days  Attending Physician: Javed Serrato Jr., MD  Primary Care Provider: Primary Doctor No        Subjective:     Principal Problem:Gastroesophageal reflux disease with esophagitis without hemorrhage        HPI:  Pt is a 67 y.o. f . presenting with witnessed "coffee-ground emesis".  Pt cannot provide a hx; she appears to be psychotic at the time of my exam and makes references to her grandmother and Kendrick "walking on the land and the water". She also reports that she "swam with Flipper (the dolphin)". According to nurse at bedside,  Pt had been vomiting over the weekend and it eventually became bloody.     Pt has a hx of schizophrenia, seizure disorder, dementia, HLD and GERD. According to a conversation with RN at [facility], Pt has been institutionalized since her 20s. Of note, Pt was previously seen for a similar complaint in 9/2021. During that hospitalization, Pt underwent EGD by GI Dr. Menendez which revealed esophagitis with erythematous mucosa with erosion in the lower third of the esophagus and large hiatal hernia with no active bleeding seen.     Workup thus far is notable for the following: Initial vital sings within established limits. White blood cell count is mildly elevated at 11.38. UA was negative for infection. FOBT was (-). Acute abd x-ray was negative for obstruction. Pt is Flu and Covid negative. In the ED, patient was started on LR and protonix infusion. GI was consulted from the ED.     Pt is being admitted to the hospital medicine service under attending Javed Serrato for the evaluation and management of acute GI bleed.       Overview/Hospital Course:  No notes on file    Interval History: Pt examined at bedside this am. She is resting comfortbaly in bed, in no acute distress. She is wake and alert but " mutters incomprehensibly in response to questions.     Pt cannot be transferred back to [facility] over the weekend as there is no doctor there at present. Will likely d/c on Monday.     Attempts to contact this facility today were unsuccessful.     IM Zyprexa ordered for psychosis. Will continue to monitor.     Review of Systems   Unable to perform ROS: Psychiatric disorder   Objective:     Vital Signs (Most Recent):  Temp: 98.2 °F (36.8 °C) (12/17/22 1239)  Pulse: 66 (12/17/22 1239)  Resp: 18 (12/17/22 1239)  BP: 110/68 (12/17/22 1239)  SpO2: 95 % (12/17/22 0633) Vital Signs (24h Range):  Temp:  [98.1 °F (36.7 °C)-99.8 °F (37.7 °C)] 98.2 °F (36.8 °C)  Pulse:  [32-74] 66  Resp:  [18-20] 18  SpO2:  [94 %-98 %] 95 %  BP: ()/(44-74) 110/68     Weight: 49.5 kg (109 lb 2 oz)  Body mass index is 17.09 kg/m².    Intake/Output Summary (Last 24 hours) at 12/17/2022 1555  Last data filed at 12/17/2022 0617  Gross per 24 hour   Intake 465 ml   Output 650 ml   Net -185 ml      Physical Exam  Constitutional:       Appearance: She is ill-appearing.   HENT:      Head: Normocephalic and atraumatic.      Right Ear: External ear normal.      Left Ear: External ear normal.      Nose: Nose normal.   Eyes:      General:         Right eye: No discharge.         Left eye: No discharge.      Extraocular Movements: Extraocular movements intact.      Conjunctiva/sclera: Conjunctivae normal.      Pupils: Pupils are equal, round, and reactive to light.   Cardiovascular:      Rate and Rhythm: Normal rate and regular rhythm.   Pulmonary:      Effort: Pulmonary effort is normal.   Abdominal:      General: Abdomen is flat. Bowel sounds are normal.      Palpations: Abdomen is soft.   Skin:     General: Skin is warm and dry.      Coloration: Skin is not jaundiced.      Findings: No bruising or lesion.   Neurological:      Mental Status: She is lethargic.   Psychiatric:         Attention and Perception: She perceives visual hallucinations.          Speech: Speech is delayed and tangential.         Behavior: Behavior is slowed and withdrawn.       Significant Labs: All pertinent labs within the past 24 hours have been reviewed.    Significant Imaging: I have reviewed all pertinent imaging results/findings within the past 24 hours.      Assessment/Plan:      * Gastroesophageal reflux disease with esophagitis without hemorrhage  IV protonix BID per GI recommendations    Anorexia  Sister consulted; PEG placement refused  Speech therapy re-consulted for swallow eval as she appears somewhat more cooperative; recs appreciated   Comfort care measures       Schizophrenia, unspecified  Pt takes Invega- Sustenna 117 mg/0.75 mL Syrg injection every 30days   Will attempt to verify when last dose was given   Zyprexa IM x 1 dose given today       GI bleed  GI consulted; EGD showed ulcerative esophagitis with low risk of bleeding   H/H stable Continue to monitor BP  Protonix IV BID  D5 1/2 NS at 100 cc/ht    Convulsions  Continue Levitiracetam 1000mg bid      VTE Risk Mitigation (From admission, onward)         Ordered     Place sequential compression device  Until discontinued         12/13/22 0537     Reason for No Pharmacological VTE Prophylaxis  Once        Question:  Reasons:  Answer:  Active Bleeding    12/13/22 0537     IP VTE LOW RISK PATIENT  Once         12/13/22 0537                Discharge Planning   AGUSTINA:      Code Status: DNR   Is the patient medically ready for discharge?:     Reason for patient still in hospital (select all that apply): Pending disposition  Discharge Plan A: Return to nursing home                  Gurdeep Montalvo DO  Department of Hospital Medicine   [facility]

## 2022-12-17 NOTE — PT/OT/SLP PROGRESS
Occupational Therapy      Patient Name:  Suzie Hennessy   MRN:  [id number]    Patient not seen today secondary to Other (Comment) (Pt was lying in bed watching TV upon OT arrival. OT spoke to pt and pt breifly looked at OT, but othersie did not respond. OT continued to encourage pt to engage and pt just cloed her eyes and refused to move or interact with OT.). Will follow-up 12/19/2022.    12/16/2022

## 2022-12-17 NOTE — SUBJECTIVE & OBJECTIVE
Interval History: Pt examined at bedside this am. She is resting comfortbaly in bed, in no acute distress. She is wake and alert but mutters incomprehensibly in response to questions.     Pt cannot be transferred back to [facility] over the weekend as there is no doctor there at present. Will likely d/c on Monday.     Attempts to contact this facility today were unsuccessful.     IM Zyprexa ordered for psychosis. Will continue to monitor.     Review of Systems   Unable to perform ROS: Psychiatric disorder   Objective:     Vital Signs (Most Recent):  Temp: 98.2 °F (36.8 °C) (12/17/22 1239)  Pulse: 66 (12/17/22 1239)  Resp: 18 (12/17/22 1239)  BP: 110/68 (12/17/22 1239)  SpO2: 95 % (12/17/22 0633) Vital Signs (24h Range):  Temp:  [98.1 °F (36.7 °C)-99.8 °F (37.7 °C)] 98.2 °F (36.8 °C)  Pulse:  [32-74] 66  Resp:  [18-20] 18  SpO2:  [94 %-98 %] 95 %  BP: ()/(44-74) 110/68     Weight: 49.5 kg (109 lb 2 oz)  Body mass index is 17.09 kg/m².    Intake/Output Summary (Last 24 hours) at 12/17/2022 1555  Last data filed at 12/17/2022 0617  Gross per 24 hour   Intake 465 ml   Output 650 ml   Net -185 ml      Physical Exam  Constitutional:       Appearance: She is ill-appearing.   HENT:      Head: Normocephalic and atraumatic.      Right Ear: External ear normal.      Left Ear: External ear normal.      Nose: Nose normal.   Eyes:      General:         Right eye: No discharge.         Left eye: No discharge.      Extraocular Movements: Extraocular movements intact.      Conjunctiva/sclera: Conjunctivae normal.      Pupils: Pupils are equal, round, and reactive to light.   Cardiovascular:      Rate and Rhythm: Normal rate and regular rhythm.   Pulmonary:      Effort: Pulmonary effort is normal.   Abdominal:      General: Abdomen is flat. Bowel sounds are normal.      Palpations: Abdomen is soft.   Skin:     General: Skin is warm and dry.      Coloration: Skin is not jaundiced.      Findings: No bruising or lesion.    Neurological:      Mental Status: She is lethargic.   Psychiatric:         Attention and Perception: She perceives visual hallucinations.         Speech: Speech is delayed and tangential.         Behavior: Behavior is slowed and withdrawn.       Significant Labs: All pertinent labs within the past 24 hours have been reviewed.    Significant Imaging: I have reviewed all pertinent imaging results/findings within the past 24 hours.

## 2022-12-17 NOTE — ASSESSMENT & PLAN NOTE
GI consulted; EGD showed ulcerative esophagitis with low risk of bleeding   H/H stable Continue to monitor BP  Protonix IV BID  D5 1/2 NS at 100 cc/ht

## 2022-12-17 NOTE — PLAN OF CARE
SS received consult: patient to return to [facility] home with hospice. Patient is from [facility] and will return at discharge. SS spoke with patient's sister, Allie, she states patient is already on hospice at the [facility]. She uses [facility], choice obtained to resume with [facility] at discharge. SS spoke with Michelle at [facility] at this time. She states there is no MD there over the weekend, so patient can not return until Monday. SS notified Dr. Montalvo of the above.

## 2022-12-17 NOTE — ASSESSMENT & PLAN NOTE
"Constantino Joseph Pt refuses to eat and take po meds  -Sister consulted. Do not want PEG and comfort care only.      Dior Young CNP

## 2022-12-17 NOTE — PROGRESS NOTES
"[facility] Medicine  Progress Note    Patient Name: Suzie Hennessy  MRN: [id number]  Patient Class: IP- Inpatient   Admission Date: 12/12/2022  Length of Stay: 3 days  Attending Physician: Javed Serrato Jr., MD  Primary Care Provider: Primary Doctor No        Subjective:     Principal Problem:Gastroesophageal reflux disease with esophagitis without hemorrhage        HPI:  Pt is a 67 y.o. f . presenting with witnessed "coffee-ground emesis".  Pt cannot provide a hx; she appears to be psychotic at the time of my exam and makes references to her grandmother and Kendrick "walking on the land and the water". She also reports that she "swam with Flipper (the dolphin)". According to nurse at bedside,  Pt had been vomiting over the weekend and it eventually became bloody.     Pt has a hx of schizophrenia, seizure disorder, dementia, HLD and GERD. According to a conversation with RN at  [facility], Pt has been institutionalized since her 20s. Of note, Pt was previously seen for a similar complaint in 9/2021. During that hospitalization, Pt underwent EGD by GI Dr. Menendez which revealed esophagitis with erythematous mucosa with erosion in the lower third of the esophagus and large hiatal hernia with no active bleeding seen.     Workup thus far is notable for the following: Initial vital sings within established limits. White blood cell count is mildly elevated at 11.38. UA was negative for infection. FOBT was (-). Acute abd x-ray was negative for obstruction. Pt is Flu and Covid negative. In the ED, patient was started on LR and protonix infusion. GI was consulted from the ED.     Pt is being admitted to the hospital medicine service under attending Javed Serrato for the evaluation and management of acute GI bleed.       Interval History: On examination, pt is resting comfortably in bed. NAD. Pt does not respond to any of my questions but does look at me and responds when asked if she would like the " blinds to be closed.     Spoke with sister today and she states she would like comfort care per patients wishes. Will start IV PPI. Sister would like patient to go back to [facility] with hospice. SS consulted.     Review of Systems   Unable to perform ROS: Other (Pt refuses to speak except when she needs something.)   Objective:     Vital Signs (Most Recent):  Temp: 98.1 °F (36.7 °C) (12/16/22 1649)  Pulse: 74 (12/16/22 1649)  Resp: 20 (12/16/22 1649)  BP: 101/66 (12/16/22 1649)  SpO2: 98 % (12/16/22 1649)   Vital Signs (24h Range):  Temp:  [98 °F (36.7 °C)-100.3 °F (37.9 °C)] 98.1 °F (36.7 °C)  Pulse:  [34-74] 74  Resp:  [18-20] 20  SpO2:  [97 %-98 %] 98 %  BP: ()/(53-68) 101/66     Weight: 49.5 kg (109 lb 2 oz)  Body mass index is 17.09 kg/m².    Intake/Output Summary (Last 24 hours) at 12/16/2022 1843  Last data filed at 12/16/2022 0735  Gross per 24 hour   Intake 68.67 ml   Output 1900 ml   Net -1831.33 ml        Physical Exam  Constitutional:       Appearance: She is ill-appearing.   HENT:      Head: Normocephalic and atraumatic.      Right Ear: External ear normal.      Left Ear: External ear normal.      Nose: Nose normal.   Eyes:      General:         Right eye: No discharge.         Left eye: No discharge.      Extraocular Movements: Extraocular movements intact.      Conjunctiva/sclera: Conjunctivae normal.      Pupils: Pupils are equal, round, and reactive to light.   Cardiovascular:      Rate and Rhythm: Normal rate and regular rhythm.   Pulmonary:      Effort: Pulmonary effort is normal.   Abdominal:      General: Abdomen is flat. Bowel sounds are normal.      Palpations: Abdomen is soft.   Skin:     General: Skin is warm and dry.      Coloration: Skin is not jaundiced.      Findings: No bruising or lesion.   Neurological:      Mental Status: She is lethargic.   Psychiatric:         Behavior: Behavior is slowed and withdrawn.       Significant Labs: All pertinent labs within the past 24 hours have  been reviewed.  Recent Lab Results  (Last 5 results in the past 24 hours)        12/16/22  1549   12/16/22  1534   12/16/22  0750   12/16/22  0706   12/16/22  0419        Baso #         0.01       Basophil %         0.1       Differential Type         Auto       Eos #         0.15       Eosinophil %         1.7       HEMATOCRIT   36.7   36.6     35.6       HEMOGLOBIN   12.3   12.2     11.8       Immature Grans (Abs)         0.03       Immature Granulocytes         0.3       Lymph #         1.19       Lymph %         13.2       MCH         31.8       MCHC         33.1       MCV         96.0       Mono #         0.57       Mono %         6.3       MPV         10.2       Neutrophils, Abs         7.06       Neutrophils Relative         78.4       nRBC         0.0       NUCLEATED RBC ABSOLUTE         0.00       Platelets         200       POC Glucose 114       104         RBC         3.71       RDW         12.6       WBC         9.01                              Significant Imaging: I have reviewed all pertinent imaging results/findings within the past 24 hours.      Assessment/Plan:      * Gastroesophageal reflux disease with esophagitis without hemorrhage  -IV protonix BID per GI recommendations    GI bleed  -GI consulted; EGD showed ulcerative esophagitis with low risk of bleeding   -H/H stable 12/36  -Continue to monitor BP  -Protonix IV BID  -NPO  -D5 1/2 NS at 100 cc/ht    Anorexia  -Pt refuses to eat and take po meds  -Sister consulted. Do not want PEG and comfort care only.     Schizophrenia, unspecified  -Pt takes Invega- Sustenna 117 mg/0.75 mL Syrg injection every 30days     Convulsions  -Continue Levitiracetam 1000mg bid      VTE Risk Mitigation (From admission, onward)         Ordered     Place sequential compression device  Until discontinued         12/13/22 0537     Reason for No Pharmacological VTE Prophylaxis  Once        Question:  Reasons:  Answer:  Active Bleeding    12/13/22 0537     IP VTE LOW RISK  PATIENT  Once         12/13/22 0537                Discharge Planning   AGUSTINA:      Code Status: DNR   Is the patient medically ready for discharge?:     Reason for patient still in hospital (select all that apply): Treatment  Discharge Plan A: Return to nursing home                  Angélica Garrett DO  Department of Hospital Medicine   [facility]

## 2022-12-17 NOTE — ASSESSMENT & PLAN NOTE
Pt takes Invega- Sustenna 117 mg/0.75 mL Syrg injection every 30days   Will attempt to verify when last dose was given   Zyprexa IM x 1 dose given today

## 2022-12-17 NOTE — ASSESSMENT & PLAN NOTE
Pt refuses to eat and take po meds  Sister consulted; PEG placement refused  Speech therapy re-consulted for swallow eval as she appears somewhat more cooperative; recs appreciated   Comfort care measures

## 2022-12-18 LAB
BACTERIA BLD CULT: NORMAL
BACTERIA BLD CULT: NORMAL

## 2022-12-18 PROCEDURE — 27000221 HC OXYGEN, UP TO 24 HOURS

## 2022-12-18 PROCEDURE — 11000001 HC ACUTE MED/SURG PRIVATE ROOM

## 2022-12-18 PROCEDURE — 99232 PR SUBSEQUENT HOSPITAL CARE,LEVL II: ICD-10-PCS | Mod: GC,,, | Performed by: FAMILY MEDICINE

## 2022-12-18 PROCEDURE — C9113 INJ PANTOPRAZOLE SODIUM, VIA: HCPCS | Performed by: FAMILY MEDICINE

## 2022-12-18 PROCEDURE — 25000003 PHARM REV CODE 250: Performed by: FAMILY MEDICINE

## 2022-12-18 PROCEDURE — 63600175 PHARM REV CODE 636 W HCPCS: Performed by: HOSPITALIST

## 2022-12-18 PROCEDURE — 99232 SBSQ HOSP IP/OBS MODERATE 35: CPT | Mod: GC,,, | Performed by: FAMILY MEDICINE

## 2022-12-18 PROCEDURE — S5010 5% DEXTROSE AND 0.45% SALINE: HCPCS | Performed by: FAMILY MEDICINE

## 2022-12-18 PROCEDURE — 94761 N-INVAS EAR/PLS OXIMETRY MLT: CPT

## 2022-12-18 PROCEDURE — 63600175 PHARM REV CODE 636 W HCPCS: Performed by: FAMILY MEDICINE

## 2022-12-18 RX ADMIN — DEXTROSE AND SODIUM CHLORIDE: 5; 450 INJECTION, SOLUTION INTRAVENOUS at 10:12

## 2022-12-18 RX ADMIN — LEVETIRACETAM 500 MG: 100 INJECTION, SOLUTION INTRAVENOUS at 09:12

## 2022-12-18 RX ADMIN — PANTOPRAZOLE SODIUM 40 MG: 40 INJECTION, POWDER, LYOPHILIZED, FOR SOLUTION INTRAVENOUS at 09:12

## 2022-12-18 RX ADMIN — DEXTROSE AND SODIUM CHLORIDE: 5; 450 INJECTION, SOLUTION INTRAVENOUS at 09:12

## 2022-12-18 NOTE — PT/OT/SLP PROGRESS
Speech Language Pathology      Suzie Hennessy  MRN: [id number]    Patient not seen today secondary to Patient unwilling to participate (Patient would not open oral cavity to retrieve material from spoon.). Will follow-up 12/19/2022.

## 2022-12-18 NOTE — ASSESSMENT & PLAN NOTE
Pt takes Invega- Sustenna 117 mg/0.75 mL Syrg injection every 30days   Will attempt to verify when last dose was given   Zyprexa IM x 1 dose given yesterday

## 2022-12-18 NOTE — PROGRESS NOTES
"[facility] Hospital Medicine  Progress Note    Patient Name: Suzie Hennessy  MRN: [id number]  Patient Class: IP- Inpatient   Admission Date: 12/12/2022  Length of Stay: 5 days  Attending Physician: Javed Serrato Jr., MD  Primary Care Provider: Primary Doctor No        Subjective:     Principal Problem:Gastroesophageal reflux disease with esophagitis without hemorrhage        HPI:  Pt is a 67 y.o. f . presenting with witnessed "coffee-ground emesis".  Pt cannot provide a hx; she appears to be psychotic at the time of my exam and makes references to her grandmother and Kendrick "walking on the land and the water". She also reports that she "swam with Flipper (the dolphin)". According to nurse at bedside,  Pt had been vomiting over the weekend and it eventually became bloody.     Pt has a hx of schizophrenia, seizure disorder, dementia, HLD and GERD. According to a conversation with RN at  [facility], Pt has been institutionalized since her 20s. Of note, Pt was previously seen for a similar complaint in 9/2021. During that hospitalization, Pt underwent EGD by GI Dr. Menendez which revealed esophagitis with erythematous mucosa with erosion in the lower third of the esophagus and large hiatal hernia with no active bleeding seen.     Workup thus far is notable for the following: Initial vital sings within established limits. White blood cell count is mildly elevated at 11.38. UA was negative for infection. FOBT was (-). Acute abd x-ray was negative for obstruction. Pt is Flu and Covid negative. In the ED, patient was started on LR and protonix infusion. GI was consulted from the ED.     Pt is being admitted to the hospital medicine service under attending Javed Serrato for the evaluation and management of acute GI bleed.       Overview/Hospital Course:  No notes on file    Interval History: Pt examined at bedside this am. She is resting in bed, in no acute distress. Pt is nonverbal today. She does not " respond to my questions. ST therapy attempted to evaluate the Pt again yesterday but were unsuccessful . Pt continues to not eat or take po meds.     Discharge to NH with hospice is anticipated for tomorrow.     Review of Systems   Unable to perform ROS: Psychiatric disorder   Objective:     Vital Signs (Most Recent):  Temp: 97.7 °F (36.5 °C) (12/18/22 1200)  Pulse: 69 (12/18/22 1200)  Resp: 17 (12/18/22 1200)  BP: (!) 141/63 (12/18/22 1200)  SpO2: 97 % (12/18/22 1200)   Vital Signs (24h Range):  Temp:  [97.7 °F (36.5 °C)-99.1 °F (37.3 °C)] 97.7 °F (36.5 °C)  Pulse:  [52-69] 69  Resp:  [16-18] 17  SpO2:  [97 %-98 %] 97 %  BP: ()/(44-67) 141/63     Weight: 49.5 kg (109 lb 2 oz)  Body mass index is 17.09 kg/m².    Intake/Output Summary (Last 24 hours) at 12/18/2022 1511  Last data filed at 12/18/2022 0605  Gross per 24 hour   Intake --   Output 2450 ml   Net -2450 ml      Physical Exam  Constitutional:       Appearance: She is ill-appearing.   HENT:      Head: Normocephalic and atraumatic.      Right Ear: External ear normal.      Left Ear: External ear normal.      Nose: Nose normal.   Eyes:      General:         Right eye: No discharge.         Left eye: No discharge.      Extraocular Movements: Extraocular movements intact.      Conjunctiva/sclera: Conjunctivae normal.      Pupils: Pupils are equal, round, and reactive to light.   Cardiovascular:      Rate and Rhythm: Normal rate and regular rhythm.   Pulmonary:      Effort: Pulmonary effort is normal.   Abdominal:      General: Abdomen is flat. Bowel sounds are normal.      Palpations: Abdomen is soft.   Skin:     General: Skin is warm and dry.      Coloration: Skin is not jaundiced.      Findings: No bruising or lesion.   Neurological:      Mental Status: She is lethargic.   Psychiatric:         Attention and Perception: She perceives visual hallucinations.         Speech: Speech is delayed and tangential.         Behavior: Behavior is slowed and withdrawn.        Significant Labs: All pertinent labs within the past 24 hours have been reviewed.    Significant Imaging: I have reviewed all pertinent imaging results/findings within the past 24 hours.      Assessment/Plan:      * Gastroesophageal reflux disease with esophagitis without hemorrhage  IV protonix BID per GI recommendations    Anorexia  Pt refuses to eat and take po meds  Sister consulted; PEG placement refused  Speech therapy re-consulted but Pt remains uncooperative; recs appreciated   Comfort care measures       Schizophrenia, unspecified  Pt takes Invega- Sustenna 117 mg/0.75 mL Syrg injection every 30days   Will attempt to verify when last dose was given   Zyprexa IM x 1 dose given yesterday       GI bleed  GI consulted; EGD showed ulcerative esophagitis with low risk of bleeding   H/H stable Continue to monitor BP  Protonix IV BID  D5 1/2 NS at 100 cc/ht    Convulsions  Continue Levitiracetam 1000mg bid      VTE Risk Mitigation (From admission, onward)         Ordered     Place sequential compression device  Until discontinued         12/13/22 0537     Reason for No Pharmacological VTE Prophylaxis  Once        Question:  Reasons:  Answer:  Active Bleeding    12/13/22 0537     IP VTE LOW RISK PATIENT  Once         12/13/22 0537                Discharge Planning   AGUSTINA:      Code Status: DNR   Is the patient medically ready for discharge?:     Reason for patient still in hospital (select all that apply): Pending disposition  Discharge Plan A: Return to nursing home                  Gurdeep Montalvo DO  Department of Hospital Medicine   [facility]

## 2022-12-18 NOTE — ASSESSMENT & PLAN NOTE
Pt refuses to eat and take po meds  Sister consulted; PEG placement refused  Speech therapy re-consulted but Pt remains uncooperative; recs appreciated   Comfort care measures

## 2022-12-18 NOTE — SUBJECTIVE & OBJECTIVE
Interval History: Pt examined at bedside this am. She is resting in bed, in no acute distress. Pt is nonverbal today. She does not respond to my questions. ST therapy attempted to evaluate the Pt again yesterday but were unsuccessful . Pt continues to not eat or take po meds.     Discharge to NH with hospice is anticipated for tomorrow.     Review of Systems   Unable to perform ROS: Psychiatric disorder   Objective:     Vital Signs (Most Recent):  Temp: 97.7 °F (36.5 °C) (12/18/22 1200)  Pulse: 69 (12/18/22 1200)  Resp: 17 (12/18/22 1200)  BP: (!) 141/63 (12/18/22 1200)  SpO2: 97 % (12/18/22 1200)   Vital Signs (24h Range):  Temp:  [97.7 °F (36.5 °C)-99.1 °F (37.3 °C)] 97.7 °F (36.5 °C)  Pulse:  [52-69] 69  Resp:  [16-18] 17  SpO2:  [97 %-98 %] 97 %  BP: ()/(44-67) 141/63     Weight: 49.5 kg (109 lb 2 oz)  Body mass index is 17.09 kg/m².    Intake/Output Summary (Last 24 hours) at 12/18/2022 1511  Last data filed at 12/18/2022 0605  Gross per 24 hour   Intake --   Output 2450 ml   Net -2450 ml      Physical Exam  Constitutional:       Appearance: She is ill-appearing.   HENT:      Head: Normocephalic and atraumatic.      Right Ear: External ear normal.      Left Ear: External ear normal.      Nose: Nose normal.   Eyes:      General:         Right eye: No discharge.         Left eye: No discharge.      Extraocular Movements: Extraocular movements intact.      Conjunctiva/sclera: Conjunctivae normal.      Pupils: Pupils are equal, round, and reactive to light.   Cardiovascular:      Rate and Rhythm: Normal rate and regular rhythm.   Pulmonary:      Effort: Pulmonary effort is normal.   Abdominal:      General: Abdomen is flat. Bowel sounds are normal.      Palpations: Abdomen is soft.   Skin:     General: Skin is warm and dry.      Coloration: Skin is not jaundiced.      Findings: No bruising or lesion.   Neurological:      Mental Status: She is lethargic.   Psychiatric:         Attention and Perception: She  perceives visual hallucinations.         Speech: Speech is delayed and tangential.         Behavior: Behavior is slowed and withdrawn.       Significant Labs: All pertinent labs within the past 24 hours have been reviewed.    Significant Imaging: I have reviewed all pertinent imaging results/findings within the past 24 hours.

## 2022-12-19 VITALS
HEART RATE: 66 BPM | OXYGEN SATURATION: 95 % | RESPIRATION RATE: 19 BRPM | WEIGHT: 109.13 LBS | DIASTOLIC BLOOD PRESSURE: 56 MMHG | SYSTOLIC BLOOD PRESSURE: 105 MMHG | TEMPERATURE: 98 F | BODY MASS INDEX: 17.13 KG/M2 | HEIGHT: 67 IN

## 2022-12-19 PROBLEM — K21.00 GASTROESOPHAGEAL REFLUX DISEASE WITH ESOPHAGITIS WITHOUT HEMORRHAGE: Chronic | Status: RESOLVED | Noted: 2021-09-26 | Resolved: 2022-12-19

## 2022-12-19 PROBLEM — K92.2 GI BLEED: Status: RESOLVED | Noted: 2021-09-25 | Resolved: 2022-12-19

## 2022-12-19 PROBLEM — Z51.5 COMFORT MEASURES ONLY STATUS: Status: ACTIVE | Noted: 2022-12-19

## 2022-12-19 LAB — GLUCOSE SERPL-MCNC: 121 MG/DL (ref 70–105)

## 2022-12-19 PROCEDURE — 25000003 PHARM REV CODE 250: Performed by: FAMILY MEDICINE

## 2022-12-19 PROCEDURE — 63600175 PHARM REV CODE 636 W HCPCS: Performed by: HOSPITALIST

## 2022-12-19 PROCEDURE — 27000221 HC OXYGEN, UP TO 24 HOURS

## 2022-12-19 PROCEDURE — 94761 N-INVAS EAR/PLS OXIMETRY MLT: CPT

## 2022-12-19 PROCEDURE — 63600175 PHARM REV CODE 636 W HCPCS: Performed by: FAMILY MEDICINE

## 2022-12-19 PROCEDURE — S5010 5% DEXTROSE AND 0.45% SALINE: HCPCS | Performed by: FAMILY MEDICINE

## 2022-12-19 PROCEDURE — 99239 HOSP IP/OBS DSCHRG MGMT >30: CPT | Mod: GC,,, | Performed by: FAMILY MEDICINE

## 2022-12-19 PROCEDURE — 99239 PR HOSPITAL DISCHARGE DAY,>30 MIN: ICD-10-PCS | Mod: GC,,, | Performed by: FAMILY MEDICINE

## 2022-12-19 PROCEDURE — C9113 INJ PANTOPRAZOLE SODIUM, VIA: HCPCS | Performed by: FAMILY MEDICINE

## 2022-12-19 RX ORDER — OMEPRAZOLE 40 MG/1
40 CAPSULE, DELAYED RELEASE ORAL
Qty: 90 CAPSULE | Refills: 5 | Status: ON HOLD
Start: 2022-12-19 | End: 2023-05-22 | Stop reason: HOSPADM

## 2022-12-19 RX ADMIN — LEVETIRACETAM 500 MG: 100 INJECTION, SOLUTION INTRAVENOUS at 09:12

## 2022-12-19 RX ADMIN — DEXTROSE AND SODIUM CHLORIDE: 5; 450 INJECTION, SOLUTION INTRAVENOUS at 09:12

## 2022-12-19 RX ADMIN — PANTOPRAZOLE SODIUM 40 MG: 40 INJECTION, POWDER, LYOPHILIZED, FOR SOLUTION INTRAVENOUS at 09:12

## 2022-12-19 NOTE — PLAN OF CARE
Problem: Infection  Goal: Absence of Infection Signs and Symptoms  Outcome: Ongoing, Progressing     Problem: Adult Inpatient Plan of Care  Goal: Plan of Care Review  Outcome: Ongoing, Progressing  Goal: Patient-Specific Goal (Individualized)  Outcome: Ongoing, Progressing  Goal: Absence of Hospital-Acquired Illness or Injury  Outcome: Ongoing, Progressing  Goal: Optimal Comfort and Wellbeing  Outcome: Ongoing, Progressing  Intervention: Provide Person-Centered Care  Flowsheets (Taken 12/18/2022 8722)  Trust Relationship/Rapport:   care explained   reassurance provided   choices provided   thoughts/feelings acknowledged   emotional support provided   empathic listening provided   questions answered   questions encouraged  Goal: Readiness for Transition of Care  Outcome: Ongoing, Progressing     Problem: Skin Injury Risk Increased  Goal: Skin Health and Integrity  Outcome: Ongoing, Progressing

## 2022-12-19 NOTE — HOSPITAL COURSE
Patient presented to ED with vomiting x2 days that eventually became bloody according to [facility]. EDG was preformed and grade D ulcerative esophagitis was found, likely due to PPI non-compliance. The ulcerative esophagitis is low risk for future bleeding. Patient's power of  denied PEG tube and wishes for comfort care. Patient's H/H are stable, she has reached maximum medical benefit, and will be d/c to [facility] on hospice with instructions to continue PPI as directed.

## 2022-12-19 NOTE — PLAN OF CARE
Problem: Adult Inpatient Plan of Care  Goal: Plan of Care Review  Outcome: Ongoing, Progressing  Flowsheets (Taken 12/19/2022 0517)  Plan of Care Reviewed With: patient     Problem: Skin Injury Risk Increased  Goal: Skin Health and Integrity  Outcome: Ongoing, Progressing  Intervention: Optimize Skin Protection  Flowsheets (Taken 12/19/2022 0517)  Pressure Reduction Techniques:   frequent weight shift encouraged   weight shift assistance provided  Pressure Reduction Devices: positioning supports utilized  Skin Protection:   adhesive use limited   incontinence pads utilized   tubing/devices free from skin contact  Head of Bed (HOB) Positioning: HOB elevated  Intervention: Promote and Optimize Oral Intake  Flowsheets (Taken 12/19/2022 0517)  Oral Nutrition Promotion: rest periods promoted     Problem: Gas Exchange Impaired  Goal: Optimal Gas Exchange  Outcome: Ongoing, Progressing  Intervention: Optimize Oxygenation and Ventilation  Flowsheets (Taken 12/19/2022 0517)  Airway/Ventilation Management:   calming measures promoted   pulmonary hygiene promoted  Head of Bed (HOB) Positioning: HOB elevated

## 2022-12-19 NOTE — PLAN OF CARE
Per Nurse Talamantes (marie haddad) pt is on hospice at facility. She is current with [facility] ([phone]).

## 2022-12-19 NOTE — PLAN OF CARE
[facility]  Discharge Final Note    Primary Care Provider: Primary Doctor No    Expected Discharge Date: 12/19/2022    Final Discharge Note (most recent)       Final Note - 12/19/22 1322          Final Note    Assessment Type Final Discharge Note     Anticipated Discharge Disposition Skilled Nursing Facility        Post-Acute Status    Post-Acute Authorization Placement     Post-Acute Placement Status Set-up Complete/Auth obtained     Patient choice form signed by patient/caregiver List with quality metrics by geographic area provided;List from CMS Compare;List from System Post-Acute Care     Discharge Delays None known at this time                     Important Message from Medicare  Important Message from Medicare regarding Discharge Appeal Rights: Given to patient/caregiver     Date IMM was signed: 12/19/22  Time IMM was signed: 1325    Contact Info       No, Primary Doctor   Relationship: PCP - General        Next Steps: Follow up          Pt dc to [facility] packet taken to floor now. Given to Juliane. 0 further dc needs.

## 2022-12-19 NOTE — DISCHARGE SUMMARY
"[facility]  [facility] Hospital Medicine  Discharge Summary      Patient Name: Suzie Hennessy  MRN: [id number]  VIDYA: [id number]  Patient Class: IP- Inpatient  Admission Date: 12/12/2022  Hospital Length of Stay: 6 days  Discharge Date and Time:  12/19/2022 12:22 PM  Attending Physician: Javed Serrato Jr., MD   Discharging Provider: Rob Bain DO  Primary Care Provider: Primary Doctor Valarie    Primary Care Team: Networked reference to record PCT     HPI:   Pt is a 67 y.o. f . presenting with witnessed "coffee-ground emesis".  Pt cannot provide a hx; she appears to be psychotic at the time of my exam and makes references to her grandmother and Kendrick "walking on the land and the water". She also reports that she "swam with Flipper (the dolphin)". According to nurse at bedside,  Pt had been vomiting over the weekend and it eventually became bloody.     Pt has a hx of schizophrenia, seizure disorder, dementia, HLD and GERD. According to a conversation with RN at [facility], Pt has been institutionalized since her 20s. Of note, Pt was previously seen for a similar complaint in 9/2021. During that hospitalization, Pt underwent EGD by GI Dr. Menendez which revealed esophagitis with erythematous mucosa with erosion in the lower third of the esophagus and large hiatal hernia with no active bleeding seen.     Workup thus far is notable for the following: Initial vital sings within established limits. White blood cell count is mildly elevated at 11.38. UA was negative for infection. FOBT was (-). Acute abd x-ray was negative for obstruction. Pt is Flu and Covid negative. In the ED, patient was started on LR and protonix infusion. GI was consulted from the ED.     Pt is being admitted to the hospital medicine service under attending Javed Serrato for the evaluation and management of acute GI bleed.       * No surgery found *      Hospital Course:   Patient presented to ED with vomiting x2 days that eventually became " bloody according to [facility]. EDG was preformed and grade D ulcerative esophagitis was found, likely due to PPI non-compliance. The ulcerative esophagitis is low risk for future bleeding. Patient's power of  denied PEG tube and wishes for comfort care. Patient's H/H are stable, she has reached maximum medical benefit, and will be d/c to [facility] on hospice with instructions to continue PPI as directed.        Goals of Care Treatment Preferences:  Code Status: DNR      Consults:   Consults (From admission, onward)        Status Ordering Provider     Inpatient consult to Social Work  Once        Provider:  (Not yet assigned)    Completed FISH MOTA JR     Inpatient consult to Gastroenterology  Once        Provider:  JAJA Mccallum MD    Completed SEBASTIAN, MIN S          No new Assessment & Plan notes have been filed under this hospital service since the last note was generated.  Service: Hospital Medicine    Final Active Diagnoses:    Diagnosis Date Noted POA    Comfort measures only status [Z51.5] 12/19/2022 Not Applicable    Anorexia [R63.0] 12/15/2022 Yes     Chronic    Schizophrenia, unspecified [F20.9] 12/13/2022 Yes     Chronic    Convulsions [R56.9] 08/25/2021 Yes     Chronic      Problems Resolved During this Admission:    Diagnosis Date Noted Date Resolved POA    PRINCIPAL PROBLEM:  Gastroesophageal reflux disease with esophagitis without hemorrhage [K21.00] 09/26/2021 12/19/2022 Yes     Chronic    Altered mental state [R41.82] 12/13/2022 12/16/2022 Yes    Constipation [K59.00] 12/13/2022 12/13/2022 Unknown    GI bleed [K92.2] 09/25/2021 12/19/2022 Yes    GERD (gastroesophageal reflux disease) [K21.9] 09/25/2021 12/16/2022 Yes       Discharged Condition: stable    Disposition: Home or Self Care    Follow Up:   Follow-up Information     Primary Doctor No .                     Patient Instructions:      Diet Adult Regular       Significant Diagnostic Studies: Labs: BMP: No  results for input(s): GLU, NA, K, CL, CO2, BUN, CREATININE, CALCIUM, MG in the last 48 hours.    Pending Diagnostic Studies:     Procedure Component Value Units Date/Time    EXTRA TUBES [[id number]]     Order Status: Sent Lab Status: No result     Specimen: Blood, Venous     Narrative:      The following orders were created for panel order EXTRA TUBES.  Procedure                               Abnormality         Status                     ---------                               -----------         ------                     Red Top Hold[[id number]]                                                                  Please view results for these tests on the individual orders.    EXTRA TUBES [[id number]] Collected: 12/15/22 2352    Order Status: Sent Lab Status: In process Updated: 12/15/22 2352    Specimen: Blood, Venous     Narrative:      The following orders were created for panel order EXTRA TUBES.  Procedure                               Abnormality         Status                     ---------                               -----------         ------                     Red Top Hold[[id number]]                                     In process                   Please view results for these tests on the individual orders.    EXTRA TUBES [[id number]] Collected: 12/15/22 0651    Order Status: Sent Lab Status: In process Updated: 12/15/22 0704    Specimen: Blood, Venous     Narrative:      The following orders were created for panel order EXTRA TUBES.  Procedure                               Abnormality         Status                     ---------                               -----------         ------                     Gold Top Hold[[id number]]                                    In process                   Please view results for these tests on the individual orders.    EXTRA TUBES [[id number]] Collected: 12/14/22 0014    Order Status: Sent Lab Status: In process Updated: 12/14/22 0014    Specimen: Blood, Venous      Narrative:      The following orders were created for panel order EXTRA TUBES.  Procedure                               Abnormality         Status                     ---------                               -----------         ------                     Red Top Hold[[id number]]                                     In process                   Please view results for these tests on the individual orders.    EXTRA TUBES [[id number]] Collected: 12/12/22 2248    Order Status: Sent Lab Status: In process Updated: 12/13/22 1525    Specimen: Blood, Venous     Narrative:      The following orders were created for panel order EXTRA TUBES.  Procedure                               Abnormality         Status                     ---------                               -----------         ------                     Light Blue Top Hold[[id number]]                              In process                 Light Green Top Hold[[id number]]                             In process                 Torres Top Hold[[id number]]                                                                 Please view results for these tests on the individual orders.    EXTRA TUBES [[id number]] Collected: 12/13/22 0540    Order Status: Sent Lab Status: In process Updated: 12/13/22 0550    Specimen: Blood, Venous     Narrative:      The following orders were created for panel order EXTRA TUBES.  Procedure                               Abnormality         Status                     ---------                               -----------         ------                     Lavender Top Hold[[id number]]                                In process                   Please view results for these tests on the individual orders.    EXTRA TUBES [[id number]] Collected: 12/13/22 0540    Order Status: Sent Lab Status: In process Updated: 12/13/22 0550    Specimen: Blood, Venous     Narrative:      The following orders were created for panel order EXTRA TUBES.  Procedure                                Abnormality         Status                     ---------                               -----------         ------                     Gold Top Hold[[id number]]                                    In process                   Please view results for these tests on the individual orders.    Red Top Hold [[id number]]     Order Status: Sent Lab Status: No result     Specimen: Blood, Venous          Medications:  Reconciled Home Medications:      Medication List      CHANGE how you take these medications    omeprazole 40 MG capsule  Commonly known as: PRILOSEC  Take 1 capsule (40 mg total) by mouth 2 (two) times daily before meals.  What changed: when to take this        CONTINUE taking these medications    acetaminophen 500 MG tablet  Commonly known as: TYLENOL  Take 500 mg by mouth every 6 (six) hours as needed for Pain.     docusate sodium 100 MG capsule  Commonly known as: COLACE  Take 100 mg by mouth 2 (two) times daily.     folic acid 1 MG tablet  Commonly known as: FOLVITE  Take 1 mg by mouth once daily.     INVEGA SUSTENNA 117 mg/0.75 mL Syrg injection  Generic drug: paliperidone palmitate  Inject 117 mg into the muscle every 30 days.     lactulose 10 gram/15 ml 10 gram/15 mL (15 mL) solution  Commonly known as: CHRONULAC  Take 60 mLs by mouth 4 (four) times daily. 60 cc po qid     levetiracetam 500 mg/5 mL (5 mL) Soln  Take 10 mLs (1,000 mg total) by mouth 2 (two) times daily.     multivitamin with minerals tablet  Take 1 tablet by mouth once daily.        STOP taking these medications    pantoprazole 40 MG tablet  Commonly known as: PROTONIX            Indwelling Lines/Drains at time of discharge:   Lines/Drains/Airways     Drain  Duration           Female External Urinary Catheter 12/14/22 0704 5 days                Time spent on the discharge of patient: 45 minutes         Rob Bain DO  Department of Hospital Medicine  [facility]

## 2022-12-19 NOTE — PT/OT/SLP PROGRESS
Speech Language Pathology      Suzie Hennessy  MRN: [id number]    Patient not seen today secondary to Patient unwilling to participate, Other (Comment) (Pt has refused all attempts for a swallow eval. D/C order at this time.).

## 2023-03-06 NOTE — ED NOTES
"Chief Complaint  Eye Drainage    Subjective          History of Present Illness  Víctor Montana is here today with his GM who helped provide detailed history of chief complaint.  He is here today for concerns of waking up this morning with red eyes and thick discharge.  No cough, congestion, fevers, vomiting, diarrhea, ear pain.  Mom states he was playing outside yesterday.    Objective   Vital Signs:   BP 90/60   Pulse 115   Temp 99.4 °F (37.4 °C)   Ht 95.3 cm (37.5\")   Wt 13.6 kg (30 lb)   HC 49.5 cm (19.5\")   SpO2 99%   BMI 15.00 kg/m²     Body mass index is 15 kg/m².      Review of Systems   Constitutional: Negative for activity change, appetite change and fever.   HENT: Negative for congestion, ear pain, rhinorrhea and sore throat.    Eyes: Positive for discharge and redness.   Respiratory: Negative for cough.    Gastrointestinal: Negative for diarrhea and vomiting.   Skin: Negative for rash.       No current outpatient medications on file.    Allergies: Patient has no known allergies.    Physical Exam  Constitutional:       General: He is active.      Appearance: Normal appearance.   HENT:      Right Ear: Tympanic membrane, ear canal and external ear normal.      Left Ear: Tympanic membrane, ear canal and external ear normal.      Mouth/Throat:      Mouth: Mucous membranes are moist.      Pharynx: Oropharynx is clear.   Eyes:      General:         Right eye: Discharge and erythema present.         Left eye: Discharge present.     Conjunctiva/sclera: Conjunctivae normal.   Cardiovascular:      Rate and Rhythm: Normal rate and regular rhythm.   Pulmonary:      Effort: Pulmonary effort is normal.      Breath sounds: Normal breath sounds.   Abdominal:      Palpations: Abdomen is soft.   Skin:     General: Skin is warm.      Capillary Refill: Capillary refill takes less than 2 seconds.   Neurological:      Mental Status: He is alert.          Result Review :                   Assessment and Plan    Diagnoses and " ATTEMPTED TO CALL REPORT AT THIS TIME, RECEIVED VOICEMAIL THIS TIME.    all orders for this visit:    1. Bacterial conjunctivitis (Primary)  Assessment & Plan:  Discussed this does look like a bacterial conjunctivitis.  We discussed warm compresses.  We will also start on Vigamox, 1 drop 3 times daily for 7 days.  Will need to stay home until she has been on the antibiotic eyedrops for 24 hours. Will also start on loratadine. Discussed with GM if not improving, may try allergy eye gtts.        Follow Up   Return if symptoms worsen or fail to improve.  Patient was given instructions and counseling regarding his condition or for health maintenance advice. Please see specific information pulled into the AVS if appropriate.     Yoshi Pickard MD  03/06/2023

## 2023-03-20 PROBLEM — K92.2 GASTROINTESTINAL HEMORRHAGE: Status: RESOLVED | Noted: 2021-09-25 | Resolved: 2023-03-20

## 2023-05-20 ENCOUNTER — HOSPITAL ENCOUNTER (OUTPATIENT)
Facility: HOSPITAL | Age: 68
Discharge: NURSING, REHAB OR LONG-TERM CARE | End: 2023-05-22
Attending: EMERGENCY MEDICINE | Admitting: HOSPITALIST
Payer: MEDICARE

## 2023-05-20 DIAGNOSIS — K92.2 UGIB (UPPER GASTROINTESTINAL BLEED): ICD-10-CM

## 2023-05-20 DIAGNOSIS — R06.00 DYSPNEA: ICD-10-CM

## 2023-05-20 DIAGNOSIS — R11.10 VOMITING, UNSPECIFIED VOMITING TYPE, UNSPECIFIED WHETHER NAUSEA PRESENT: Primary | ICD-10-CM

## 2023-05-20 DIAGNOSIS — R10.9 ABDOMINAL PAIN: ICD-10-CM

## 2023-05-20 DIAGNOSIS — J18.9 COMMUNITY ACQUIRED PNEUMONIA OF RIGHT LOWER LOBE OF LUNG: ICD-10-CM

## 2023-05-20 PROBLEM — Z51.5 COMFORT MEASURES ONLY STATUS: Status: RESOLVED | Noted: 2022-12-19 | Resolved: 2023-05-20

## 2023-05-20 PROBLEM — G40.909 SEIZURE DISORDER: Status: ACTIVE | Noted: 2021-08-25

## 2023-05-20 LAB
ALBUMIN SERPL BCP-MCNC: 3.4 G/DL (ref 3.5–5)
ALBUMIN/GLOB SERPL: 0.9 {RATIO}
ALP SERPL-CCNC: 100 U/L (ref 55–142)
ALT SERPL W P-5'-P-CCNC: 31 U/L (ref 13–56)
AMPHET UR QL SCN: NEGATIVE
ANION GAP SERPL CALCULATED.3IONS-SCNC: 10 MMOL/L (ref 7–16)
ANION GAP SERPL CALCULATED.3IONS-SCNC: 9 MMOL/L (ref 7–16)
APTT PPP: 24.9 SECONDS (ref 25.2–37.3)
AST SERPL W P-5'-P-CCNC: 19 U/L (ref 15–37)
BACTERIA #/AREA URNS HPF: ABNORMAL /HPF
BARBITURATES UR QL SCN: NEGATIVE
BASOPHILS # BLD AUTO: 0.02 K/UL (ref 0–0.2)
BASOPHILS # BLD AUTO: 0.02 K/UL (ref 0–0.2)
BASOPHILS NFR BLD AUTO: 0.2 % (ref 0–1)
BASOPHILS NFR BLD AUTO: 0.2 % (ref 0–1)
BENZODIAZ METAB UR QL SCN: NEGATIVE
BILIRUB SERPL-MCNC: 0.7 MG/DL (ref ?–1.2)
BILIRUB UR QL STRIP: NEGATIVE
BUN SERPL-MCNC: 10 MG/DL (ref 7–18)
BUN SERPL-MCNC: 13 MG/DL (ref 7–18)
BUN/CREAT SERPL: 21 (ref 6–20)
BUN/CREAT SERPL: 21 (ref 6–20)
CALCIUM SERPL-MCNC: 8.2 MG/DL (ref 8.5–10.1)
CALCIUM SERPL-MCNC: 8.8 MG/DL (ref 8.5–10.1)
CANNABINOIDS UR QL SCN: NEGATIVE
CHLORIDE SERPL-SCNC: 104 MMOL/L (ref 98–107)
CHLORIDE SERPL-SCNC: 106 MMOL/L (ref 98–107)
CLARITY UR: ABNORMAL
CO2 SERPL-SCNC: 27 MMOL/L (ref 21–32)
CO2 SERPL-SCNC: 30 MMOL/L (ref 21–32)
COCAINE UR QL SCN: NEGATIVE
COLOR UR: YELLOW
CREAT SERPL-MCNC: 0.48 MG/DL (ref 0.55–1.02)
CREAT SERPL-MCNC: 0.61 MG/DL (ref 0.55–1.02)
DIFFERENTIAL METHOD BLD: ABNORMAL
DIFFERENTIAL METHOD BLD: ABNORMAL
EGFR (NO RACE VARIABLE) (RUSH/TITUS): 103 ML/MIN/1.73M2
EGFR (NO RACE VARIABLE) (RUSH/TITUS): 97 ML/MIN/1.73M2
EOSINOPHIL # BLD AUTO: 0.06 K/UL (ref 0–0.5)
EOSINOPHIL # BLD AUTO: 0.08 K/UL (ref 0–0.5)
EOSINOPHIL NFR BLD AUTO: 0.6 % (ref 1–4)
EOSINOPHIL NFR BLD AUTO: 0.7 % (ref 1–4)
ERYTHROCYTE [DISTWIDTH] IN BLOOD BY AUTOMATED COUNT: 13.2 % (ref 11.5–14.5)
ERYTHROCYTE [DISTWIDTH] IN BLOOD BY AUTOMATED COUNT: 13.3 % (ref 11.5–14.5)
GLOBULIN SER-MCNC: 3.7 G/DL (ref 2–4)
GLUCOSE SERPL-MCNC: 112 MG/DL (ref 74–106)
GLUCOSE SERPL-MCNC: 128 MG/DL (ref 74–106)
GLUCOSE UR STRIP-MCNC: NORMAL MG/DL
HCT VFR BLD AUTO: 40.4 % (ref 38–47)
HCT VFR BLD AUTO: 41.2 % (ref 38–47)
HCT VFR BLD AUTO: 42 % (ref 38–47)
HGB BLD-MCNC: 13 G/DL (ref 12–16)
HGB BLD-MCNC: 13.2 G/DL (ref 12–16)
HGB BLD-MCNC: 13.4 G/DL (ref 12–16)
IMM GRANULOCYTES # BLD AUTO: 0.04 K/UL (ref 0–0.04)
IMM GRANULOCYTES # BLD AUTO: 0.07 K/UL (ref 0–0.04)
IMM GRANULOCYTES NFR BLD: 0.4 % (ref 0–0.4)
IMM GRANULOCYTES NFR BLD: 0.7 % (ref 0–0.4)
INR BLD: 0.97
KETONES UR STRIP-SCNC: ABNORMAL MG/DL
LEUKOCYTE ESTERASE UR QL STRIP: ABNORMAL
LYMPHOCYTES # BLD AUTO: 0.92 K/UL (ref 1–4.8)
LYMPHOCYTES # BLD AUTO: 1 K/UL (ref 1–4.8)
LYMPHOCYTES NFR BLD AUTO: 9.2 % (ref 27–41)
LYMPHOCYTES NFR BLD AUTO: 9.4 % (ref 27–41)
MCH RBC QN AUTO: 31 PG (ref 27–31)
MCH RBC QN AUTO: 31.3 PG (ref 27–31)
MCHC RBC AUTO-ENTMCNC: 31.9 G/DL (ref 32–36)
MCHC RBC AUTO-ENTMCNC: 32.2 G/DL (ref 32–36)
MCV RBC AUTO: 97.2 FL (ref 80–96)
MCV RBC AUTO: 97.3 FL (ref 80–96)
MONOCYTES # BLD AUTO: 0.44 K/UL (ref 0–0.8)
MONOCYTES # BLD AUTO: 0.45 K/UL (ref 0–0.8)
MONOCYTES NFR BLD AUTO: 4.1 % (ref 2–6)
MONOCYTES NFR BLD AUTO: 4.6 % (ref 2–6)
MPC BLD CALC-MCNC: 10.8 FL (ref 9.4–12.4)
MPC BLD CALC-MCNC: 10.9 FL (ref 9.4–12.4)
MUCOUS THREADS #/AREA URNS HPF: ABNORMAL /HPF
NEUTROPHILS # BLD AUTO: 8.24 K/UL (ref 1.8–7.7)
NEUTROPHILS # BLD AUTO: 9.28 K/UL (ref 1.8–7.7)
NEUTROPHILS NFR BLD AUTO: 84.5 % (ref 53–65)
NEUTROPHILS NFR BLD AUTO: 85.4 % (ref 53–65)
NITRITE UR QL STRIP: NEGATIVE
NRBC # BLD AUTO: 0 X10E3/UL
NRBC # BLD AUTO: 0 X10E3/UL
NRBC, AUTO (.00): 0 %
NRBC, AUTO (.00): 0 %
NT-PROBNP SERPL-MCNC: 94 PG/ML (ref 1–125)
OCCULT BLOOD, OTHER: POSITIVE
OPIATES UR QL SCN: NEGATIVE
PCP UR QL SCN: NEGATIVE
PH UR STRIP: 8 PH UNITS
PH, OTHER: 1
PLATELET # BLD AUTO: 172 K/UL (ref 150–400)
PLATELET # BLD AUTO: 180 K/UL (ref 150–400)
POC OCCULT BLOOD STOOL: NEGATIVE
POTASSIUM SERPL-SCNC: 3.7 MMOL/L (ref 3.5–5.1)
POTASSIUM SERPL-SCNC: 3.7 MMOL/L (ref 3.5–5.1)
PROT SERPL-MCNC: 7.1 G/DL (ref 6.4–8.2)
PROT UR QL STRIP: 30
PROTHROMBIN TIME: 12.5 SECONDS (ref 11.7–14.7)
RBC # BLD AUTO: 4.15 M/UL (ref 4.2–5.4)
RBC # BLD AUTO: 4.32 M/UL (ref 4.2–5.4)
RBC # UR STRIP: NEGATIVE /UL
RBC #/AREA URNS HPF: ABNORMAL /HPF
SODIUM SERPL-SCNC: 139 MMOL/L (ref 136–145)
SODIUM SERPL-SCNC: 139 MMOL/L (ref 136–145)
SP GR UR STRIP: 1.03
SQUAMOUS #/AREA URNS LPF: ABNORMAL /LPF
TRICHOMONAS #/AREA URNS HPF: ABNORMAL /HPF
TROPONIN I SERPL HS-MCNC: 18.7 PG/ML
UROBILINOGEN UR STRIP-ACNC: 2 MG/DL
WBC # BLD AUTO: 10.86 K/UL (ref 4.5–11)
WBC # BLD AUTO: 9.76 K/UL (ref 4.5–11)
WBC #/AREA URNS HPF: ABNORMAL /HPF
YEAST #/AREA URNS HPF: ABNORMAL /HPF

## 2023-05-20 PROCEDURE — 63600175 PHARM REV CODE 636 W HCPCS: Performed by: HOSPITALIST

## 2023-05-20 PROCEDURE — 85730 THROMBOPLASTIN TIME PARTIAL: CPT | Performed by: EMERGENCY MEDICINE

## 2023-05-20 PROCEDURE — 96374 THER/PROPH/DIAG INJ IV PUSH: CPT | Mod: 59

## 2023-05-20 PROCEDURE — 85014 HEMATOCRIT: CPT | Performed by: EMERGENCY MEDICINE

## 2023-05-20 PROCEDURE — 85018 HEMOGLOBIN: CPT | Performed by: EMERGENCY MEDICINE

## 2023-05-20 PROCEDURE — 80307 DRUG TEST PRSMV CHEM ANLYZR: CPT | Performed by: EMERGENCY MEDICINE

## 2023-05-20 PROCEDURE — 93010 ELECTROCARDIOGRAM REPORT: CPT | Mod: ,,, | Performed by: HOSPITALIST

## 2023-05-20 PROCEDURE — 25000003 PHARM REV CODE 250: Performed by: HOSPITALIST

## 2023-05-20 PROCEDURE — G0378 HOSPITAL OBSERVATION PER HR: HCPCS

## 2023-05-20 PROCEDURE — 85025 COMPLETE CBC W/AUTO DIFF WBC: CPT | Performed by: EMERGENCY MEDICINE

## 2023-05-20 PROCEDURE — 96365 THER/PROPH/DIAG IV INF INIT: CPT

## 2023-05-20 PROCEDURE — 80177 DRUG SCRN QUAN LEVETIRACETAM: CPT | Performed by: EMERGENCY MEDICINE

## 2023-05-20 PROCEDURE — 85610 PROTHROMBIN TIME: CPT | Performed by: EMERGENCY MEDICINE

## 2023-05-20 PROCEDURE — 99285 EMERGENCY DEPT VISIT HI MDM: CPT | Mod: ,,, | Performed by: EMERGENCY MEDICINE

## 2023-05-20 PROCEDURE — 81001 URINALYSIS AUTO W/SCOPE: CPT | Performed by: EMERGENCY MEDICINE

## 2023-05-20 PROCEDURE — 99285 PR EMERGENCY DEPT VISIT,LEVEL V: ICD-10-PCS | Mod: ,,, | Performed by: EMERGENCY MEDICINE

## 2023-05-20 PROCEDURE — 25000003 PHARM REV CODE 250: Performed by: EMERGENCY MEDICINE

## 2023-05-20 PROCEDURE — C9113 INJ PANTOPRAZOLE SODIUM, VIA: HCPCS | Performed by: EMERGENCY MEDICINE

## 2023-05-20 PROCEDURE — 82272 OCCULT BLD FECES 1-3 TESTS: CPT

## 2023-05-20 PROCEDURE — 80053 COMPREHEN METABOLIC PANEL: CPT | Performed by: EMERGENCY MEDICINE

## 2023-05-20 PROCEDURE — 82271 OCCULT BLOOD OTHER SOURCES: CPT | Performed by: EMERGENCY MEDICINE

## 2023-05-20 PROCEDURE — 87086 URINE CULTURE/COLONY COUNT: CPT | Performed by: EMERGENCY MEDICINE

## 2023-05-20 PROCEDURE — 96361 HYDRATE IV INFUSION ADD-ON: CPT

## 2023-05-20 PROCEDURE — 99285 EMERGENCY DEPT VISIT HI MDM: CPT | Mod: 25

## 2023-05-20 PROCEDURE — 93010 EKG 12-LEAD: ICD-10-PCS | Mod: ,,, | Performed by: HOSPITALIST

## 2023-05-20 PROCEDURE — S5010 5% DEXTROSE AND 0.45% SALINE: HCPCS | Performed by: HOSPITALIST

## 2023-05-20 PROCEDURE — 87040 BLOOD CULTURE FOR BACTERIA: CPT | Performed by: HOSPITALIST

## 2023-05-20 PROCEDURE — 99223 PR INITIAL HOSPITAL CARE,LEVL III: ICD-10-PCS | Mod: $0,AI,, | Performed by: HOSPITALIST

## 2023-05-20 PROCEDURE — 86900 BLOOD TYPING SEROLOGIC ABO: CPT | Performed by: FAMILY MEDICINE

## 2023-05-20 PROCEDURE — 96367 TX/PROPH/DG ADDL SEQ IV INF: CPT

## 2023-05-20 PROCEDURE — 80048 BASIC METABOLIC PNL TOTAL CA: CPT | Mod: XB | Performed by: EMERGENCY MEDICINE

## 2023-05-20 PROCEDURE — 99223 1ST HOSP IP/OBS HIGH 75: CPT | Mod: $0,AI,, | Performed by: HOSPITALIST

## 2023-05-20 PROCEDURE — 83880 ASSAY OF NATRIURETIC PEPTIDE: CPT | Performed by: EMERGENCY MEDICINE

## 2023-05-20 PROCEDURE — 93005 ELECTROCARDIOGRAM TRACING: CPT

## 2023-05-20 PROCEDURE — 84484 ASSAY OF TROPONIN QUANT: CPT | Performed by: EMERGENCY MEDICINE

## 2023-05-20 PROCEDURE — 63600175 PHARM REV CODE 636 W HCPCS: Performed by: EMERGENCY MEDICINE

## 2023-05-20 RX ORDER — DEXTROSE MONOHYDRATE AND SODIUM CHLORIDE 5; .45 G/100ML; G/100ML
INJECTION, SOLUTION INTRAVENOUS CONTINUOUS
Status: DISCONTINUED | OUTPATIENT
Start: 2023-05-20 | End: 2023-05-22

## 2023-05-20 RX ORDER — CYPROHEPTADINE HYDROCHLORIDE 4 MG/1
4 TABLET ORAL 3 TIMES DAILY
Status: DISCONTINUED | OUTPATIENT
Start: 2023-05-20 | End: 2023-05-22 | Stop reason: HOSPADM

## 2023-05-20 RX ORDER — CYPROHEPTADINE HYDROCHLORIDE 4 MG/1
4 TABLET ORAL 3 TIMES DAILY
COMMUNITY

## 2023-05-20 RX ORDER — BISACODYL 10 MG
10 SUPPOSITORY, RECTAL RECTAL
COMMUNITY

## 2023-05-20 RX ORDER — SODIUM CHLORIDE 9 MG/ML
INJECTION, SOLUTION INTRAVENOUS CONTINUOUS
Status: DISCONTINUED | OUTPATIENT
Start: 2023-05-20 | End: 2023-05-20

## 2023-05-20 RX ORDER — PANTOPRAZOLE SODIUM 40 MG/10ML
80 INJECTION, POWDER, LYOPHILIZED, FOR SOLUTION INTRAVENOUS
Status: COMPLETED | OUTPATIENT
Start: 2023-05-20 | End: 2023-05-20

## 2023-05-20 RX ORDER — LEVETIRACETAM 100 MG/ML
1000 SOLUTION ORAL 2 TIMES DAILY
Status: DISCONTINUED | OUTPATIENT
Start: 2023-05-20 | End: 2023-05-22 | Stop reason: HOSPADM

## 2023-05-20 RX ORDER — ACETAMINOPHEN 325 MG/1
650 TABLET ORAL EVERY 4 HOURS PRN
Status: DISCONTINUED | OUTPATIENT
Start: 2023-05-20 | End: 2023-05-22 | Stop reason: HOSPADM

## 2023-05-20 RX ORDER — LEVETIRACETAM 1000 MG/1
1000 TABLET ORAL 2 TIMES DAILY
Status: ON HOLD | COMMUNITY
Start: 2022-12-19 | End: 2023-05-22 | Stop reason: HOSPADM

## 2023-05-20 RX ORDER — SODIUM CHLORIDE 0.9 % (FLUSH) 0.9 %
10 SYRINGE (ML) INJECTION
Status: DISCONTINUED | OUTPATIENT
Start: 2023-05-20 | End: 2023-05-22 | Stop reason: HOSPADM

## 2023-05-20 RX ORDER — ONDANSETRON 2 MG/ML
4 INJECTION INTRAMUSCULAR; INTRAVENOUS EVERY 8 HOURS PRN
Status: DISCONTINUED | OUTPATIENT
Start: 2023-05-20 | End: 2023-05-22 | Stop reason: HOSPADM

## 2023-05-20 RX ORDER — ONDANSETRON 4 MG/1
4 TABLET, FILM COATED ORAL EVERY 8 HOURS PRN
COMMUNITY

## 2023-05-20 RX ORDER — FOLIC ACID 1 MG/1
1 TABLET ORAL DAILY
Status: DISCONTINUED | OUTPATIENT
Start: 2023-05-21 | End: 2023-05-22 | Stop reason: HOSPADM

## 2023-05-20 RX ORDER — PANTOPRAZOLE SODIUM 40 MG/10ML
40 INJECTION, POWDER, LYOPHILIZED, FOR SOLUTION INTRAVENOUS 2 TIMES DAILY
Status: DISCONTINUED | OUTPATIENT
Start: 2023-05-21 | End: 2023-05-22 | Stop reason: HOSPADM

## 2023-05-20 RX ORDER — TALC
6 POWDER (GRAM) TOPICAL NIGHTLY PRN
Status: DISCONTINUED | OUTPATIENT
Start: 2023-05-20 | End: 2023-05-22 | Stop reason: HOSPADM

## 2023-05-20 RX ADMIN — SODIUM CHLORIDE 1000 ML: 9 INJECTION, SOLUTION INTRAVENOUS at 07:05

## 2023-05-20 RX ADMIN — AZITHROMYCIN DIHYDRATE 500 MG: 500 INJECTION, POWDER, LYOPHILIZED, FOR SOLUTION INTRAVENOUS at 08:05

## 2023-05-20 RX ADMIN — DEXTROSE AND SODIUM CHLORIDE: 5; 450 INJECTION, SOLUTION INTRAVENOUS at 06:05

## 2023-05-20 RX ADMIN — PANTOPRAZOLE SODIUM 80 MG: 40 INJECTION, POWDER, FOR SOLUTION INTRAVENOUS at 04:05

## 2023-05-20 RX ADMIN — DEXTROSE MONOHYDRATE 1 G: 5 INJECTION INTRAVENOUS at 06:05

## 2023-05-20 NOTE — ASSESSMENT & PLAN NOTE
- PSI: 69 (Class II, 0.6-0.9% mortality)   - CXR: medial RLL PNA  - Blood and Sputum Cx pending  - MRSA negative   - Rocephin and Azithromycin

## 2023-05-20 NOTE — ED NOTES
After metros arrival, pt began to vomit. A sample was obtained to send to lab for gastric occult.

## 2023-05-20 NOTE — ASSESSMENT & PLAN NOTE
- Rockall score 1 point (2.4% mortality prior to endoscopy)  - EGD (12/15/22): ulcerative esophagitis and large hiatal hernia   - EKG: SR 90   - Vital signs and HH wnl   - Protonix IV BID  - HH Q12H  - IVF  - No GI referral as next of kin, sister, has confirmed that patient is not to have any invasive procedures including endoscopy

## 2023-05-20 NOTE — ED NOTES
Lilli from [facility] called to check on pt. States pt is on hospice for Alzheimers.

## 2023-05-20 NOTE — PROGRESS NOTES
[facility] - [facility] Tele[facility] Hospital Medicine  Progress Note    Patient Name: Suzie Hennessy  MRN: [id number]  Patient Class: OP- Observation   Admission Date: 5/20/2023  Length of Stay: 0 days  Attending Physician: Blanquita Palma MD  Primary Care Provider: Primary Doctor No        Subjective:     Principal Problem:UGIB (upper gastrointestinal bleed)        HPI:  66 yo female with PMH of ulcerative esophagitis, Schizophrenia, Alzheimer's and seizure d/o presents to [facility] ED from [facility] for vomiting blood this morning. Unable to obtain history as patient has advanced dementia. Last EGD was on 12/15/22, which showed ulcerative esophagitis and large hiatal hernia. On review of last admission, pt was discharged with comfort measures only. Was unable to contact NH and sister, who next of kin.     ED course: HH and BUN wnl at 7 am and again at noon. Vital signs stable. Pt was discharged but when EMS arrived to bring pt back to [facility], pt vomited a minimal amount of coffee ground emesis. CXR showed possible medial RLL opacities, indicating possible PNA. WBC wnl. NS x 1 L and Protonix 80 mg IV given. Pt will be admitted under the care of Dr. Palma for evaluation and treatment of UGIB.     Was able to contact sister, Allie Armstrong, who requests that pt be DNR. She would like medication treatment but no invasive procedures, including endoscopy.       Overview/Hospital Course:  No notes on file    Past Medical History:   Diagnosis Date    Altered mental state 12/13/2022    Constipation     Gastroesophageal reflux disease with esophagitis without hemorrhage 9/26/2021    GERD (gastroesophageal reflux disease)     Hematemesis with nausea 9/26/2021    Hyperlipidemia     Memory loss     Schizophrenia     Seizures     Ulcer of esophagus without bleeding 9/26/2021       Past Surgical History:   Procedure Laterality Date    EXTRACTION OF TOOTH N/A 4/25/2022    Procedure: EXTRACTION, TOOTH;   Surgeon: Rob Farris DMD, MD;  Location: [facility];  Service: Oral Surgery;  Laterality: N/A;       Review of patient's allergies indicates:   Allergen Reactions    Ativan [lorazepam]     Prolixin [fluphenazine hcl]        No current facility-administered medications on file prior to encounter.     Current Outpatient Medications on File Prior to Encounter   Medication Sig    acetaminophen (TYLENOL) 500 MG tablet Take 500 mg by mouth every 6 (six) hours as needed for Pain.    bisacodyL (DULCOLAX) 10 mg Supp Place 10 mg rectally Every 3 (three) days.    cyproheptadine (PERIACTIN) 4 mg tablet Take 4 mg by mouth 3 (three) times daily.    docusate sodium (COLACE) 100 MG capsule Take 100 mg by mouth 2 (two) times daily.    folic acid (FOLVITE) 1 MG tablet Take 1 mg by mouth once daily.    INVEGA SUSTENNA 117 mg/0.75 mL Syrg injection Inject 117 mg into the muscle every 30 days.    levETIRAcetam (KEPPRA) 1000 MG tablet Take 1,000 mg by mouth 2 (two) times daily.    multivitamin with minerals tablet Take 1 tablet by mouth once daily.    omeprazole (PRILOSEC) 40 MG capsule Take 1 capsule (40 mg total) by mouth 2 (two) times daily before meals.    ondansetron (ZOFRAN) 4 MG tablet Take 4 mg by mouth every 8 (eight) hours as needed for Nausea.    lactulose 10 gram/15 ml (CHRONULAC) 10 gram/15 mL (15 mL) solution Take 60 mLs by mouth 4 (four) times daily. 60 cc po qid     Family History       Family history is unknown by patient.          Tobacco Use    Smoking status: Never    Smokeless tobacco: Never   Substance and Sexual Activity    Alcohol use: Not Currently    Drug use: Never    Sexual activity: Not on file     Review of Systems   Unable to perform ROS: Dementia   Objective:     Vital Signs (Most Recent):  Temp: 98.8 °F (37.1 °C) (05/20/23 0702)  Pulse: 90 (05/20/23 1312)  Resp: 17 (05/20/23 0702)  BP: 138/78 (05/20/23 1312)  SpO2: 97 % (05/20/23 1312) Vital Signs (24h Range):  Temp:  [98.8 °F (37.1 °C)] 98.8 °F  (37.1 °C)  Pulse:  [] 90  Resp:  [17] 17  SpO2:  [93 %-100 %] 97 %  BP: (108-138)/(66-80) 138/78     Weight: 54.4 kg (120 lb)  Body mass index is 18.79 kg/m².     Physical Exam  Vitals and nursing note reviewed.   Constitutional:       General: She is not in acute distress.     Appearance: She is not ill-appearing, toxic-appearing or diaphoretic.   HENT:      Head: Normocephalic and atraumatic.      Right Ear: External ear normal.      Left Ear: External ear normal.      Nose: Nose normal.   Eyes:      Conjunctiva/sclera: Conjunctivae normal.   Neck:      Vascular: No carotid bruit.   Cardiovascular:      Rate and Rhythm: Normal rate and regular rhythm.      Pulses: Normal pulses.      Heart sounds: Normal heart sounds. No murmur heard.  Pulmonary:      Effort: Pulmonary effort is normal. No respiratory distress.      Breath sounds: Normal breath sounds. No wheezing, rhonchi or rales.   Abdominal:      General: Abdomen is flat. Bowel sounds are normal.      Palpations: Abdomen is soft.      Tenderness: There is no abdominal tenderness.   Musculoskeletal:      Right lower leg: No edema.      Left lower leg: No edema.   Skin:     Capillary Refill: Capillary refill takes less than 2 seconds.      Findings: No lesion or rash.   Neurological:      Mental Status: She is alert.      Comments: Unable to assess 2/2 advanced dementia   Psychiatric:      Comments: Unable to assess 2/2 advanced dementia                Significant Labs: All pertinent labs within the past 24 hours have been reviewed.    Significant Imaging: I have reviewed all pertinent imaging results/findings within the past 24 hours.      Assessment/Plan:      * UGIB (upper gastrointestinal bleed)  - Rockall score 1 point (2.4% mortality prior to endoscopy)  - EGD (12/15/22): ulcerative esophagitis and large hiatal hernia   - EKG: SR 90   - Vital signs and HH wnl   - Protonix IV BID  - HH Q12H  - IVF  - No GI referral as next of kin, sister, has  confirmed that patient is not to have any invasive procedures including endoscopy      Community acquired pneumonia of right lower lobe of lung  - PSI: 69 (Class II, 0.6-0.9% mortality)   - CXR: medial RLL PNA  - Blood and Sputum Cx pending  - Rocephin and Azithromycin      Dementia with behavioral disturbance  - Alzheimer's dementia on hospice       Schizophrenia, unspecified  - Outpatient Invega Sustenna Q30 days         Seizure disorder  - Home Keppra and level        VTE Risk Mitigation (From admission, onward)           Ordered     IP VTE LOW RISK PATIENT  Once         05/20/23 1628     Place DAHLIA hose  Until discontinued         05/20/23 1628                    Discharge Planning   AGUSTINA:      Code Status: DNR   Is the patient medically ready for discharge?:     Reason for patient still in hospital (select all that apply): Treatment                     Liset Rose DO  Department of Hospital Medicine   [facility] - [facility] Tele[facility]

## 2023-05-20 NOTE — HPI
68 yo female with PMH of ulcerative esophagitis, Schizophrenia, Alzheimer's and seizure d/o presents to [facility] ED from [facility] for vomiting blood this morning. Unable to obtain history as patient has advanced dementia. Last EGD was on 12/15/22, which showed ulcerative esophagitis and large hiatal hernia. On review of last admission, pt was discharged with comfort measures only. Was unable to contact NH and sister, who next of kin.     ED course: HH and BUN wnl at 7 am and again at noon. Vital signs stable. Pt was discharged but when EMS arrived to bring pt back to [facility], pt vomited a minimal amount of coffee ground emesis. CXR showed possible medial RLL opacities, indicating possible PNA. WBC wnl. NS x 1 L and Protonix 80 mg IV given. Pt will be admitted under the care of Dr. Palma for evaluation and treatment of UGIB and CAP.     Was able to contact sister, Allie Armstrong, who requests that pt be DNR. She would like medication treatment but no invasive procedures, including endoscopy.

## 2023-05-20 NOTE — ASSESSMENT & PLAN NOTE
- Rockall score 1 point (2.4% mortality prior to endoscopy)  - EGD (12/15/22): ulcerative esophagitis and large hiatal hernia   - EKG: SR 90   - Vital signs and HH wnl   - Protonix IV BID  - HH Q12H  - IVF  - No GI referral as patient's POA requests no invasive procedures including endoscopy

## 2023-05-20 NOTE — ED NOTES
Transport paperwork sent to [facility] for transport back to [facility]. Confirmation #: [id number]

## 2023-05-20 NOTE — ED TRIAGE NOTES
Presents to ED via EMS from [facility] for c/o vomiting blood that began this morning. Patient has hx of schizophrenia and is a poor historian.

## 2023-05-20 NOTE — ED PROVIDER NOTES
"Encounter Date: 5/20/2023       History     Chief Complaint   Patient presents with    Hematemesis     68 Y/O FEMALE SENT FROM [facility] FOR VOMITING BLOOD.  ONSET WAS A COUPLE OF HOURS AGO.  SHE HAS DARK "COFFEE GROUND" MATERIAL ON HER SHIRT.  SHE HIS A HISTORY OF SIMILAR.  SHE IS UNABLE TO PROVIDE ANY MEANINGFUL HISTORY.      Review of patient's allergies indicates:   Allergen Reactions    Ativan [lorazepam]     Prolixin [fluphenazine hcl]      Past Medical History:   Diagnosis Date    Altered mental state 12/13/2022    Constipation     Gastroesophageal reflux disease with esophagitis without hemorrhage 9/26/2021    GERD (gastroesophageal reflux disease)     Hematemesis with nausea 9/26/2021    Hyperlipidemia     Memory loss     Schizophrenia     Seizures     Ulcer of esophagus without bleeding 9/26/2021     Past Surgical History:   Procedure Laterality Date    EXTRACTION OF TOOTH N/A 4/25/2022    Procedure: EXTRACTION, TOOTH;  Surgeon: Rob Farris DMD, MD;  Location: [facility];  Service: Oral Surgery;  Laterality: N/A;     Family History   Family history unknown: Yes     Social History     Tobacco Use    Smoking status: Never    Smokeless tobacco: Never   Substance Use Topics    Alcohol use: Not Currently    Drug use: Never     Review of Systems   Unable to perform ROS: Mental status change     Physical Exam     Initial Vitals [05/20/23 0702]   BP Pulse Resp Temp SpO2   118/70 91 17 98.8 °F (37.1 °C) 96 %      MAP       --         Physical Exam    Nursing note and vitals reviewed.  Constitutional: She appears well-developed.   HENT:   Head: Normocephalic and atraumatic.   Nose: Nose normal.   Mouth/Throat: Oropharynx is clear and moist.   Eyes: Conjunctivae and EOM are normal. Pupils are equal, round, and reactive to light.   Neck: Neck supple.   Normal range of motion.  Cardiovascular:  Normal rate, regular rhythm and normal heart sounds.           Pulmonary/Chest: Breath sounds normal. "   Abdominal: Abdomen is soft. Bowel sounds are normal.   Musculoskeletal:         General: Normal range of motion.      Cervical back: Normal range of motion and neck supple.     Neurological: She is alert and oriented to person, place, and time. She has normal strength. GCS score is 15. GCS eye subscore is 4. GCS verbal subscore is 5. GCS motor subscore is 6.   Skin: Skin is warm and dry. Capillary refill takes less than 2 seconds.       Medical Screening Exam   See Full Note    ED Course   Procedures  Labs Reviewed   COMPREHENSIVE METABOLIC PANEL - Abnormal; Notable for the following components:       Result Value    Glucose 128 (*)     BUN/Creatinine Ratio 21 (*)     Albumin 3.4 (*)     All other components within normal limits   APTT - Abnormal; Notable for the following components:    PTT 24.9 (*)     All other components within normal limits   CBC WITH DIFFERENTIAL - Abnormal; Notable for the following components:    MCV 97.2 (*)     MCHC 31.9 (*)     Neutrophils % 85.4 (*)     Lymphocytes % 9.2 (*)     Eosinophils % 0.7 (*)     Neutrophils, Abs 9.28 (*)     All other components within normal limits   OCCULT BLOOD, OTHER - Abnormal; Notable for the following components:    Occult Blood, Other Positive (*)     All other components within normal limits   URINALYSIS, REFLEX TO URINE CULTURE - Abnormal; Notable for the following components:    Leukocytes, UA Trace (*)     Protein, UA 30 (*)     Urobilinogen, UA 2 (*)     All other components within normal limits   URINALYSIS, MICROSCOPIC - Abnormal; Notable for the following components:    Bacteria, UA Loaded (*)     All other components within normal limits   BASIC METABOLIC PANEL - Abnormal; Notable for the following components:    Glucose 112 (*)     Creatinine 0.48 (*)     BUN/Creatinine Ratio 21 (*)     Calcium 8.2 (*)     All other components within normal limits   CBC WITH DIFFERENTIAL - Abnormal; Notable for the following components:    RBC 4.15 (*)      MCV 97.3 (*)     MCH 31.3 (*)     Neutrophils % 84.5 (*)     Lymphocytes % 9.4 (*)     Eosinophils % 0.6 (*)     Immature Granulocytes % 0.7 (*)     Neutrophils, Abs 8.24 (*)     Lymphocytes, Absolute 0.92 (*)     Immature Granulocytes, Absolute 0.07 (*)     All other components within normal limits   PROTIME-INR - Normal   NT-PRO NATRIURETIC PEPTIDE - Normal   TROPONIN I - Normal   DRUG SCREEN, URINE (BEAKER) - Normal   CBC W/ AUTO DIFFERENTIAL    Narrative:     The following orders were created for panel order CBC auto differential.  Procedure                               Abnormality         Status                     ---------                               -----------         ------                     CBC with Differential[[id number]]        Abnormal            Final result                 Please view results for these tests on the individual orders.   CBC W/ AUTO DIFFERENTIAL    Narrative:     The following orders were created for panel order CBC auto differential.  Procedure                               Abnormality         Status                     ---------                               -----------         ------                     CBC with Differential[[id number]]        Abnormal            Final result                 Please view results for these tests on the individual orders.   LEVETIRACETAM  (KEPPRA) LEVEL   POCT OCCULT BLOOD (STOOL)        ECG Results              EKG 12-lead (In process)  Result time 05/21/23 05:31:59      In process by Interface, Lab In [facility] (05/21/23 05:31:59)                   Narrative:    Test Reason : R10.9,    Vent. Rate : 090 BPM     Atrial Rate : 000 BPM     P-R Int : 184 ms          QRS Dur : 084 ms      QT Int : 376 ms       P-R-T Axes : 063 -64 055 degrees     QTc Int : 429 ms    Sinus rhythm  Left anterior fascicular block  rSr'(V1) - probable normal variant  Anterior T wave abnormality is nonspecific  Borderline ECG      Referred By: [email or web address]   SELF            Confirmed By:                                   Imaging Results              X-Ray Chest AP Portable (Final result)  Result time 05/20/23 10:34:40      Final result by Kelvin Samuel,  (05/20/23 10:34:40)                   Impression:      As above      Electronically signed by: Kelvin Samuel  Date:    05/20/2023  Time:    10:34               Narrative:    EXAMINATION:  XR CHEST AP PORTABLE    CLINICAL HISTORY:  Dyspnea, unspecified    TECHNIQUE:  XR CHEST AP PORTABLE    COMPARISON:  2018    FINDINGS:  No lines or tubes.    Patchy airspace opacities located within the medial right lower lobe could represent pneumonia    Normal pleura.    Cardiac silhouette is similar to comparison exam.    No obvious acute bone findings.  Scoliosis                                       Medications   cyproheptadine 4 mg tablet 4 mg (4 mg Oral Not Given 5/22/23 1500)   folic acid tablet 1 mg (1 mg Oral Not Given 5/22/23 0900)   levetiracetam 500 mg/5 mL (5 mL) liquid Soln 1,000 mg (1,000 mg Oral Not Given 5/22/23 0900)   sodium chloride 0.9% flush 10 mL (has no administration in time range)   acetaminophen tablet 650 mg (has no administration in time range)   ondansetron injection 4 mg (has no administration in time range)   pantoprazole injection 40 mg (40 mg Intravenous Not Given 5/22/23 0900)   melatonin tablet 6 mg (has no administration in time range)   multivitamin tablet (1 tablet Oral Not Given 5/22/23 0900)   cefTRIAXone (ROCEPHIN) 1 g in dextrose 5 % in water (D5W) 5 % 50 mL IVPB (MB+) (0 g Intravenous Stopped 5/21/23 1854)   azithromycin (ZITHROMAX) 500 mg in dextrose 5 % (D5W) 250 mL IVPB (0 mg Intravenous Stopped 5/21/23 2106)   potassium chloride SA CR tablet 40 mEq (40 mEq Oral Not Given 5/22/23 1500)   sodium chloride 0.9% bolus 1,000 mL 1,000 mL (0 mLs Intravenous Stopped 5/20/23 0811)   pantoprazole injection 80 mg (80 mg Intravenous Given 5/20/23 1607)   lactated ringers bolus 500 mL (0 mLs Intravenous  Stopped 5/21/23 0425)   lactated ringers bolus 500 mL (0 mLs Intravenous Stopped 5/21/23 0601)     Medical Decision Making:   Initial Assessment:   COFFEE GROUND EMESIS  Differential Diagnosis:   DDX:  UPPER GI BLEEDING VS OTHER  Clinical Tests:   Lab Tests: Ordered and Reviewed  ED Management:  DX:  UPPER GI BLEED.  OBS                       Clinical Impression:   Final diagnoses:  [R06.00] Dyspnea  [R11.10] Vomiting, unspecified vomiting type, unspecified whether nausea present (Primary)  [R10.9] Abdominal pain        ED Disposition Condition    Observation                   Thomas Street MD  05/22/23 8445

## 2023-05-21 LAB
ANION GAP SERPL CALCULATED.3IONS-SCNC: 12 MMOL/L (ref 7–16)
BASOPHILS # BLD AUTO: 0.02 K/UL (ref 0–0.2)
BASOPHILS NFR BLD AUTO: 0.2 % (ref 0–1)
BUN SERPL-MCNC: 11 MG/DL (ref 7–18)
BUN/CREAT SERPL: 17 (ref 6–20)
CALCIUM SERPL-MCNC: 8.2 MG/DL (ref 8.5–10.1)
CHLORIDE SERPL-SCNC: 106 MMOL/L (ref 98–107)
CO2 SERPL-SCNC: 24 MMOL/L (ref 21–32)
CREAT SERPL-MCNC: 0.64 MG/DL (ref 0.55–1.02)
DIFFERENTIAL METHOD BLD: ABNORMAL
EGFR (NO RACE VARIABLE) (RUSH/TITUS): 96 ML/MIN/1.73M2
EOSINOPHIL # BLD AUTO: 0.26 K/UL (ref 0–0.5)
EOSINOPHIL NFR BLD AUTO: 2.8 % (ref 1–4)
ERYTHROCYTE [DISTWIDTH] IN BLOOD BY AUTOMATED COUNT: 13.3 % (ref 11.5–14.5)
GLUCOSE SERPL-MCNC: 101 MG/DL (ref 74–106)
HCT VFR BLD AUTO: 35.1 % (ref 38–47)
HGB BLD-MCNC: 11.4 G/DL (ref 12–16)
IMM GRANULOCYTES # BLD AUTO: 0.02 K/UL (ref 0–0.04)
IMM GRANULOCYTES NFR BLD: 0.2 % (ref 0–0.4)
INDIRECT COOMBS: NORMAL
LYMPHOCYTES # BLD AUTO: 1.35 K/UL (ref 1–4.8)
LYMPHOCYTES NFR BLD AUTO: 14.7 % (ref 27–41)
MCH RBC QN AUTO: 31.6 PG (ref 27–31)
MCHC RBC AUTO-ENTMCNC: 32.5 G/DL (ref 32–36)
MCV RBC AUTO: 97.2 FL (ref 80–96)
METHICILLIN RESISTANT STAPHYLOCOCCUS AUREUS: NEGATIVE
MONOCYTES # BLD AUTO: 0.43 K/UL (ref 0–0.8)
MONOCYTES NFR BLD AUTO: 4.7 % (ref 2–6)
MPC BLD CALC-MCNC: 11.1 FL (ref 9.4–12.4)
NEUTROPHILS # BLD AUTO: 7.09 K/UL (ref 1.8–7.7)
NEUTROPHILS NFR BLD AUTO: 77.4 % (ref 53–65)
NRBC # BLD AUTO: 0 X10E3/UL
NRBC, AUTO (.00): 0 %
PLATELET # BLD AUTO: 154 K/UL (ref 150–400)
POTASSIUM SERPL-SCNC: 4.1 MMOL/L (ref 3.5–5.1)
RBC # BLD AUTO: 3.61 M/UL (ref 4.2–5.4)
RH BLD: NORMAL
SODIUM SERPL-SCNC: 138 MMOL/L (ref 136–145)
SPECIMEN OUTDATE: NORMAL
WBC # BLD AUTO: 9.17 K/UL (ref 4.5–11)

## 2023-05-21 PROCEDURE — 25000003 PHARM REV CODE 250: Performed by: HOSPITALIST

## 2023-05-21 PROCEDURE — C9113 INJ PANTOPRAZOLE SODIUM, VIA: HCPCS | Performed by: EMERGENCY MEDICINE

## 2023-05-21 PROCEDURE — 25000003 PHARM REV CODE 250: Performed by: EMERGENCY MEDICINE

## 2023-05-21 PROCEDURE — 63600175 PHARM REV CODE 636 W HCPCS: Performed by: GENERAL PRACTICE

## 2023-05-21 PROCEDURE — 99233 PR SUBSEQUENT HOSPITAL CARE,LEVL III: ICD-10-PCS | Mod: ,,, | Performed by: HOSPITALIST

## 2023-05-21 PROCEDURE — G0378 HOSPITAL OBSERVATION PER HR: HCPCS

## 2023-05-21 PROCEDURE — 96376 TX/PRO/DX INJ SAME DRUG ADON: CPT

## 2023-05-21 PROCEDURE — S5010 5% DEXTROSE AND 0.45% SALINE: HCPCS | Performed by: GENERAL PRACTICE

## 2023-05-21 PROCEDURE — 96361 HYDRATE IV INFUSION ADD-ON: CPT

## 2023-05-21 PROCEDURE — 63600175 PHARM REV CODE 636 W HCPCS: Performed by: HOSPITALIST

## 2023-05-21 PROCEDURE — 80048 BASIC METABOLIC PNL TOTAL CA: CPT | Performed by: EMERGENCY MEDICINE

## 2023-05-21 PROCEDURE — 63600175 PHARM REV CODE 636 W HCPCS: Performed by: EMERGENCY MEDICINE

## 2023-05-21 PROCEDURE — 85025 COMPLETE CBC W/AUTO DIFF WBC: CPT | Performed by: EMERGENCY MEDICINE

## 2023-05-21 PROCEDURE — 87641 MR-STAPH DNA AMP PROBE: CPT | Performed by: HOSPITALIST

## 2023-05-21 PROCEDURE — 25000003 PHARM REV CODE 250: Performed by: GENERAL PRACTICE

## 2023-05-21 PROCEDURE — 99233 SBSQ HOSP IP/OBS HIGH 50: CPT | Mod: ,,, | Performed by: HOSPITALIST

## 2023-05-21 PROCEDURE — 96366 THER/PROPH/DIAG IV INF ADDON: CPT

## 2023-05-21 RX ADMIN — PANTOPRAZOLE SODIUM 40 MG: 40 INJECTION, POWDER, FOR SOLUTION INTRAVENOUS at 08:05

## 2023-05-21 RX ADMIN — AZITHROMYCIN DIHYDRATE 500 MG: 500 INJECTION, POWDER, LYOPHILIZED, FOR SOLUTION INTRAVENOUS at 08:05

## 2023-05-21 RX ADMIN — SODIUM CHLORIDE, POTASSIUM CHLORIDE, SODIUM LACTATE AND CALCIUM CHLORIDE 500 ML: 600; 310; 30; 20 INJECTION, SOLUTION INTRAVENOUS at 05:05

## 2023-05-21 RX ADMIN — DEXTROSE MONOHYDRATE 1 G: 5 INJECTION INTRAVENOUS at 06:05

## 2023-05-21 RX ADMIN — PANTOPRAZOLE SODIUM 40 MG: 40 INJECTION, POWDER, FOR SOLUTION INTRAVENOUS at 03:05

## 2023-05-21 RX ADMIN — SODIUM CHLORIDE, POTASSIUM CHLORIDE, SODIUM LACTATE AND CALCIUM CHLORIDE 500 ML: 600; 310; 30; 20 INJECTION, SOLUTION INTRAVENOUS at 03:05

## 2023-05-21 RX ADMIN — DEXTROSE AND SODIUM CHLORIDE: 5; 450 INJECTION, SOLUTION INTRAVENOUS at 01:05

## 2023-05-21 NOTE — PLAN OF CARE
Problem: Adult Inpatient Plan of Care  Goal: Plan of Care Review  Outcome: Ongoing, Progressing  Goal: Patient-Specific Goal (Individualized)  Outcome: Ongoing, Progressing  Goal: Absence of Hospital-Acquired Illness or Injury  Outcome: Ongoing, Progressing  Goal: Optimal Comfort and Wellbeing  Outcome: Ongoing, Progressing  Goal: Readiness for Transition of Care  Outcome: Ongoing, Progressing     Problem: Skin Injury Risk Increased  Goal: Skin Health and Integrity  Outcome: Ongoing, Progressing     Problem: Fluid Imbalance (Pneumonia)  Goal: Fluid Balance  Outcome: Ongoing, Progressing     Problem: Infection (Pneumonia)  Goal: Resolution of Infection Signs and Symptoms  Outcome: Ongoing, Progressing     Problem: Respiratory Compromise (Pneumonia)  Goal: Effective Oxygenation and Ventilation  Outcome: Ongoing, Progressing

## 2023-05-21 NOTE — SUBJECTIVE & OBJECTIVE
Interval History: Had hypotensive episode last night with BP 92/50, which increased to 97/49 after 500 cc bolus. She was then given another 500 cc bolus. And BP increased to 103/49. IVF rate was increased to 95 cc/hr. Pt has only eaten 3 bites of her breakfast. Nurse reports that patient refused to eat even when fed. Pt reported that she does not eat food.     Review of Systems   Unable to perform ROS: Dementia   Objective:     Vital Signs (Most Recent):  Temp: 98.5 °F (36.9 °C) (05/21/23 1105)  Pulse: 82 (05/21/23 1105)  Resp: 18 (05/21/23 1105)  BP: 119/63 (05/21/23 1105)  SpO2: (!) 93 % (05/21/23 1105) Vital Signs (24h Range):  Temp:  [96.3 °F (35.7 °C)-98.5 °F (36.9 °C)] 98.5 °F (36.9 °C)  Pulse:  [81-91] 82  Resp:  [14-18] 18  SpO2:  [92 %-95 %] 93 %  BP: ()/(49-69) 119/63     Weight: 54.4 kg (120 lb)  Body mass index is 18.79 kg/m².    Intake/Output Summary (Last 24 hours) at 5/21/2023 1524  Last data filed at 5/21/2023 0756  Gross per 24 hour   Intake --   Output 1 ml   Net -1 ml         Physical Exam  Vitals and nursing note reviewed.   Constitutional:       General: She is not in acute distress.     Appearance: She is not ill-appearing, toxic-appearing or diaphoretic.   HENT:      Head: Normocephalic and atraumatic.      Right Ear: External ear normal.      Left Ear: External ear normal.      Nose: Nose normal.   Eyes:      Conjunctiva/sclera: Conjunctivae normal.   Neck:      Vascular: No carotid bruit.   Cardiovascular:      Rate and Rhythm: Normal rate and regular rhythm.      Pulses: Normal pulses.      Heart sounds: Normal heart sounds. No murmur heard.  Pulmonary:      Effort: Pulmonary effort is normal. No respiratory distress.      Breath sounds: Normal breath sounds. No wheezing, rhonchi or rales.   Abdominal:      General: Abdomen is flat. Bowel sounds are normal.      Palpations: Abdomen is soft.      Tenderness: There is no abdominal tenderness.   Musculoskeletal:      Right lower leg: No  edema.      Left lower leg: No edema.   Skin:     Capillary Refill: Capillary refill takes less than 2 seconds.      Findings: No lesion or rash.   Neurological:      Mental Status: She is alert.      Comments: Unable to assess 2/2 advanced dementia   Psychiatric:      Comments: Unable to assess 2/2 advanced dementia             Significant Labs: All pertinent labs within the past 24 hours have been reviewed.    Significant Imaging: I have reviewed all pertinent imaging results/findings within the past 24 hours.

## 2023-05-21 NOTE — PROGRESS NOTES
[facility] Hospital Medicine  Progress Note    Patient Name: Suzie Hennessy  MRN: [id number]  Patient Class: OP- Observation   Admission Date: 5/20/2023  Length of Stay: 0 days  Attending Physician: Blanquita Palma MD  Primary Care Provider: Primary Doctor No        Subjective:     Principal Problem:UGIB (upper gastrointestinal bleed)        HPI:  68 yo female with PMH of ulcerative esophagitis, Schizophrenia, Alzheimer's and seizure d/o presents to [facility] ED from [facility] for vomiting blood this morning. Unable to obtain history as patient has advanced dementia. Last EGD was on 12/15/22, which showed ulcerative esophagitis and large hiatal hernia. On review of last admission, pt was discharged with comfort measures only. Was unable to contact NH and sister, who next of kin.     ED course: HH and BUN wnl at 7 am and again at noon. Vital signs stable. Pt was discharged but when EMS arrived to bring pt back to [facility], pt vomited a minimal amount of coffee ground emesis. CXR showed possible medial RLL opacities, indicating possible PNA. WBC wnl. NS x 1 L and Protonix 80 mg IV given. Pt will be admitted under the care of Dr. Palma for evaluation and treatment of UGIB.     Was able to contact sister, Allie Armstrong, who requests that pt be DNR. She would like medication treatment but no invasive procedures, including endoscopy.         Overview/Hospital Course:  No notes on file    Interval History: Had hypotensive episode last night with BP 92/50, which increased to 97/49 after 500 cc bolus. She was then given another 500 cc bolus. And BP increased to 103/49. IVF rate was increased to 95 cc/hr. Pt has only eaten 3 bites of her breakfast. Nurse reports that patient refused to eat even when fed. Pt reported that she does not eat food.     Review of Systems   Unable to perform ROS: Dementia   Objective:     Vital Signs (Most Recent):  Temp: 98.5 °F (36.9 °C) (05/21/23  1105)  Pulse: 82 (05/21/23 1105)  Resp: 18 (05/21/23 1105)  BP: 119/63 (05/21/23 1105)  SpO2: (!) 93 % (05/21/23 1105) Vital Signs (24h Range):  Temp:  [96.3 °F (35.7 °C)-98.5 °F (36.9 °C)] 98.5 °F (36.9 °C)  Pulse:  [81-91] 82  Resp:  [14-18] 18  SpO2:  [92 %-95 %] 93 %  BP: ()/(49-69) 119/63     Weight: 54.4 kg (120 lb)  Body mass index is 18.79 kg/m².    Intake/Output Summary (Last 24 hours) at 5/21/2023 1524  Last data filed at 5/21/2023 0756  Gross per 24 hour   Intake --   Output 1 ml   Net -1 ml         Physical Exam  Vitals and nursing note reviewed.   Constitutional:       General: She is not in acute distress.     Appearance: She is not ill-appearing, toxic-appearing or diaphoretic.   HENT:      Head: Normocephalic and atraumatic.      Right Ear: External ear normal.      Left Ear: External ear normal.      Nose: Nose normal.   Eyes:      Conjunctiva/sclera: Conjunctivae normal.   Neck:      Vascular: No carotid bruit.   Cardiovascular:      Rate and Rhythm: Normal rate and regular rhythm.      Pulses: Normal pulses.      Heart sounds: Normal heart sounds. No murmur heard.  Pulmonary:      Effort: Pulmonary effort is normal. No respiratory distress.      Breath sounds: Normal breath sounds. No wheezing, rhonchi or rales.   Abdominal:      General: Abdomen is flat. Bowel sounds are normal.      Palpations: Abdomen is soft.      Tenderness: There is no abdominal tenderness.   Musculoskeletal:      Right lower leg: No edema.      Left lower leg: No edema.   Skin:     Capillary Refill: Capillary refill takes less than 2 seconds.      Findings: No lesion or rash.   Neurological:      Mental Status: She is alert.      Comments: Unable to assess 2/2 advanced dementia   Psychiatric:      Comments: Unable to assess 2/2 advanced dementia             Significant Labs: All pertinent labs within the past 24 hours have been reviewed.    Significant Imaging: I have reviewed all pertinent imaging results/findings  within the past 24 hours.      Assessment/Plan:      * UGIB (upper gastrointestinal bleed)  - Rockall score 1 point (2.4% mortality prior to endoscopy)  - EGD (12/15/22): ulcerative esophagitis and large hiatal hernia   - EKG: SR 90   - Vital signs and HH wnl   - Protonix IV BID  - HH Q12H  - IVF  - No GI referral as next of kin, sister, has confirmed that patient is not to have any invasive procedures including endoscopy      Community acquired pneumonia of right lower lobe of lung  - PSI: 69 (Class II, 0.6-0.9% mortality)   - CXR: medial RLL PNA  - Blood and Sputum Cx pending  - MRSA negative   - Rocephin and Azithromycin      Dementia with behavioral disturbance  - Alzheimer's dementia on hospice       Schizophrenia, unspecified  - Outpatient Invega Sustenna Q30 days         Seizure disorder  - Home Keppra and level        VTE Risk Mitigation (From admission, onward)         Ordered     IP VTE LOW RISK PATIENT  Once         05/20/23 1628     Place DAHLIA hose  Until discontinued         05/20/23 1628                Discharge Planning   AGUSTINA:      Code Status: DNR   Is the patient medically ready for discharge?:     Reason for patient still in hospital (select all that apply): Treatment                     Liset Rose DO  Department of Hospital Medicine   [facility]

## 2023-05-21 NOTE — PLAN OF CARE
Problem: Adult Inpatient Plan of Care  Goal: Plan of Care Review  Outcome: Ongoing, Progressing     Problem: Skin Injury Risk Increased  Goal: Skin Health and Integrity  Outcome: Ongoing, Progressing     Problem: Infection (Pneumonia)  Goal: Resolution of Infection Signs and Symptoms  Outcome: Ongoing, Progressing

## 2023-05-22 VITALS
RESPIRATION RATE: 18 BRPM | WEIGHT: 120 LBS | HEART RATE: 74 BPM | HEIGHT: 67 IN | DIASTOLIC BLOOD PRESSURE: 51 MMHG | SYSTOLIC BLOOD PRESSURE: 98 MMHG | TEMPERATURE: 98 F | BODY MASS INDEX: 18.83 KG/M2 | OXYGEN SATURATION: 95 %

## 2023-05-22 LAB
ANION GAP SERPL CALCULATED.3IONS-SCNC: 9 MMOL/L (ref 7–16)
BASOPHILS # BLD AUTO: 0.02 K/UL (ref 0–0.2)
BASOPHILS # BLD AUTO: 0.02 K/UL (ref 0–0.2)
BASOPHILS NFR BLD AUTO: 0.3 % (ref 0–1)
BASOPHILS NFR BLD AUTO: 0.3 % (ref 0–1)
BUN SERPL-MCNC: 4 MG/DL (ref 7–18)
BUN/CREAT SERPL: 8 (ref 6–20)
CALCIUM SERPL-MCNC: 8 MG/DL (ref 8.5–10.1)
CHLORIDE SERPL-SCNC: 109 MMOL/L (ref 98–107)
CO2 SERPL-SCNC: 27 MMOL/L (ref 21–32)
CREAT SERPL-MCNC: 0.53 MG/DL (ref 0.55–1.02)
DIFFERENTIAL METHOD BLD: ABNORMAL
DIFFERENTIAL METHOD BLD: ABNORMAL
EGFR (NO RACE VARIABLE) (RUSH/TITUS): 100 ML/MIN/1.73M2
EOSINOPHIL # BLD AUTO: 0.15 K/UL (ref 0–0.5)
EOSINOPHIL # BLD AUTO: 0.26 K/UL (ref 0–0.5)
EOSINOPHIL NFR BLD AUTO: 2.3 % (ref 1–4)
EOSINOPHIL NFR BLD AUTO: 4.4 % (ref 1–4)
ERYTHROCYTE [DISTWIDTH] IN BLOOD BY AUTOMATED COUNT: 13.2 % (ref 11.5–14.5)
ERYTHROCYTE [DISTWIDTH] IN BLOOD BY AUTOMATED COUNT: 13.2 % (ref 11.5–14.5)
GLUCOSE SERPL-MCNC: 102 MG/DL (ref 74–106)
HCT VFR BLD AUTO: 35.1 % (ref 38–47)
HCT VFR BLD AUTO: 35.2 % (ref 38–47)
HGB BLD-MCNC: 11.3 G/DL (ref 12–16)
HGB BLD-MCNC: 11.6 G/DL (ref 12–16)
IMM GRANULOCYTES # BLD AUTO: 0.01 K/UL (ref 0–0.04)
IMM GRANULOCYTES # BLD AUTO: 0.02 K/UL (ref 0–0.04)
IMM GRANULOCYTES NFR BLD: 0.2 % (ref 0–0.4)
IMM GRANULOCYTES NFR BLD: 0.3 % (ref 0–0.4)
LYMPHOCYTES # BLD AUTO: 1.38 K/UL (ref 1–4.8)
LYMPHOCYTES # BLD AUTO: 1.49 K/UL (ref 1–4.8)
LYMPHOCYTES NFR BLD AUTO: 22.5 % (ref 27–41)
LYMPHOCYTES NFR BLD AUTO: 23.4 % (ref 27–41)
MCH RBC QN AUTO: 31.4 PG (ref 27–31)
MCH RBC QN AUTO: 32 PG (ref 27–31)
MCHC RBC AUTO-ENTMCNC: 32.1 G/DL (ref 32–36)
MCHC RBC AUTO-ENTMCNC: 33 G/DL (ref 32–36)
MCV RBC AUTO: 97 FL (ref 80–96)
MCV RBC AUTO: 97.8 FL (ref 80–96)
MONOCYTES # BLD AUTO: 0.4 K/UL (ref 0–0.8)
MONOCYTES # BLD AUTO: 0.46 K/UL (ref 0–0.8)
MONOCYTES NFR BLD AUTO: 6.8 % (ref 2–6)
MONOCYTES NFR BLD AUTO: 7 % (ref 2–6)
MPC BLD CALC-MCNC: 11.1 FL (ref 9.4–12.4)
MPC BLD CALC-MCNC: 11.2 FL (ref 9.4–12.4)
NEUTROPHILS # BLD AUTO: 3.82 K/UL (ref 1.8–7.7)
NEUTROPHILS # BLD AUTO: 4.48 K/UL (ref 1.8–7.7)
NEUTROPHILS NFR BLD AUTO: 64.8 % (ref 53–65)
NEUTROPHILS NFR BLD AUTO: 67.7 % (ref 53–65)
NRBC # BLD AUTO: 0 X10E3/UL
NRBC # BLD AUTO: 0 X10E3/UL
NRBC, AUTO (.00): 0 %
NRBC, AUTO (.00): 0 %
PLATELET # BLD AUTO: 122 K/UL (ref 150–400)
PLATELET # BLD AUTO: 167 K/UL (ref 150–400)
POTASSIUM SERPL-SCNC: 3.2 MMOL/L (ref 3.5–5.1)
RBC # BLD AUTO: 3.6 M/UL (ref 4.2–5.4)
RBC # BLD AUTO: 3.62 M/UL (ref 4.2–5.4)
SODIUM SERPL-SCNC: 142 MMOL/L (ref 136–145)
UA COMPLETE W REFLEX CULTURE PNL UR: NORMAL
WBC # BLD AUTO: 5.9 K/UL (ref 4.5–11)
WBC # BLD AUTO: 6.61 K/UL (ref 4.5–11)

## 2023-05-22 PROCEDURE — 99239 PR HOSPITAL DISCHARGE DAY,>30 MIN: ICD-10-PCS | Mod: ,,, | Performed by: HOSPITALIST

## 2023-05-22 PROCEDURE — 94761 N-INVAS EAR/PLS OXIMETRY MLT: CPT

## 2023-05-22 PROCEDURE — 85025 COMPLETE CBC W/AUTO DIFF WBC: CPT | Performed by: HOSPITALIST

## 2023-05-22 PROCEDURE — S5010 5% DEXTROSE AND 0.45% SALINE: HCPCS | Performed by: GENERAL PRACTICE

## 2023-05-22 PROCEDURE — 99239 HOSP IP/OBS DSCHRG MGMT >30: CPT | Mod: ,,, | Performed by: HOSPITALIST

## 2023-05-22 PROCEDURE — 80048 BASIC METABOLIC PNL TOTAL CA: CPT | Performed by: EMERGENCY MEDICINE

## 2023-05-22 PROCEDURE — 25000003 PHARM REV CODE 250: Performed by: GENERAL PRACTICE

## 2023-05-22 PROCEDURE — 96361 HYDRATE IV INFUSION ADD-ON: CPT

## 2023-05-22 PROCEDURE — G0378 HOSPITAL OBSERVATION PER HR: HCPCS

## 2023-05-22 RX ORDER — AMOXICILLIN AND CLAVULANATE POTASSIUM 875; 125 MG/1; MG/1
1 TABLET, FILM COATED ORAL EVERY 12 HOURS
Qty: 14 TABLET | Refills: 0
Start: 2023-05-22 | End: 2023-05-29

## 2023-05-22 RX ORDER — POTASSIUM CHLORIDE 20 MEQ/1
40 TABLET, EXTENDED RELEASE ORAL ONCE
Status: DISCONTINUED | OUTPATIENT
Start: 2023-05-22 | End: 2023-05-22

## 2023-05-22 RX ORDER — POTASSIUM CHLORIDE 20 MEQ/1
40 TABLET, EXTENDED RELEASE ORAL 3 TIMES DAILY
Status: DISCONTINUED | OUTPATIENT
Start: 2023-05-22 | End: 2023-05-22 | Stop reason: HOSPADM

## 2023-05-22 RX ORDER — PANTOPRAZOLE SODIUM 40 MG/1
40 TABLET, DELAYED RELEASE ORAL 2 TIMES DAILY
Qty: 60 TABLET | Refills: 0
Start: 2023-05-22 | End: 2023-06-21

## 2023-05-22 RX ORDER — LEVETIRACETAM 1000 MG/1
1000 TABLET ORAL 2 TIMES DAILY
Qty: 60 TABLET | Refills: 0 | Status: SHIPPED | OUTPATIENT
Start: 2023-05-22 | End: 2024-05-21

## 2023-05-22 RX ADMIN — DEXTROSE AND SODIUM CHLORIDE: 5; 450 INJECTION, SOLUTION INTRAVENOUS at 01:05

## 2023-05-22 NOTE — HOSPITAL COURSE
Protonix given for GIB. Patient's HH stayed stable. Rocephin and Azithromycin given for RLL CAP. Patient had an episode of hypotension, which resolved with 1 liter bolus and has remained stable since. Patient has reached maximum benefit of inpatient stay and is stable to be discharged back to [facility] with follow up with PCP.

## 2023-05-22 NOTE — PLAN OF CARE
Problem: Adult Inpatient Plan of Care  Goal: Plan of Care Review  5/22/2023 1741 by Latasha Jorgensen RN  Outcome: Met  5/22/2023 0732 by Latasha Jorgensen RN  Outcome: Ongoing, Progressing  Goal: Patient-Specific Goal (Individualized)  5/22/2023 1741 by Latasha Jorgensen RN  Outcome: Met  5/22/2023 0732 by Latasha Jorgensen RN  Outcome: Ongoing, Progressing  Goal: Absence of Hospital-Acquired Illness or Injury  5/22/2023 1741 by Latasha Jorgensen RN  Outcome: Met  5/22/2023 0732 by Latasha Jorgensen RN  Outcome: Ongoing, Progressing  Goal: Optimal Comfort and Wellbeing  5/22/2023 1741 by Latasha Jorgensen RN  Outcome: Met  5/22/2023 0732 by Latasha Jorgensen RN  Outcome: Ongoing, Progressing  Goal: Readiness for Transition of Care  5/22/2023 1741 by Latasha Jorgensen RN  Outcome: Met  5/22/2023 0732 by Latasha Jorgensen RN  Outcome: Ongoing, Progressing     Problem: Skin Injury Risk Increased  Goal: Skin Health and Integrity  5/22/2023 1741 by Latasha Jorgensen RN  Outcome: Met  5/22/2023 0732 by Latasha Jorgensen RN  Outcome: Ongoing, Progressing     Problem: Fluid Imbalance (Pneumonia)  Goal: Fluid Balance  5/22/2023 1741 by Latasha Jorgensen RN  Outcome: Met  5/22/2023 0732 by Latasha Jorgensen RN  Outcome: Ongoing, Progressing     Problem: Infection (Pneumonia)  Goal: Resolution of Infection Signs and Symptoms  5/22/2023 1741 by Latasha Jorgensen RN  Outcome: Met  5/22/2023 0732 by Latasha Jorgensen RN  Outcome: Ongoing, Progressing     Problem: Respiratory Compromise (Pneumonia)  Goal: Effective Oxygenation and Ventilation  5/22/2023 1741 by Latasha Jorgensen RN  Outcome: Met  5/22/2023 0732 by Latasha Jorgensen RN  Outcome: Ongoing, Progressing

## 2023-05-22 NOTE — PLAN OF CARE
Problem: Adult Inpatient Plan of Care  Goal: Plan of Care Review  Outcome: Ongoing, Progressing  Goal: Patient-Specific Goal (Individualized)  Outcome: Ongoing, Progressing  Goal: Absence of Hospital-Acquired Illness or Injury  Outcome: Ongoing, Progressing  Goal: Optimal Comfort and Wellbeing  Outcome: Ongoing, Progressing  Goal: Readiness for Transition of Care  Outcome: Ongoing, Progressing     Problem: Fluid Imbalance (Pneumonia)  Goal: Fluid Balance  Outcome: Ongoing, Progressing     Problem: Respiratory Compromise (Pneumonia)  Goal: Effective Oxygenation and Ventilation  Outcome: Ongoing, Progressing

## 2023-05-23 LAB — LEVETIRACETAM SERPL-MCNC: <2 ΜG/ML (ref 12–46)

## 2023-05-24 NOTE — PROGRESS NOTES
Tried calling both GEOFFREY RAYA and her sister to report low level of Keppra. Left message for both. Will try again later.

## 2023-05-26 LAB
BACTERIA BLD CULT: NORMAL
BACTERIA BLD CULT: NORMAL

## 2023-05-26 NOTE — H&P
[facility] Hospital Medicine  History & Physical    Patient Name: Suzie Hennessy  MRN: [id number]  Patient Class: OP- Observation          Admission Date: 5/20/2023  Length of Stay: 0 days  Attending Physician: Blanquita Palma MD  Primary Care Provider: Primary Doctor No       Patient information was obtained from patient, past medical records and ER records.     Subjective:     Principal Problem:UGIB (upper gastrointestinal bleed)    Chief Complaint:   Chief Complaint   Patient presents with    Hematemesis        HPI: 66 yo female with PMH of ulcerative esophagitis, Schizophrenia, Alzheimer's and seizure d/o presents to [facility] ED from [facility] for vomiting blood this morning. Unable to obtain history as patient has advanced dementia. Last EGD was on 12/15/22, which showed ulcerative esophagitis and large hiatal hernia. On review of last admission, pt was discharged with comfort measures only. Was unable to contact NH and sister, who next of kin.     ED course: HH and BUN wnl at 7 am and again at noon. Vital signs stable. Pt was discharged but when EMS arrived to bring pt back to [facility], pt vomited a minimal amount of coffee ground emesis. CXR showed possible medial RLL opacities, indicating possible PNA. WBC wnl. NS x 1 L and Protonix 80 mg IV given. Pt will be admitted under the care of Dr. Palma for evaluation and treatment of UGIB and CAP.     Was able to contact sister, Allie Armstrong, who requests that pt be DNR. She would like medication treatment but no invasive procedures, including endoscopy.         Objective:      Vital Signs (Most Recent):  Temp: 98.8 °F (37.1 °C) (05/20/23 0702)  Pulse: 90 (05/20/23 1312)  Resp: 17 (05/20/23 0702)  BP: 138/78 (05/20/23 1312)  SpO2: 97 % (05/20/23 1312) Vital Signs (24h Range):  Temp:  [98.8 °F (37.1 °C)] 98.8 °F (37.1 °C)  Pulse:  [] 90  Resp:  [17] 17  SpO2:  [93 %-100 %] 97 %  BP: (108-138)/(66-80) 138/78       Weight: 54.4 kg (120 lb)  Body mass index is 18.79 kg/m².     Physical Exam  Vitals and nursing note reviewed.   Constitutional:       General: She is not in acute distress.     Appearance: She is not ill-appearing, toxic-appearing or diaphoretic.   HENT:      Head: Normocephalic and atraumatic.      Right Ear: External ear normal.      Left Ear: External ear normal.      Nose: Nose normal.   Eyes:      Conjunctiva/sclera: Conjunctivae normal.   Neck:      Vascular: No carotid bruit.   Cardiovascular:      Rate and Rhythm: Normal rate and regular rhythm.      Pulses: Normal pulses.      Heart sounds: Normal heart sounds. No murmur heard.  Pulmonary:      Effort: Pulmonary effort is normal. No respiratory distress.      Breath sounds: Normal breath sounds. No wheezing, rhonchi or rales.   Abdominal:      General: Abdomen is flat. Bowel sounds are normal.      Palpations: Abdomen is soft.      Tenderness: There is no abdominal tenderness.   Musculoskeletal:      Right lower leg: No edema.      Left lower leg: No edema.   Skin:     Capillary Refill: Capillary refill takes less than 2 seconds.      Findings: No lesion or rash.   Neurological:      Mental Status: She is alert.      Comments: Unable to assess 2/2 advanced dementia   Psychiatric:      Comments: Unable to assess 2/2 advanced dementia    Assessment/Plan:     * UGIB (upper gastrointestinal bleed)  - Rockall score 1 point (2.4% mortality prior to endoscopy)  - EGD (12/15/22): ulcerative esophagitis and large hiatal hernia   - EKG: SR 90   - Vital signs and HH wnl   - Protonix IV BID  - HH Q12H  - IVF  - No GI referral as next of kin, sister, has confirmed that patient is not to have any invasive procedures including endoscopy        Community acquired pneumonia of right lower lobe of lung  - PSI: 69 (Class II, 0.6-0.9% mortality)   - CXR: medial RLL PNA  - Blood and Sputum Cx pending  - Rocephin and Azithromycin        Dementia with behavioral disturbance  -  Alzheimer's dementia on hospice         Schizophrenia, unspecified  - Outpatient Invega Sustenna Q30 days            Seizure disorder  - Home Keppra and level           VTE Risk Mitigation (From admission, onward)              Ordered       IP VTE LOW RISK PATIENT  Once         05/20/23 1628       Place DAHLIA hose  Until discontinued         05/20/23 1628                          Discharge Planning   AGUSTINA:      Code Status: DNR   Is the patient medically ready for discharge?:     Reason for patient still in hospital (select all that apply): Treatment              On 5/20/23, patient should be placed in hospital observation services under my care in collaboration with Dr. Palma.    Liset Rose DO  Department of Hospital Medicine  [facility]

## 2023-05-26 NOTE — SUBJECTIVE & OBJECTIVE
Past Medical History:   Diagnosis Date    Altered mental state 12/13/2022    Constipation     Gastroesophageal reflux disease with esophagitis without hemorrhage 9/26/2021    GERD (gastroesophageal reflux disease)     Hematemesis with nausea 9/26/2021    Hyperlipidemia     Memory loss     Schizophrenia     Seizures     Ulcer of esophagus without bleeding 9/26/2021       Past Surgical History:   Procedure Laterality Date    EXTRACTION OF TOOTH N/A 4/25/2022    Procedure: EXTRACTION, TOOTH;  Surgeon: Rob Farris DMD, MD;  Location: [facility];  Service: Oral Surgery;  Laterality: N/A;       Review of patient's allergies indicates:   Allergen Reactions    Ativan [lorazepam]     Prolixin [fluphenazine hcl]        No current facility-administered medications on file prior to encounter.     Current Outpatient Medications on File Prior to Encounter   Medication Sig    acetaminophen (TYLENOL) 500 MG tablet Take 500 mg by mouth every 6 (six) hours as needed for Pain.    bisacodyL (DULCOLAX) 10 mg Supp Place 10 mg rectally Every 3 (three) days.    cyproheptadine (PERIACTIN) 4 mg tablet Take 4 mg by mouth 3 (three) times daily.    docusate sodium (COLACE) 100 MG capsule Take 100 mg by mouth 2 (two) times daily.    folic acid (FOLVITE) 1 MG tablet Take 1 mg by mouth once daily.    INVEGA SUSTENNA 117 mg/0.75 mL Syrg injection Inject 117 mg into the muscle every 30 days.    multivitamin with minerals tablet Take 1 tablet by mouth once daily.    ondansetron (ZOFRAN) 4 MG tablet Take 4 mg by mouth every 8 (eight) hours as needed for Nausea.     Family History       Family history is unknown by patient.          Tobacco Use    Smoking status: Never    Smokeless tobacco: Never   Substance and Sexual Activity    Alcohol use: Not Currently    Drug use: Never    Sexual activity: Not on file     Review of Systems   Constitutional:  Positive for diaphoresis.   Objective:     Vital Signs (Most Recent):  Temp: 97.6 °F (36.4 °C)  (05/22/23 1100)  Pulse: 74 (05/22/23 1100)  Resp: 18 (05/22/23 1100)  BP: (!) 98/51 (05/22/23 1100)  SpO2: 95 % (05/22/23 1100) Vital Signs (24h Range):        Weight: 54.4 kg (120 lb)  Body mass index is 18.79 kg/m².     Physical Exam  Neurological:      Mental Status: She is alert.              Significant Labs: All pertinent labs within the past 24 hours have been reviewed.    Significant Imaging: I have reviewed all pertinent imaging results/findings within the past 24 hours.

## 2023-08-21 PROBLEM — J18.9 COMMUNITY ACQUIRED PNEUMONIA OF RIGHT LOWER LOBE OF LUNG: Status: RESOLVED | Noted: 2023-05-20 | Resolved: 2023-08-21

## 2023-08-21 PROBLEM — K92.2 UGIB (UPPER GASTROINTESTINAL BLEED): Status: RESOLVED | Noted: 2021-09-26 | Resolved: 2023-08-21

## 2023-09-27 ENCOUNTER — HOSPITAL ENCOUNTER (EMERGENCY)
Facility: HOSPITAL | Age: 68
Discharge: HOME | End: 2023-09-28
Attending: EMERGENCY MEDICINE
Payer: MEDICARE

## 2023-09-27 VITALS
BODY MASS INDEX: 16.64 KG/M2 | RESPIRATION RATE: 18 BRPM | DIASTOLIC BLOOD PRESSURE: 72 MMHG | HEART RATE: 68 BPM | WEIGHT: 106 LBS | TEMPERATURE: 99 F | SYSTOLIC BLOOD PRESSURE: 127 MMHG | OXYGEN SATURATION: 95 % | HEIGHT: 67 IN

## 2023-09-27 DIAGNOSIS — F20.9 SCHIZOPHRENIA, UNSPECIFIED TYPE: Primary | ICD-10-CM

## 2023-09-27 LAB
ALBUMIN SERPL BCP-MCNC: 3.2 G/DL (ref 3.5–5)
ALBUMIN/GLOB SERPL: 1.1 {RATIO}
ALP SERPL-CCNC: 84 U/L (ref 55–142)
ALT SERPL W P-5'-P-CCNC: 21 U/L (ref 13–56)
ANION GAP SERPL CALCULATED.3IONS-SCNC: 8 MMOL/L (ref 7–16)
AST SERPL W P-5'-P-CCNC: 17 U/L (ref 15–37)
BASOPHILS # BLD AUTO: 0.01 K/UL (ref 0–0.2)
BASOPHILS NFR BLD AUTO: 0.2 % (ref 0–1)
BILIRUB SERPL-MCNC: 0.4 MG/DL (ref ?–1.2)
BUN SERPL-MCNC: 17 MG/DL (ref 7–18)
BUN/CREAT SERPL: 22 (ref 6–20)
CALCIUM SERPL-MCNC: 8.5 MG/DL (ref 8.5–10.1)
CHLORIDE SERPL-SCNC: 106 MMOL/L (ref 98–107)
CO2 SERPL-SCNC: 30 MMOL/L (ref 21–32)
CREAT SERPL-MCNC: 0.78 MG/DL (ref 0.55–1.02)
DIFFERENTIAL METHOD BLD: ABNORMAL
EGFR (NO RACE VARIABLE) (RUSH/TITUS): 82 ML/MIN/1.73M2
EOSINOPHIL # BLD AUTO: 0.08 K/UL (ref 0–0.5)
EOSINOPHIL NFR BLD AUTO: 2 % (ref 1–4)
ERYTHROCYTE [DISTWIDTH] IN BLOOD BY AUTOMATED COUNT: 12.7 % (ref 11.5–14.5)
GLOBULIN SER-MCNC: 2.8 G/DL (ref 2–4)
GLUCOSE SERPL-MCNC: 102 MG/DL (ref 74–106)
HCT VFR BLD AUTO: 34.6 % (ref 38–47)
HGB BLD-MCNC: 11.6 G/DL (ref 12–16)
IMM GRANULOCYTES # BLD AUTO: 0.01 K/UL (ref 0–0.04)
IMM GRANULOCYTES NFR BLD: 0.2 % (ref 0–0.4)
LYMPHOCYTES # BLD AUTO: 1.49 K/UL (ref 1–4.8)
LYMPHOCYTES NFR BLD AUTO: 37.2 % (ref 27–41)
MCH RBC QN AUTO: 32.4 PG (ref 27–31)
MCHC RBC AUTO-ENTMCNC: 33.5 G/DL (ref 32–36)
MCV RBC AUTO: 96.6 FL (ref 80–96)
MONOCYTES # BLD AUTO: 0.31 K/UL (ref 0–0.8)
MONOCYTES NFR BLD AUTO: 7.7 % (ref 2–6)
MPC BLD CALC-MCNC: 10.6 FL (ref 9.4–12.4)
NEUTROPHILS # BLD AUTO: 2.11 K/UL (ref 1.8–7.7)
NEUTROPHILS NFR BLD AUTO: 52.7 % (ref 53–65)
NRBC # BLD AUTO: 0 X10E3/UL
NRBC, AUTO (.00): 0 %
PLATELET # BLD AUTO: 170 K/UL (ref 150–400)
POTASSIUM SERPL-SCNC: 3.8 MMOL/L (ref 3.5–5.1)
PROT SERPL-MCNC: 6 G/DL (ref 6.4–8.2)
RBC # BLD AUTO: 3.58 M/UL (ref 4.2–5.4)
SODIUM SERPL-SCNC: 140 MMOL/L (ref 136–145)
WBC # BLD AUTO: 4.01 K/UL (ref 4.5–11)

## 2023-09-27 PROCEDURE — 85025 COMPLETE CBC W/AUTO DIFF WBC: CPT | Performed by: EMERGENCY MEDICINE

## 2023-09-27 PROCEDURE — 99284 EMERGENCY DEPT VISIT MOD MDM: CPT | Mod: ,,, | Performed by: EMERGENCY MEDICINE

## 2023-09-27 PROCEDURE — 99284 PR EMERGENCY DEPT VISIT,LEVEL IV: ICD-10-PCS | Mod: ,,, | Performed by: EMERGENCY MEDICINE

## 2023-09-27 PROCEDURE — 80053 COMPREHEN METABOLIC PANEL: CPT | Performed by: EMERGENCY MEDICINE

## 2023-09-27 PROCEDURE — 99284 EMERGENCY DEPT VISIT MOD MDM: CPT | Mod: 25

## 2023-09-28 NOTE — ED TRIAGE NOTES
"Pt presents to ED from [facility] via [facility] after pt was found "unresponsive". [facility] stated when they got there, they applied pressure to bottom of foot and pt sat straight up in the bed.   "

## 2023-09-28 NOTE — ED PROVIDER NOTES
Encounter Date: 9/27/2023    SCRIBE #1 NOTE: I, Praveena Mesa, am scribing for, and in the presence of,  Dann Barbosa MD. I have scribed the entire note.       History     Chief Complaint   Patient presents with    Altered Mental Status     Pt sent from nursing home for ams - ems states they wer told that pt had therapy today and not sure if she is just that tired - ems states she woke up for them on arrival to nursing home     This is a 66 y/o female, who presents to the ED for evaluation. She was transported from [facility]. Nursing staff states they noticed the pt was unresponsive tonight. EMS states when they arrived on scene, they applied to the bottom of the pt's foot and she became more aroused. Nursing staff reports they sent the pt to rule out a seizure as she has a known hx of seizures. . She appears to be at her normal baseline. There are no other complaints/pain in the ED at this time. There is a known hx of GERD, HTN, seizures and Schizophrenia. There is no surgical hx on file. There is no smoking or alcohol use on file.     The history is provided by the EMS personnel and the nursing home. No  was used.     Review of patient's allergies indicates:   Allergen Reactions    Ativan [lorazepam]     Prolixin [fluphenazine hcl]      Past Medical History:   Diagnosis Date    Altered mental state 12/13/2022    Constipation     Gastroesophageal reflux disease with esophagitis without hemorrhage 9/26/2021    GERD (gastroesophageal reflux disease)     Hematemesis with nausea 9/26/2021    Hyperlipidemia     Memory loss     Schizophrenia     Seizures     Ulcer of esophagus without bleeding 9/26/2021     Past Surgical History:   Procedure Laterality Date    EXTRACTION OF TOOTH N/A 4/25/2022    Procedure: EXTRACTION, TOOTH;  Surgeon: Rob Farris DMD, MD;  Location: [facility];  Service: Oral Surgery;  Laterality: N/A;     Family History   Family history unknown: Yes      Social History     Tobacco Use    Smoking status: Never    Smokeless tobacco: Never   Substance Use Topics    Alcohol use: Not Currently    Drug use: Never     Review of Systems   Unable to perform ROS: Mental status change   All other systems reviewed and are negative.      Physical Exam     Initial Vitals [09/27/23 2122]   BP Pulse Resp Temp SpO2   128/60 70 18 99.1 °F (37.3 °C) 95 %      MAP       --         Physical Exam    Nursing note and vitals reviewed.  Constitutional: She appears well-developed and well-nourished.   HENT:   Head: Normocephalic and atraumatic.   Eyes: EOM are normal. Pupils are equal, round, and reactive to light.   Neck: Neck supple. No thyromegaly present.   Normal range of motion.  Cardiovascular:  Normal rate, regular rhythm, normal heart sounds and intact distal pulses.           No murmur heard.  Pulmonary/Chest: Breath sounds normal. No respiratory distress. She has no wheezes.   Abdominal: Abdomen is soft. Bowel sounds are normal. She exhibits no distension. There is no abdominal tenderness.   Musculoskeletal:         General: No tenderness or edema. Normal range of motion.      Cervical back: Normal range of motion and neck supple.     Lymphadenopathy:     She has no cervical adenopathy.   Neurological: She is alert and oriented to person, place, and time. She has normal strength. No cranial nerve deficit or sensory deficit.   Skin: Skin is warm and dry. No rash noted.   Psychiatric: She has a normal mood and affect.         ED Course   Procedures  Labs Reviewed   COMPREHENSIVE METABOLIC PANEL - Abnormal; Notable for the following components:       Result Value    BUN/Creatinine Ratio 22 (*)     Total Protein 6.0 (*)     Albumin 3.2 (*)     All other components within normal limits   CBC WITH DIFFERENTIAL - Abnormal; Notable for the following components:    WBC 4.01 (*)     RBC 3.58 (*)     Hemoglobin 11.6 (*)     Hematocrit 34.6 (*)     MCV 96.6 (*)     MCH 32.4 (*)      Neutrophils % 52.7 (*)     Monocytes % 7.7 (*)     All other components within normal limits   CBC W/ AUTO DIFFERENTIAL    Narrative:     The following orders were created for panel order CBC auto differential.  Procedure                               Abnormality         Status                     ---------                               -----------         ------                     CBC with Differential[[id number]]        Abnormal            Final result                 Please view results for these tests on the individual orders.          Imaging Results              CT Head Without Contrast (In process)                      Medications - No data to display  Medical Decision Making  MDM    Patient presents for emergent evaluation of acute full to tell arouse but does have history of schizophrenia multiple psychotropic medications and said by staff to have a history of being difficult to wake up when she was asleep that poses a threat to life and/or bodily function.  No observe seizures  In the ED patient found to have acute difficulty to arouse likely sleep inertia with history of schizophrenia.    I ordered labs and personally reviewed them.  Labs significant for white count normal.  Hemoglobin 11.6.  Electrolytes unremarkable  I ordered CT scan and personally reviewed it and reviewed the radiologist interpretation.  CT significant for no acute abnormality.      Discharge [facility]  Patient was discharged in stable condition.  Detailed return precautions discussed.     Amount and/or Complexity of Data Reviewed  Labs: ordered.  Radiology: ordered.              Attending Attestation:           Physician Attestation for Scribe:  Physician Attestation Statement for Scribe #1: I, Dann Barbosa MD, reviewed documentation, as scribed by Praveena Mesa in my presence, and it is both accurate and complete.             ED Course as of 09/27/23 2322   Wed Sep 27, 2023   2224 Head CT:   Unremarkable.  [BW]      ED Course  User Index  [BW] Praveena Mesa                    Clinical Impression:   Final diagnoses:  [F20.9] Schizophrenia, unspecified type (Primary)        ED Disposition Condition    Discharge Stable          ED Prescriptions    None       Follow-up Information       Follow up With Specialties Details Why Contact Info    [facility] - Emergency Department Emergency Medicine Go to  As needed, If symptoms worsen [address] [place] [id number] [phone]             Dann Barbosa MD  09/27/23 9290

## 2023-11-25 NOTE — NURSING
"0703: Rec'd pt laying in bed. Pt stable. Resp even, unlabored. No complaints/concerns. Safety precautions in place. CB within reach. Will cont plan of care.     1032: Tried to persuade pt to take morning medications per MD request but pt still adamantly refusing. Pt stated, "I will not take anything. I do not want water. I want to die in peace." Left pt sitting up in bed. Offered to turn on television but pt declined. Safety precautions in place. CB within reach. Will cont plan of care.     1118: Face to face completed at this time. Pt stable Resp even, unlabored. No complaints/concerns. Safety precautions in place. CB within reach. Will cont plan of care.      1306: Face to face completed at this time. Pt stable Resp even, unlabored. No complaints/concerns. Safety precautions in place. CB within reach. Will cont plan of care.     1449: Face to face completed at this time. Pt stable Resp even, unlabored. No complaints/concerns. Safety precautions in place. CB within reach. Will cont plan of care.      1643: Report called to NH. PT stable at this time. [facility] requested. Monitors removed and returned. IV removed, pt tolerated well. Safety precautions in place. CB within reach. Will cont plan of care until leave.      1742: [facility] picked up pt at this time. Pt stable upon leave.  "
0715- Received report on pt from ALEX Valencia. Pt in bed with eyes open. CL in easy reach. Resp even and unlabored. No c/o voiced at present. Bed in lowest position with SR up x 2. POC reviewed and ongoing.     
Dr. Rose on floor to see patient. No new orders received at this time.    
Uses walker

## 2024-02-28 ENCOUNTER — HOSPITAL ENCOUNTER (EMERGENCY)
Facility: HOSPITAL | Age: 69
Discharge: PSYCHIATRIC OR SUBSTANCE USE FACILITY | End: 2024-02-28
Payer: MEDICARE

## 2024-02-28 VITALS
DIASTOLIC BLOOD PRESSURE: 74 MMHG | BODY MASS INDEX: 18.78 KG/M2 | HEART RATE: 70 BPM | RESPIRATION RATE: 16 BRPM | WEIGHT: 110 LBS | OXYGEN SATURATION: 98 % | SYSTOLIC BLOOD PRESSURE: 130 MMHG | HEIGHT: 64 IN | TEMPERATURE: 98 F

## 2024-02-28 DIAGNOSIS — S06.0X0A CONCUSSION WITHOUT LOSS OF CONSCIOUSNESS, INITIAL ENCOUNTER: Primary | ICD-10-CM

## 2024-02-28 DIAGNOSIS — T14.8XXA HEMATOMA AND CONTUSION: ICD-10-CM

## 2024-02-28 PROCEDURE — 99283 EMERGENCY DEPT VISIT LOW MDM: CPT | Mod: ,,, | Performed by: NURSE PRACTITIONER

## 2024-02-28 PROCEDURE — 99284 EMERGENCY DEPT VISIT MOD MDM: CPT | Mod: 25

## 2024-02-28 NOTE — ED NOTES
[facility] transportation formed filled out at this time.     Confirmation #: [id number]  Submission Date / Time: Feb 28, 2024 2:56 PM

## 2024-02-28 NOTE — ED PROVIDER NOTES
Encounter Date: 2/28/2024       History     Chief Complaint   Patient presents with    Head Injury    Fall     68 year old female presents to ED status post head injury an fall. Patient is resident at nursing home and staff reports patient was walking briskly through the hallway and ran into the door frame hitting the right side of her head. HPI limited from patient due to history of schizophrenia, memory loss, and episodes of being nonverbal due to psychiatric conditions.     The history is provided by the nursing home and the EMS personnel.     Review of patient's allergies indicates:   Allergen Reactions    Ativan [lorazepam]     Prolixin [fluphenazine hcl]      Past Medical History:   Diagnosis Date    Altered mental state 12/13/2022    Constipation     Gastroesophageal reflux disease with esophagitis without hemorrhage 9/26/2021    GERD (gastroesophageal reflux disease)     Hematemesis with nausea 9/26/2021    Hyperlipidemia     Memory loss     Schizophrenia     Seizures     Ulcer of esophagus without bleeding 9/26/2021     Past Surgical History:   Procedure Laterality Date    EXTRACTION OF TOOTH N/A 4/25/2022    Procedure: EXTRACTION, TOOTH;  Surgeon: Rbo Farris DMD, MD;  Location: [facility];  Service: Oral Surgery;  Laterality: N/A;     Family History   Family history unknown: Yes     Social History     Tobacco Use    Smoking status: Never    Smokeless tobacco: Never   Substance Use Topics    Alcohol use: Not Currently    Drug use: Never     Review of Systems   Unable to perform ROS: Patient nonverbal       Physical Exam     Initial Vitals [02/28/24 1309]   BP Pulse Resp Temp SpO2   130/74 70 16 98.4 °F (36.9 °C) 98 %      MAP       --         Physical Exam    Nursing note and vitals reviewed.  Constitutional: She appears well-developed and well-nourished.   HENT:   Head: Normocephalic.       Eyes: EOM are normal. Pupils are equal, round, and reactive to light.   Neck: Neck supple.   Normal range of  motion.  Cardiovascular:  Normal rate and regular rhythm.           No murmur heard.  Pulmonary/Chest: She has no wheezes. She has no rhonchi.   Abdominal: Abdomen is soft. She exhibits no distension. There is no abdominal tenderness.   Musculoskeletal:         General: No tenderness or edema.      Cervical back: Normal range of motion and neck supple.     Lymphadenopathy:     She has no cervical adenopathy.   Neurological: She is alert and oriented to person, place, and time. No cranial nerve deficit or sensory deficit.   Skin: Skin is warm and dry. Capillary refill takes less than 2 seconds.   Psychiatric: She has a normal mood and affect. Thought content normal.         Medical Screening Exam   See Full Note    ED Course   Procedures  Labs Reviewed - No data to display       Imaging Results              CT Head Without Contrast (Final result)  Result time 02/28/24 14:00:57      Final result by Kelvin Samuel DO (02/28/24 14:00:57)                   Impression:      No acute intracranial findings.      Electronically signed by: Kelvin Samuel  Date:    02/28/2024  Time:    14:00               Narrative:    EXAMINATION:  CT HEAD WITHOUT CONTRAST    CLINICAL HISTORY:  Head trauma, moderate-severe;    TECHNIQUE:  Multiplanar CT imaging from the vertex to skull the skull base was performed without contrast.    Dose reduction:    The CT exam was performed using one or more dose reduction techniques: Automatic exposure control, automated adjustment of the MA and/or kVP according to patient size, or use of iterative reconstruction technique.    COMPARISON:  9/27/23    FINDINGS:  Global brain parenchymal volume loss.  Chronic small vessel ischemic change.    There is no CT evidence of acute intracranial hemorrhage or large territorial infarct. No epidural/subdural hematomas.    There is no evidence of hydrocephalus, midline shift or mass effect.    Small hematoma overlying the right frontal bone.    Opacification  of the right sphenoid sinus.                                       Medications - No data to display  Medical Decision Making  68 year old female presents to ED status post head injury an fall. Patient is resident at nursing home and staff reports patient was walking briskly through the hallway and ran into the door frame hitting the right side of her head. HPI limited from patient due to history of schizophrenia, memory loss, and episodes of being nonverbal due to psychiatric conditions.     Diagnostic obtained    Amount and/or Complexity of Data Reviewed  Radiology: ordered.     Details: No acute intracranial findings.                                        Clinical Impression:   Final diagnoses:  [S06.0X0A] Concussion without loss of consciousness, initial encounter (Primary)  [T14.8XXA] Hematoma and contusion        ED Disposition Condition    Discharge Stable          ED Prescriptions    None       Follow-up Information    None          Jolene Bullock, [facility]  02/28/24 9257

## 2024-02-28 NOTE — ED TRIAGE NOTES
Patient presents to ED via EMS from [facility] after a fall.  Patient walks very fast and was walking out of doorway when she hit the right side of her forehead on the corner of the door.  Patient fell to the floor.  Nurses report the patient did not lose consciousness.  Patient does not take any blood thinners.  Patient has abrasion to right forehead upon arrival.

## 2024-05-03 ENCOUNTER — HOSPITAL ENCOUNTER (EMERGENCY)
Facility: HOSPITAL | Age: 69
Discharge: NURSING, REHAB OR LONG-TERM CARE | End: 2024-05-03
Attending: EMERGENCY MEDICINE
Payer: MEDICARE

## 2024-05-03 VITALS
TEMPERATURE: 98 F | SYSTOLIC BLOOD PRESSURE: 133 MMHG | RESPIRATION RATE: 18 BRPM | HEART RATE: 83 BPM | DIASTOLIC BLOOD PRESSURE: 76 MMHG | BODY MASS INDEX: 18.78 KG/M2 | OXYGEN SATURATION: 97 % | WEIGHT: 110 LBS | HEIGHT: 64 IN

## 2024-05-03 DIAGNOSIS — S09.90XA INJURY OF HEAD, INITIAL ENCOUNTER: ICD-10-CM

## 2024-05-03 DIAGNOSIS — S01.81XA FACIAL LACERATION, INITIAL ENCOUNTER: Primary | ICD-10-CM

## 2024-05-03 PROCEDURE — 99284 EMERGENCY DEPT VISIT MOD MDM: CPT | Mod: 25

## 2024-05-03 PROCEDURE — 99284 EMERGENCY DEPT VISIT MOD MDM: CPT | Mod: ,,, | Performed by: EMERGENCY MEDICINE

## 2024-05-03 PROCEDURE — 25000003 PHARM REV CODE 250: Performed by: EMERGENCY MEDICINE

## 2024-05-03 RX ORDER — LIDOCAINE HYDROCHLORIDE 10 MG/ML
10 INJECTION INFILTRATION; PERINEURAL
Status: COMPLETED | OUTPATIENT
Start: 2024-05-03 | End: 2024-05-03

## 2024-05-03 RX ADMIN — BACITRACIN ZINC AND POLYMYXIN B SULFATE: at 04:05

## 2024-05-03 RX ADMIN — LIDOCAINE HYDROCHLORIDE 10 ML: 10 INJECTION, SOLUTION INFILTRATION; PERINEURAL at 03:05

## 2024-05-03 NOTE — ED PROVIDER NOTES
Encounter Date: 5/3/2024       History     Chief Complaint   Patient presents with    Fall     Patient is a 68-year-old female with history of dementia who was sent here from nursing home after fall sustaining laceration to her right brow area.  No further history was provided from nursing home.  Patient can not provide any further history herself.      Review of patient's allergies indicates:   Allergen Reactions    Ativan [lorazepam]     Prolixin [fluphenazine hcl]      Past Medical History:   Diagnosis Date    Comfort measures only status 12/19/2022    Constipation     Dementia, presenile with depression, with behavioral disturbance     Esophageal ulcer with bleeding     Gastroesophageal reflux disease with esophagitis without hemorrhage 09/26/2021    Hyperlipidemia     Schizophrenia     Seizure disorder 08/25/2021     Past Surgical History:   Procedure Laterality Date    EXTRACTION OF TOOTH N/A 4/25/2022    Procedure: EXTRACTION, TOOTH;  Surgeon: Rob Farris DMD, MD;  Location: [facility];  Service: Oral Surgery;  Laterality: N/A;     Family History   Family history unknown: Yes     Social History     Tobacco Use    Smoking status: Never    Smokeless tobacco: Never   Substance Use Topics    Alcohol use: Not Currently    Drug use: Never     Review of Systems   Unable to perform ROS: Dementia   HENT:          Facial laceration       Physical Exam     Initial Vitals   BP Pulse Resp Temp SpO2   05/03/24 0328 05/03/24 0328 05/03/24 0333 05/03/24 0333 05/03/24 0329   133/76 83 18 97.9 °F (36.6 °C) 97 %      MAP       --                Physical Exam    Nursing note and vitals reviewed.  Constitutional: She appears well-developed and well-nourished.   HENT:   Head: Normocephalic.   There is a hematoma and large laceration involving right eyebrow I am lateral to right eyebrow.   Eyes: Pupils are equal, round, and reactive to light.   Cardiovascular:  Normal rate.           Pulmonary/Chest: Breath sounds normal.    Abdominal: Abdomen is soft.   Musculoskeletal:         General: Normal range of motion.     Neurological: She is alert.   Skin: Skin is warm. Capillary refill takes less than 2 seconds.   Psychiatric: She has a normal mood and affect.         Medical Screening Exam   See Full Note    ED Course   Procedures  Labs Reviewed - No data to display       Imaging Results              CT Head Without Contrast (Final result)  Result time 05/03/24 07:41:19      Final result by Francisco Goldsmith II, MD (05/03/24 07:41:19)                   Impression:      No evidence of abnormality demonstrated.      Electronically signed by: Francisco Goldsmith  Date:    05/03/2024  Time:    07:41               Narrative:    EXAMINATION:  CT HEAD WITHOUT CONTRAST    CLINICAL HISTORY:  Facial trauma, blunt;Head trauma, minor (Age >= 65y);    TECHNIQUE:  Axial CT imaging of the brain is performed without contrast with 3 mm increments.    CT dose reduction technique used - Dose Rite and tube current modulation.    COMPARISON:  None available    FINDINGS:  No evidence of hemorrhage, mass, mass effect, midline shift or acute infarct seen.  The brain parenchyma attenuation and differentiation appears within normal limits for age. The ventricles and cisterns are normal in caliber.  No cranial or skull base abnormality is identified.                                       CT Cervical Spine Without Contrast (Final result)  Result time 05/03/24 07:50:59      Final result by Francisco Goldsmith II, MD (05/03/24 07:50:59)                   Impression:      No evidence of acute injury demonstrated      Electronically signed by: Francisco Goldsmith  Date:    05/03/2024  Time:    07:50               Narrative:    EXAMINATION:  CT CERVICAL SPINE WITHOUT CONTRAST    CLINICAL HISTORY:  Neck trauma (Age >= 65y);    TECHNIQUE:  Axial CT imaging of the cervical spine is performed without contrast.  Computer reformatting is viewed in the sagittal and coronal  planes.    CT dose reduction technique used - Dose Rite and tube current modulation.    COMPARISON:  None available    FINDINGS:  No fracture is seen.  Alignment of the cervical spine is within normal limits.  Vertebral body heights are normal.  Multilevel degenerative changes present most pronounced at C4-5 and C5-6.  No other abnormality is demonstrated.                                       Medications   LIDOcaine HCL 10 mg/ml (1%) injection 10 mL (10 mLs Infiltration Given by Provider 5/3/24 0345)   bacitracin zinc-polymyxin B ointment ( Topical (Top) Given 5/3/24 0400)     Medical Decision Making  Amount and/or Complexity of Data Reviewed  Radiology: ordered.    Risk  OTC drugs.  Prescription drug management.               ED Course as of 05/03/24 1808   Fri May 03, 2024   0335 Medical decision-making:  Differential diagnosis includes fall, facial laceration, head injury, C-spine injury.  Imaging ordered by me and interpreted by Radiology. [BB]   0353 Procedure note:  Laceration repair:  Laceration involving right lateral brow area which was 6 cm long was anesthetized locally with lidocaine, irrigated with saline, prepped with Betadine, then closed using a total of 6 simple interrupted 4-0 Ethicon sutures. [BB]   0448 CT brain shows no acute findings. [BB]   0448 CT cervical spine is negative. [BB]      ED Course User Index  [BB] Daniel Payton MD                           Clinical Impression:   Final diagnoses:  [S01.81XA] Facial laceration, initial encounter (Primary)  [S09.90XA] Injury of head, initial encounter        ED Disposition Condition    Discharge Stable          ED Prescriptions    None       Follow-up Information    None          Daniel Payton MD  05/03/24 1808

## 2024-05-03 NOTE — DISCHARGE INSTRUCTIONS
Have sutures removed in 10 days.  Return to emergency department for any worsening or further problems.  Avoid falling.

## 2024-05-03 NOTE — ED TRIAGE NOTES
Patient arrives via EMS from [facility]; nursing staff report that patient had an unwitnessed fall that resulted in a laceration to right eyebrow.

## 2024-08-06 ENCOUNTER — HOSPITAL ENCOUNTER (EMERGENCY)
Facility: HOSPITAL | Age: 69
Discharge: HOME | End: 2024-08-06
Attending: EMERGENCY MEDICINE
Payer: MEDICARE

## 2024-08-06 VITALS
WEIGHT: 115 LBS | BODY MASS INDEX: 19.63 KG/M2 | TEMPERATURE: 98 F | OXYGEN SATURATION: 97 % | HEART RATE: 76 BPM | RESPIRATION RATE: 11 BRPM | DIASTOLIC BLOOD PRESSURE: 67 MMHG | SYSTOLIC BLOOD PRESSURE: 137 MMHG | HEIGHT: 64 IN

## 2024-08-06 DIAGNOSIS — S42.332A CLOSED DISPLACED OBLIQUE FRACTURE OF SHAFT OF LEFT HUMERUS, INITIAL ENCOUNTER: Primary | ICD-10-CM

## 2024-08-06 DIAGNOSIS — M79.602 LEFT ARM PAIN: ICD-10-CM

## 2024-08-06 PROCEDURE — 99284 EMERGENCY DEPT VISIT MOD MDM: CPT | Mod: 25

## 2024-08-06 NOTE — ED NOTES
Transport information sent to [facility] for transfer to [facility] - confirmation # [id number].

## 2024-08-06 NOTE — DISCHARGE INSTRUCTIONS
Wear shoulder immobilizer at all times.  Patient will need follow up with Orthopedic surgery at [facility].  Return to emergency department for any worsening or further problems.

## 2024-08-06 NOTE — ED PROVIDER NOTES
Encounter Date: 8/6/2024       History     Chief Complaint   Patient presents with    Fall    Arm Injury     Patient presents to the ED from [facility] with c/o fall and left arm injury.     Patient is a 68-year-old DNR nursing home patient with dementia who was sent here after a fall and suspected left humerus fracture.  Patient can not provide any history herself and no further history was provided from nursing home.  It is unclear how she fell.  Patient does seem drowsy here.  She did receive morphine by EMS prior to arrival.        Review of patient's allergies indicates:   Allergen Reactions    Ativan [lorazepam]     Prolixin [fluphenazine hcl]      Past Medical History:   Diagnosis Date    Comfort measures only status 12/19/2022    Constipation     Dementia, presenile with depression, with behavioral disturbance     Esophageal ulcer with bleeding     Gastroesophageal reflux disease with esophagitis without hemorrhage 09/26/2021    Hyperlipidemia     Schizophrenia     Seizure disorder 08/25/2021     Past Surgical History:   Procedure Laterality Date    EXTRACTION OF TOOTH N/A 4/25/2022    Procedure: EXTRACTION, TOOTH;  Surgeon: Rob Farris DMD, MD;  Location: [facility];  Service: Oral Surgery;  Laterality: N/A;     Family History   Family history unknown: Yes     Social History     Tobacco Use    Smoking status: Never    Smokeless tobacco: Never   Substance Use Topics    Alcohol use: Not Currently    Drug use: Never     Review of Systems   Unable to perform ROS: Dementia       Physical Exam     Initial Vitals [08/06/24 0728]   BP Pulse Resp Temp SpO2   (!) 140/76 61 14 97.6 °F (36.4 °C) 97 %      MAP       --         Physical Exam    Nursing note and vitals reviewed.  Constitutional: She appears well-developed and well-nourished.   HENT:   Head: Normocephalic.   Eyes: Pupils are equal, round, and reactive to light.   Cardiovascular:  Normal rate.           Pulmonary/Chest: Breath sounds normal.    Abdominal: Abdomen is soft.   Musculoskeletal:      Comments: There appears to be some deformity around proximal humerus.     Neurological:   Patient is lethargic and poorly responsive.   Skin: Skin is warm. Capillary refill takes less than 2 seconds.   Psychiatric: She has a normal mood and affect.         Medical Screening Exam   See Full Note    ED Course   Procedures  Labs Reviewed - No data to display       Imaging Results              CT Cervical Spine Without Contrast (Final result)  Result time 08/06/24 08:07:58      Final result by Francesco Terrazas MD (08/06/24 08:07:58)                   Impression:      No acute fracture or dislocation of the cervical spine.      Electronically signed by: Francesco Terrazas  Date:    08/06/2024  Time:    08:07               Narrative:    EXAMINATION:  CT CERVICAL SPINE WITHOUT CONTRAST    CLINICAL HISTORY:  Neck trauma (Age >= 65y);Neck trauma, intoxicated or obtunded (Age >= 16y);    TECHNIQUE:  CT of the cervical spine performed without intravenous contrast. The CT examination was performed using one or more of the following dose reduction techniques: Automated exposure control, adjustment of the mA and kV according to patient's size, use of acute or iterative reconstruction techniques.    COMPARISON:  05/03/2024    FINDINGS:  No fracture detected.  No dislocation.  The craniocervical junction is intact.  Degenerative endplate and facet changes with spinal canal and foraminal stenoses are similar to what was seen previously.  Left-sided thyroid nodules as seen previously.                                       CT Head Without Contrast (Final result)  Result time 08/06/24 08:08:50      Final result by Francisco Goldsmith II, MD (08/06/24 08:08:50)                   Impression:      No evidence of abnormality demonstrated.      Electronically signed by: Francisco Goldsmith  Date:    08/06/2024  Time:    08:08               Narrative:    EXAMINATION:  CT HEAD WITHOUT CONTRAST    CLINICAL  HISTORY:  Head trauma, intracranial venous injury suspected;    TECHNIQUE:  Axial CT imaging of the brain is performed without contrast with 3 mm increments.    CT dose reduction technique used - Dose Rite and tube current modulation.    COMPARISON:  3 May 2024    FINDINGS:  No evidence of hemorrhage, mass, mass effect, midline shift or acute infarct seen.  The brain parenchyma attenuation and differentiation appears within normal limits for age. The ventricles and cisterns are normal in caliber.  No cranial or skull base abnormality is identified.                                       X-Ray Humerus 2 View Left (Final result)  Result time 08/06/24 08:05:40      Final result by Francisco Goldsmith II, MD (08/06/24 08:05:40)                   Impression:      Distal humerus fracture as described above.      Electronically signed by: Francisco Goldsmith  Date:    08/06/2024  Time:    08:05               Narrative:    EXAMINATION:  XR HUMERUS 2 VIEW LEFT    CLINICAL HISTORY:  Pain in left arm    COMPARISON:  None available    TECHNIQUE:  XR HUMERUS 2 VIEW LEFT    FINDINGS:  There is fracture of the midshaft of the heart humerus with posterolateral displacement of the distal segment.  The alignment of the joints appears normal.  No degenerative change is present.  No soft tissue abnormality is seen.                                       Medications - No data to display  Medical Decision Making             ED Course as of 08/06/24 0825   Tue Aug 06, 2024   0732 Medical decision-making:  Differential diagnosis includes fall, left arm contusion, left arm sprain, left humerus fracture, head injury, C-spine injury.  All testing ordered and interpreted by me. [BB]   0816 X-ray of left humerus by my interpretation shows mid shaft fracture just below stem of prosthesis. [BB]   0817 CT brain and cervical spine showed no acute findings. [BB]   0819 I discussed fracture with orthopedist on-call, Dr. Stein.  He states patient needs  to be placed in his [facility] lower immobilizer and needs follow up with Orthopedics at [place]. [BB]      ED Course User Index  [BB] Daniel Payton MD                           Clinical Impression:   Final diagnoses:  [M79.602] Left arm pain  [S42.332A] Closed displaced oblique fracture of shaft of left humerus, initial encounter (Primary)        ED Disposition Condition    Discharge Stable          ED Prescriptions    None       Follow-up Information    None          Daniel Payton MD  08/06/24 3095

## 2024-08-25 ENCOUNTER — HOSPITAL ENCOUNTER (EMERGENCY)
Facility: HOSPITAL | Age: 69
Discharge: NURSING, REHAB OR LONG-TERM CARE | End: 2024-08-25
Attending: EMERGENCY MEDICINE
Payer: MEDICARE

## 2024-08-25 VITALS
WEIGHT: 110 LBS | DIASTOLIC BLOOD PRESSURE: 62 MMHG | TEMPERATURE: 99 F | BODY MASS INDEX: 18.78 KG/M2 | HEART RATE: 100 BPM | RESPIRATION RATE: 20 BRPM | OXYGEN SATURATION: 93 % | HEIGHT: 64 IN | SYSTOLIC BLOOD PRESSURE: 100 MMHG

## 2024-08-25 DIAGNOSIS — R00.0 TACHYCARDIA: ICD-10-CM

## 2024-08-25 DIAGNOSIS — N39.0 URINARY TRACT INFECTION WITHOUT HEMATURIA, SITE UNSPECIFIED: ICD-10-CM

## 2024-08-25 DIAGNOSIS — R11.2 NAUSEA AND VOMITING, UNSPECIFIED VOMITING TYPE: Primary | ICD-10-CM

## 2024-08-25 LAB
ALBUMIN SERPL BCP-MCNC: 2.8 G/DL (ref 3.5–5)
ALBUMIN/GLOB SERPL: 0.9 {RATIO}
ALP SERPL-CCNC: 175 U/L (ref 55–142)
ALT SERPL W P-5'-P-CCNC: 14 U/L (ref 13–56)
ANION GAP SERPL CALCULATED.3IONS-SCNC: 9 MMOL/L (ref 7–16)
AST SERPL W P-5'-P-CCNC: 12 U/L (ref 15–37)
BACTERIA #/AREA URNS HPF: ABNORMAL /HPF
BASOPHILS # BLD AUTO: 0.02 K/UL (ref 0–0.2)
BASOPHILS NFR BLD AUTO: 0.1 % (ref 0–1)
BILIRUB SERPL-MCNC: 0.6 MG/DL (ref ?–1.2)
BILIRUB UR QL STRIP: NEGATIVE
BUN SERPL-MCNC: 27 MG/DL (ref 7–18)
BUN/CREAT SERPL: 35 (ref 6–20)
CALCIUM SERPL-MCNC: 8.3 MG/DL (ref 8.5–10.1)
CHLORIDE SERPL-SCNC: 107 MMOL/L (ref 98–107)
CLARITY UR: ABNORMAL
CO2 SERPL-SCNC: 27 MMOL/L (ref 21–32)
COLOR UR: YELLOW
CREAT SERPL-MCNC: 0.77 MG/DL (ref 0.55–1.02)
DIFFERENTIAL METHOD BLD: ABNORMAL
EGFR (NO RACE VARIABLE) (RUSH/TITUS): 83 ML/MIN/1.73M2
EOSINOPHIL # BLD AUTO: 0 K/UL (ref 0–0.5)
EOSINOPHIL NFR BLD AUTO: 0 % (ref 1–4)
ERYTHROCYTE [DISTWIDTH] IN BLOOD BY AUTOMATED COUNT: 13.1 % (ref 11.5–14.5)
GLOBULIN SER-MCNC: 3.2 G/DL (ref 2–4)
GLUCOSE SERPL-MCNC: 146 MG/DL (ref 74–106)
GLUCOSE UR STRIP-MCNC: NORMAL MG/DL
HCT VFR BLD AUTO: 36.7 % (ref 38–47)
HGB BLD-MCNC: 12.1 G/DL (ref 12–16)
IMM GRANULOCYTES # BLD AUTO: 0.04 K/UL (ref 0–0.04)
IMM GRANULOCYTES NFR BLD: 0.3 % (ref 0–0.4)
INDIRECT COOMBS: NORMAL
KETONES UR STRIP-SCNC: 10 MG/DL
LACTATE SERPL-SCNC: 1.3 MMOL/L (ref 0.4–2)
LEUKOCYTE ESTERASE UR QL STRIP: ABNORMAL
LYMPHOCYTES # BLD AUTO: 0.7 K/UL (ref 1–4.8)
LYMPHOCYTES NFR BLD AUTO: 5.2 % (ref 27–41)
MAGNESIUM SERPL-MCNC: 1.6 MG/DL (ref 1.7–2.3)
MCH RBC QN AUTO: 31.8 PG (ref 27–31)
MCHC RBC AUTO-ENTMCNC: 33 G/DL (ref 32–36)
MCV RBC AUTO: 96.3 FL (ref 80–96)
MONOCYTES # BLD AUTO: 0.53 K/UL (ref 0–0.8)
MONOCYTES NFR BLD AUTO: 3.9 % (ref 2–6)
MPC BLD CALC-MCNC: 10.9 FL (ref 9.4–12.4)
MUCOUS, UA: ABNORMAL /LPF
NEUTROPHILS # BLD AUTO: 12.28 K/UL (ref 1.8–7.7)
NEUTROPHILS NFR BLD AUTO: 90.5 % (ref 53–65)
NITRITE UR QL STRIP: NEGATIVE
NRBC # BLD AUTO: 0 X10E3/UL
NRBC, AUTO (.00): 0 %
PH UR STRIP: 7.5 PH UNITS
PLATELET # BLD AUTO: 253 K/UL (ref 150–400)
POC OCCULT BLOOD STOOL: NEGATIVE
POTASSIUM SERPL-SCNC: 4 MMOL/L (ref 3.5–5.1)
PROT SERPL-MCNC: 6 G/DL (ref 6.4–8.2)
PROT UR QL STRIP: 50
RBC # BLD AUTO: 3.81 M/UL (ref 4.2–5.4)
RBC # UR STRIP: NEGATIVE /UL
RBC #/AREA URNS HPF: 1 /HPF
RH BLD: NORMAL
SODIUM SERPL-SCNC: 139 MMOL/L (ref 136–145)
SP GR UR STRIP: 1.02
SPECIMEN OUTDATE: NORMAL
SQUAMOUS #/AREA URNS LPF: ABNORMAL /HPF
UROBILINOGEN UR STRIP-ACNC: 2 MG/DL
WBC # BLD AUTO: 13.57 K/UL (ref 4.5–11)
WBC #/AREA URNS HPF: 46 /HPF

## 2024-08-25 PROCEDURE — 86900 BLOOD TYPING SEROLOGIC ABO: CPT | Performed by: EMERGENCY MEDICINE

## 2024-08-25 PROCEDURE — 85025 COMPLETE CBC W/AUTO DIFF WBC: CPT | Performed by: EMERGENCY MEDICINE

## 2024-08-25 PROCEDURE — 36415 COLL VENOUS BLD VENIPUNCTURE: CPT | Performed by: EMERGENCY MEDICINE

## 2024-08-25 PROCEDURE — 96365 THER/PROPH/DIAG IV INF INIT: CPT | Mod: 59

## 2024-08-25 PROCEDURE — 81001 URINALYSIS AUTO W/SCOPE: CPT | Performed by: EMERGENCY MEDICINE

## 2024-08-25 PROCEDURE — 99285 EMERGENCY DEPT VISIT HI MDM: CPT | Mod: 25

## 2024-08-25 PROCEDURE — 93005 ELECTROCARDIOGRAM TRACING: CPT

## 2024-08-25 PROCEDURE — 96361 HYDRATE IV INFUSION ADD-ON: CPT

## 2024-08-25 PROCEDURE — 87086 URINE CULTURE/COLONY COUNT: CPT | Performed by: EMERGENCY MEDICINE

## 2024-08-25 PROCEDURE — 82272 OCCULT BLD FECES 1-3 TESTS: CPT

## 2024-08-25 PROCEDURE — 63600175 PHARM REV CODE 636 W HCPCS: Performed by: EMERGENCY MEDICINE

## 2024-08-25 PROCEDURE — 83605 ASSAY OF LACTIC ACID: CPT | Performed by: EMERGENCY MEDICINE

## 2024-08-25 PROCEDURE — 81003 URINALYSIS AUTO W/O SCOPE: CPT | Performed by: EMERGENCY MEDICINE

## 2024-08-25 PROCEDURE — 25000003 PHARM REV CODE 250: Performed by: EMERGENCY MEDICINE

## 2024-08-25 PROCEDURE — 87040 BLOOD CULTURE FOR BACTERIA: CPT | Performed by: EMERGENCY MEDICINE

## 2024-08-25 PROCEDURE — 87077 CULTURE AEROBIC IDENTIFY: CPT | Performed by: EMERGENCY MEDICINE

## 2024-08-25 PROCEDURE — 83735 ASSAY OF MAGNESIUM: CPT | Performed by: EMERGENCY MEDICINE

## 2024-08-25 PROCEDURE — 87186 SC STD MICRODIL/AGAR DIL: CPT | Performed by: EMERGENCY MEDICINE

## 2024-08-25 PROCEDURE — 25500020 PHARM REV CODE 255: Performed by: EMERGENCY MEDICINE

## 2024-08-25 PROCEDURE — 86850 RBC ANTIBODY SCREEN: CPT | Performed by: EMERGENCY MEDICINE

## 2024-08-25 PROCEDURE — 86901 BLOOD TYPING SEROLOGIC RH(D): CPT | Performed by: EMERGENCY MEDICINE

## 2024-08-25 PROCEDURE — 80053 COMPREHEN METABOLIC PANEL: CPT | Performed by: EMERGENCY MEDICINE

## 2024-08-25 PROCEDURE — 96375 TX/PRO/DX INJ NEW DRUG ADDON: CPT

## 2024-08-25 RX ORDER — ONDANSETRON 4 MG/1
4 TABLET, ORALLY DISINTEGRATING ORAL EVERY 6 HOURS PRN
Qty: 20 TABLET | Refills: 1 | Status: SHIPPED | OUTPATIENT
Start: 2024-08-25

## 2024-08-25 RX ORDER — PANTOPRAZOLE SODIUM 40 MG/10ML
80 INJECTION, POWDER, LYOPHILIZED, FOR SOLUTION INTRAVENOUS
Status: COMPLETED | OUTPATIENT
Start: 2024-08-25 | End: 2024-08-25

## 2024-08-25 RX ORDER — CEPHALEXIN 500 MG/1
500 CAPSULE ORAL 4 TIMES DAILY
Qty: 20 CAPSULE | Refills: 0 | Status: SHIPPED | OUTPATIENT
Start: 2024-08-25 | End: 2024-08-30

## 2024-08-25 RX ORDER — IOPAMIDOL 755 MG/ML
100 INJECTION, SOLUTION INTRAVASCULAR
Status: COMPLETED | OUTPATIENT
Start: 2024-08-25 | End: 2024-08-25

## 2024-08-25 RX ADMIN — SODIUM CHLORIDE, POTASSIUM CHLORIDE, SODIUM LACTATE AND CALCIUM CHLORIDE 1497 ML: 600; 310; 30; 20 INJECTION, SOLUTION INTRAVENOUS at 11:08

## 2024-08-25 RX ADMIN — IOPAMIDOL 100 ML: 755 INJECTION, SOLUTION INTRAVENOUS at 12:08

## 2024-08-25 RX ADMIN — CEFTRIAXONE SODIUM 1 G: 1 INJECTION, POWDER, FOR SOLUTION INTRAMUSCULAR; INTRAVENOUS at 01:08

## 2024-08-25 RX ADMIN — PANTOPRAZOLE SODIUM 80 MG: 40 INJECTION, POWDER, LYOPHILIZED, FOR SOLUTION INTRAVENOUS at 11:08

## 2024-08-25 NOTE — ED PROVIDER NOTES
Encounter Date: 8/25/2024    SCRIBE #1 NOTE: I, Hansa Romero, am scribing for, and in the presence of,  Daniel Michel MD.       History     Chief Complaint   Patient presents with    Vomiting     Presents to ED via EMS from [facility] for c/o vomiting coffee ground emesis.     This patient is a 68 y.o. female that presents to the ED via EMS from [facility] with c/o Vomiting Coffee Ground Emesis. Patient's baseline is non verbal. PMHx consists of Comfort measures only status.     The history is provided by the EMS personnel and the nursing home. No  was used.     Review of patient's allergies indicates:   Allergen Reactions    Ativan [lorazepam]     Prolixin [fluphenazine hcl]      Past Medical History:   Diagnosis Date    Comfort measures only status 12/19/2022    Constipation     Dementia, presenile with depression, with behavioral disturbance     Esophageal ulcer with bleeding     Gastroesophageal reflux disease with esophagitis without hemorrhage 09/26/2021    Hyperlipidemia     Schizophrenia     Seizure disorder 08/25/2021     Past Surgical History:   Procedure Laterality Date    EXTRACTION OF TOOTH N/A 4/25/2022    Procedure: EXTRACTION, TOOTH;  Surgeon: Rob Farris DMD, MD;  Location: [facility];  Service: Oral Surgery;  Laterality: N/A;     Family History   Family history unknown: Yes     Social History     Tobacco Use    Smoking status: Never    Smokeless tobacco: Never   Substance Use Topics    Alcohol use: Not Currently    Drug use: Never     Review of Systems   Unable to perform ROS: Acuity of condition       Physical Exam     Initial Vitals [08/25/24 1050]   BP Pulse Resp Temp SpO2   (!) 82/47 (!) 115 (!) 24 99.3 °F (37.4 °C) (!) 91 %      MAP       --         Physical Exam    Nursing note and vitals reviewed.  HENT:   Head: Normocephalic and atraumatic.   Mouth/Throat: Oropharynx is clear and moist.   Eyes: Pupils are  equal, round, and reactive to light.   Neck: Neck supple.   Normal range of motion.  Cardiovascular:  Normal rate and regular rhythm.           Pulmonary/Chest: Effort normal and breath sounds normal.   Abdominal: Abdomen is soft. She exhibits no distension.   Musculoskeletal:      Cervical back: Normal range of motion and neck supple.      Comments: Patient's Left Arm is in a sling.     Neurological: She is alert.   Skin: Skin is warm. Capillary refill takes less than 2 seconds.   Psychiatric: She has a normal mood and affect.         ED Course   Procedures  Labs Reviewed   COMPREHENSIVE METABOLIC PANEL - Abnormal       Result Value    Sodium 139      Potassium 4.0      Chloride 107      CO2 27      Anion Gap 9      Glucose 146 (*)     BUN 27 (*)     Creatinine 0.77      BUN/Creatinine Ratio 35 (*)     Calcium 8.3 (*)     Total Protein 6.0 (*)     Albumin 2.8 (*)     Globulin 3.2      A/G Ratio 0.9      Bilirubin, Total 0.6      Alk Phos 175 (*)     ALT 14      AST 12 (*)     eGFR 83     URINALYSIS, REFLEX TO URINE CULTURE - Abnormal    Color, UA Yellow      Clarity, UA Ex.Turbid      pH, UA 7.5      Leukocytes, UA Moderate (*)     Nitrites, UA Negative      Protein, UA 50 (*)     Glucose, UA Normal      Ketones, UA 10 (*)     Urobilinogen, UA 2 (*)     Bilirubin, UA Negative      Blood, UA Negative      Specific Gravity, UA 1.021     MAGNESIUM - Abnormal    Magnesium 1.6 (*)    CBC WITH DIFFERENTIAL - Abnormal    WBC 13.57 (*)     RBC 3.81 (*)     Hemoglobin 12.1      Hematocrit 36.7 (*)     MCV 96.3 (*)     MCH 31.8 (*)     MCHC 33.0      RDW 13.1      Platelet Count 253      MPV 10.9      Neutrophils % 90.5 (*)     Lymphocytes % 5.2 (*)     Monocytes % 3.9      Eosinophils % 0.0 (*)     Basophils % 0.1      Immature Granulocytes % 0.3      nRBC, Auto 0.0      Neutrophils, Abs 12.28 (*)     Lymphocytes, Absolute 0.70 (*)     Monocytes, Absolute 0.53      Eosinophils, Absolute 0.00      Basophils, Absolute 0.02       Immature Granulocytes, Absolute 0.04      nRBC, Absolute 0.00      Diff Type Auto     URINALYSIS, MICROSCOPIC - Abnormal    WBC, UA 46 (*)     RBC, UA 1      Bacteria, UA Many (*)     Squamous Epithelial Cells, UA Occasional (*)     Mucous Occasional (*)    LACTIC ACID, PLASMA - Normal    Lactic Acid 1.3     POCT OCCULT BLOOD (STOOL) - Normal    Fecal Occult Blood Negative     CULTURE, BLOOD   CULTURE, BLOOD   CULTURE, URINE   CBC W/ AUTO DIFFERENTIAL    Narrative:     The following orders were created for panel order CBC auto differential.  Procedure                               Abnormality         Status                     ---------                               -----------         ------                     CBC with Differential[[id number]]       Abnormal            Final result                 Please view results for these tests on the individual orders.   TYPE & SCREEN    Specimen Outdate 08/28/2024 23:59      Group & Rh A NEG      Indirect Pepe NEG       EKG Readings: (Independently Interpreted)   Heart Rate: 106 bpm.   Sinus tachycardia   Anterior T wave abnormality is nonspecific   Borderline ECG       Imaging Results              CT Abdomen Pelvis With IV Contrast NO Oral Contrast (Final result)  Result time 08/25/24 13:02:41      Final result by Francisco Goldsmith II, MD (08/25/24 13:02:41)                   Impression:      Increased fluid and stool in the stomach and bowel could indicate gastro enterocolitis.  No other definite acute findings.      Electronically signed by: Francisco Goldsmith  Date:    08/25/2024  Time:    13:02               Narrative:    EXAMINATION:  CT ABDOMEN PELVIS WITH IV CONTRAST    CLINICAL HISTORY:  Bowel obstruction suspected;    TECHNIQUE:  Axial CT imaging of the abdomen and pelvis is performed with intravenous contrast. Contrast dose is 100 cc Isovue 370.    CT dose reduction technique used - Dose Rite and tube current modulation.    COMPARISON:  None  available    FINDINGS:  Cardiac and lung bases are within normal limits    CT abdomen: The liver, spleen, pancreas and adrenal glands are normal in size and enhancement.  No evidence of focal lesion is demonstrated in these solid organs.    Kidneys are normal in size and enhancement.  No evidence of hydronephrosis or nephrolithiasis is seen.    There is a large hiatal hernia present in there is increased fluid in the stomach.  There is increased fluid and stool in the small and large bowel.  Bowel caliber is normal and no wall thickening or adjacent inflammatory change is seen.  No evidence of free fluid or free air is present.  Appendix appears normal.    CT pelvis: The pelvic bowel appears within normal limits.  Bladder shows no evidence of abnormality.  The pelvic organs show no evidence of abnormality.                                       X-Ray Chest AP Portable (Final result)  Result time 08/25/24 12:05:45      Final result by Francisco Goldsmith II, MD (08/25/24 12:05:45)                   Impression:      No evidence of acute cardiopulmonary disease.      Electronically signed by: Francisco Goldsmith  Date:    08/25/2024  Time:    12:05               Narrative:    EXAMINATION:  XR CHEST AP PORTABLE    CLINICAL HISTORY:  Sepsis;    COMPARISON:  20 May 2023    TECHNIQUE:  XR CHEST AP PORTABLE    FINDINGS:  The heart and mediastinum are normal in size and configuration.  The pulmonary vascularity is normal in caliber.  No lung infiltrates, effusions, pneumothorax or other abnormality is demonstrated.                                    X-Rays:   Independently Interpreted Readings:   Other Readings:  EXAMINATION:  XR CHEST AP PORTABLE     CLINICAL HISTORY:  Sepsis;     COMPARISON:  20 May 2023     TECHNIQUE:  XR CHEST AP PORTABLE     FINDINGS:  The heart and mediastinum are normal in size and configuration.  The pulmonary vascularity is normal in caliber.  No lung infiltrates, effusions, pneumothorax or other  abnormality is demonstrated.     Impression:     No evidence of acute cardiopulmonary disease.        Electronically signed by:Francisco Goldsmith  Date:                                            08/25/2024  Time:                                           12:05      Medications   pantoprazole injection 80 mg (80 mg Intravenous Given 8/25/24 1115)   lactated ringers bolus 1,497 mL (0 mLs Intravenous Stopped 8/25/24 1340)   cefTRIAXone (Rocephin) 1 g in D5W 100 mL IVPB (MB+) (0 g Intravenous Stopped 8/25/24 1417)   iopamidoL (ISOVUE-370) injection 100 mL (100 mLs Intravenous Given 8/25/24 1255)     Medical Decision Making  Amount and/or Complexity of Data Reviewed  Labs: ordered.  Radiology: ordered.    Risk  Prescription drug management.              Attending Attestation:           Physician Attestation for Scribe:  Physician Attestation Statement for Scribe #1: I, Daniel Payton MD, reviewed documentation, as scribed by Hansa Romero in my presence, and it is both accurate and complete.             ED Course as of 08/25/24 1528   Sun Aug 25, 2024   1054 Medical decision-making:  Differential diagnosis includes nausea, vomiting, gastroenteritis, upper GI bleed.  All testing ordered and interpreted by me. [BB]   1144 Stool is Hemoccult negative. [BB]   1209 08/25/24 1208  X-Ray Chest AP Portable  Performed: 08/25/24 1109  Final         Impression: No evidence of acute cardiopulmonary disease. Electronically signed by: Francisco Goldsmith Date: 08/25/2024 Time: 12:05       [CM]   1209 Lactic acid is normal.  Magnesium is slightly low at 1.6.  CBC shows elevated white count of 13.7, otherwise normal.  CMP shows elevated BUN to creatinine ratio, otherwise unremarkable. [BB]   1210 Chest x-ray by my interpretation shows no acute disease. [BB]   1248 Urinalysis shows UTI with 46 white cells, many bacteria. [BB]   1253 EKG by my interpretation shows sinus rhythm, rate of 106, no acute ST segment changes. [BB]   1344 CT abdomen  shows increased fluid consistent with gastroenteritis colitis.  No other acute findings. [BB]   1355 Hemoccult is negative.  Patient's hematocrit has not decreased from baseline.  I do not feel this is a GI bleed. [BB]      ED Course User Index  [BB] Daniel Payton MD  [CM] Hansa Romero                           Clinical Impression:  Final diagnoses:  [R00.0] Tachycardia  [R11.2] Nausea and vomiting, unspecified vomiting type (Primary)  [N39.0] Urinary tract infection without hematuria, site unspecified          ED Disposition Condition    Discharge Stable          ED Prescriptions       Medication Sig Dispense Start Date End Date Auth. Provider    cephALEXin (KEFLEX) 500 MG capsule Take 1 capsule (500 mg total) by mouth 4 (four) times daily. for 5 days 20 capsule 8/25/2024 8/30/2024 Daniel Payton MD    ondansetron (ZOFRAN-ODT) 4 MG TbDL Take 1 tablet (4 mg total) by mouth every 6 (six) hours as needed (P.r.n. nausea or vomiting). 20 tablet 8/25/2024 -- Daniel Payton MD          Follow-up Information    None          Daniel Payton MD  08/25/24 1921

## 2024-08-25 NOTE — ED NOTES
Submitted transfer request form to [facility]   Confirmation #: [id number]  Submission Date / Time: Aug 25, 2024 2:24 PM

## 2024-08-25 NOTE — DISCHARGE INSTRUCTIONS
Take antibiotics as prescribed.  Return to emergency department for any worsening or further problems.  Follow up with primary care provider as needed.

## 2024-08-26 LAB
OHS QRS DURATION: 78 MS
OHS QTC CALCULATION: 409 MS

## 2024-08-27 LAB — UA COMPLETE W REFLEX CULTURE PNL UR: ABNORMAL

## 2024-08-31 LAB
BACTERIA BLD CULT: NORMAL
BACTERIA BLD CULT: NORMAL

## 2024-09-08 ENCOUNTER — HOSPITAL ENCOUNTER (EMERGENCY)
Facility: HOSPITAL | Age: 69
Discharge: NURSING, REHAB OR LONG-TERM CARE | End: 2024-09-08
Attending: EMERGENCY MEDICINE
Payer: MEDICARE

## 2024-09-08 VITALS
OXYGEN SATURATION: 97 % | SYSTOLIC BLOOD PRESSURE: 115 MMHG | HEIGHT: 64 IN | WEIGHT: 110 LBS | TEMPERATURE: 98 F | HEART RATE: 94 BPM | RESPIRATION RATE: 17 BRPM | DIASTOLIC BLOOD PRESSURE: 69 MMHG | BODY MASS INDEX: 18.78 KG/M2

## 2024-09-08 DIAGNOSIS — R11.2 NAUSEA AND VOMITING, UNSPECIFIED VOMITING TYPE: Primary | ICD-10-CM

## 2024-09-08 DIAGNOSIS — N39.0 URINARY TRACT INFECTION WITHOUT HEMATURIA, SITE UNSPECIFIED: ICD-10-CM

## 2024-09-08 DIAGNOSIS — K59.00 CONSTIPATION, UNSPECIFIED CONSTIPATION TYPE: ICD-10-CM

## 2024-09-08 LAB
ALBUMIN SERPL BCP-MCNC: 3.1 G/DL (ref 3.5–5)
ALBUMIN/GLOB SERPL: 1 {RATIO}
ALP SERPL-CCNC: 149 U/L (ref 55–142)
ALT SERPL W P-5'-P-CCNC: 14 U/L (ref 13–56)
ANION GAP SERPL CALCULATED.3IONS-SCNC: 11 MMOL/L (ref 7–16)
AST SERPL W P-5'-P-CCNC: 14 U/L (ref 15–37)
BACTERIA #/AREA URNS HPF: ABNORMAL /HPF
BASOPHILS # BLD AUTO: 0.02 K/UL (ref 0–0.2)
BASOPHILS NFR BLD AUTO: 0.2 % (ref 0–1)
BILIRUB SERPL-MCNC: 0.6 MG/DL (ref ?–1.2)
BILIRUB UR QL STRIP: NEGATIVE
BUN SERPL-MCNC: 12 MG/DL (ref 7–18)
BUN/CREAT SERPL: 19 (ref 6–20)
CALCIUM SERPL-MCNC: 8.6 MG/DL (ref 8.5–10.1)
CHLORIDE SERPL-SCNC: 108 MMOL/L (ref 98–107)
CLARITY UR: ABNORMAL
CO2 SERPL-SCNC: 27 MMOL/L (ref 21–32)
COLOR UR: YELLOW
CREAT SERPL-MCNC: 0.64 MG/DL (ref 0.55–1.02)
DIFFERENTIAL METHOD BLD: ABNORMAL
EGFR (NO RACE VARIABLE) (RUSH/TITUS): 95 ML/MIN/1.73M2
EOSINOPHIL # BLD AUTO: 0.05 K/UL (ref 0–0.5)
EOSINOPHIL NFR BLD AUTO: 0.4 % (ref 1–4)
ERYTHROCYTE [DISTWIDTH] IN BLOOD BY AUTOMATED COUNT: 13.8 % (ref 11.5–14.5)
GLOBULIN SER-MCNC: 3 G/DL (ref 2–4)
GLUCOSE SERPL-MCNC: 152 MG/DL (ref 74–106)
GLUCOSE UR STRIP-MCNC: NORMAL MG/DL
HCT VFR BLD AUTO: 37.6 % (ref 38–47)
HGB BLD-MCNC: 12.1 G/DL (ref 12–16)
IMM GRANULOCYTES # BLD AUTO: 0.05 K/UL (ref 0–0.04)
IMM GRANULOCYTES NFR BLD: 0.4 % (ref 0–0.4)
KETONES UR STRIP-SCNC: ABNORMAL MG/DL
LEUKOCYTE ESTERASE UR QL STRIP: ABNORMAL
LIPASE SERPL-CCNC: 26 U/L (ref 16–77)
LYMPHOCYTES # BLD AUTO: 0.82 K/UL (ref 1–4.8)
LYMPHOCYTES NFR BLD AUTO: 6.5 % (ref 27–41)
MCH RBC QN AUTO: 31.4 PG (ref 27–31)
MCHC RBC AUTO-ENTMCNC: 32.2 G/DL (ref 32–36)
MCV RBC AUTO: 97.7 FL (ref 80–96)
MONOCYTES # BLD AUTO: 0.6 K/UL (ref 0–0.8)
MONOCYTES NFR BLD AUTO: 4.7 % (ref 2–6)
MPC BLD CALC-MCNC: 10.5 FL (ref 9.4–12.4)
NEUTROPHILS # BLD AUTO: 11.15 K/UL (ref 1.8–7.7)
NEUTROPHILS NFR BLD AUTO: 87.8 % (ref 53–65)
NITRITE UR QL STRIP: NEGATIVE
NRBC # BLD AUTO: 0 X10E3/UL
NRBC, AUTO (.00): 0 %
PH UR STRIP: 7 PH UNITS
PLATELET # BLD AUTO: 213 K/UL (ref 150–400)
POTASSIUM SERPL-SCNC: 3.8 MMOL/L (ref 3.5–5.1)
PROT SERPL-MCNC: 6.1 G/DL (ref 6.4–8.2)
PROT UR QL STRIP: 70
RBC # BLD AUTO: 3.85 M/UL (ref 4.2–5.4)
RBC # UR STRIP: NEGATIVE /UL
RBC #/AREA URNS HPF: 2 /HPF
SODIUM SERPL-SCNC: 142 MMOL/L (ref 136–145)
SP GR UR STRIP: 1.02
SQUAMOUS #/AREA URNS LPF: ABNORMAL /HPF
UROBILINOGEN UR STRIP-ACNC: NORMAL MG/DL
WBC # BLD AUTO: 12.69 K/UL (ref 4.5–11)
WBC #/AREA URNS HPF: >182 /HPF
WBC CLUMPS, UA: ABNORMAL /HPF
YEAST #/AREA URNS HPF: ABNORMAL /HPF

## 2024-09-08 PROCEDURE — 87077 CULTURE AEROBIC IDENTIFY: CPT | Performed by: EMERGENCY MEDICINE

## 2024-09-08 PROCEDURE — 81001 URINALYSIS AUTO W/SCOPE: CPT | Performed by: EMERGENCY MEDICINE

## 2024-09-08 PROCEDURE — 96361 HYDRATE IV INFUSION ADD-ON: CPT

## 2024-09-08 PROCEDURE — 87086 URINE CULTURE/COLONY COUNT: CPT | Performed by: EMERGENCY MEDICINE

## 2024-09-08 PROCEDURE — 63600175 PHARM REV CODE 636 W HCPCS: Performed by: EMERGENCY MEDICINE

## 2024-09-08 PROCEDURE — 83690 ASSAY OF LIPASE: CPT | Performed by: EMERGENCY MEDICINE

## 2024-09-08 PROCEDURE — 99284 EMERGENCY DEPT VISIT MOD MDM: CPT | Mod: 25

## 2024-09-08 PROCEDURE — 36415 COLL VENOUS BLD VENIPUNCTURE: CPT | Performed by: EMERGENCY MEDICINE

## 2024-09-08 PROCEDURE — 80053 COMPREHEN METABOLIC PANEL: CPT | Performed by: EMERGENCY MEDICINE

## 2024-09-08 PROCEDURE — 87186 SC STD MICRODIL/AGAR DIL: CPT | Performed by: EMERGENCY MEDICINE

## 2024-09-08 PROCEDURE — 81003 URINALYSIS AUTO W/O SCOPE: CPT | Performed by: EMERGENCY MEDICINE

## 2024-09-08 PROCEDURE — 85025 COMPLETE CBC W/AUTO DIFF WBC: CPT | Performed by: EMERGENCY MEDICINE

## 2024-09-08 PROCEDURE — 96365 THER/PROPH/DIAG IV INF INIT: CPT

## 2024-09-08 PROCEDURE — 25000003 PHARM REV CODE 250: Performed by: EMERGENCY MEDICINE

## 2024-09-08 PROCEDURE — 96375 TX/PRO/DX INJ NEW DRUG ADDON: CPT

## 2024-09-08 RX ORDER — CEPHALEXIN 500 MG/1
500 CAPSULE ORAL 4 TIMES DAILY
Qty: 20 CAPSULE | Refills: 0 | Status: SHIPPED | OUTPATIENT
Start: 2024-09-08 | End: 2024-09-13

## 2024-09-08 RX ORDER — CEPHALEXIN 500 MG/1
500 CAPSULE ORAL 4 TIMES DAILY
Qty: 20 CAPSULE | Refills: 0 | Status: SHIPPED | OUTPATIENT
Start: 2024-09-08 | End: 2024-09-08

## 2024-09-08 RX ORDER — ONDANSETRON 4 MG/1
4 TABLET, ORALLY DISINTEGRATING ORAL EVERY 6 HOURS PRN
Qty: 20 TABLET | Refills: 1 | Status: SHIPPED | OUTPATIENT
Start: 2024-09-08

## 2024-09-08 RX ORDER — ONDANSETRON HYDROCHLORIDE 2 MG/ML
4 INJECTION, SOLUTION INTRAVENOUS
Status: COMPLETED | OUTPATIENT
Start: 2024-09-08 | End: 2024-09-08

## 2024-09-08 RX ADMIN — SODIUM CHLORIDE 1000 ML: 9 INJECTION, SOLUTION INTRAVENOUS at 02:09

## 2024-09-08 RX ADMIN — ONDANSETRON 4 MG: 2 INJECTION INTRAMUSCULAR; INTRAVENOUS at 02:09

## 2024-09-08 RX ADMIN — CEFTRIAXONE SODIUM 1 G: 1 INJECTION, POWDER, FOR SOLUTION INTRAMUSCULAR; INTRAVENOUS at 03:09

## 2024-09-08 NOTE — ED NOTES
Transport information sent to [facility] for transfer to [facility] - confirmation #  [id number]

## 2024-09-08 NOTE — DISCHARGE INSTRUCTIONS
Perform enema for constipation.  Take Zofran as needed for nausea and vomiting.  Return to emergency department for any worsening or further problems.  Take antibiotics as prescribed.

## 2024-09-08 NOTE — ED PROVIDER NOTES
Encounter Date: 9/7/2024    SCRIBE #1 NOTE: I, Lulu Carlos, am scribing for, and in the presence of,  Daniel Payton MD. I have scribed the entire note.       History     Chief Complaint   Patient presents with    Emesis     Arrives from [facility] for reports of vomiting x 1. Patient is DNR.      68 y.o.female presented to the ED via EMS from [facility] for vomiting. She is nonverbal and is a DNR patient. Pt has a no HX of smoking and a medical HX of Comfort measures only status, Gastroesophageal reflux disease with esophagitis without hemorrhage, Seizure disorder, Constipation, Dementia, presenile with depression, with behavioral disturbance, Esophageal ulcer with bleeding, Hyperlipidemia, and Schizophrenia.           Review of patient's allergies indicates:   Allergen Reactions    Ativan [lorazepam]     Prolixin [fluphenazine hcl]      Past Medical History:   Diagnosis Date    Constipation     Dementia, presenile with depression, with behavioral disturbance     DNR (do not resuscitate)     severe dementia  sister requests comfort care only no procedures    Esophageal ulcer with bleeding     Essential (primary) hypertension     Schizophrenia     Seizure disorder 08/25/2021     Past Surgical History:   Procedure Laterality Date    EXTRACTION OF TOOTH N/A 4/25/2022    Procedure: EXTRACTION, TOOTH;  Surgeon: Rob Farris DMD, MD;  Location: [facility];  Service: Oral Surgery;  Laterality: N/A;     Family History   Family history unknown: Yes     Social History     Tobacco Use    Smoking status: Never    Smokeless tobacco: Never   Substance Use Topics    Alcohol use: Not Currently    Drug use: Never     Review of Systems   Reason unable to perform ROS: NONVERBAL.   Gastrointestinal:  Positive for nausea and vomiting.   All other systems reviewed and are negative.      Physical Exam     Initial Vitals   BP Pulse Resp Temp SpO2   09/08/24 0101 09/08/24 0101 09/08/24 0101 09/08/24 0102  09/08/24 0101   104/68 108 18 98.3 °F (36.8 °C) 96 %      MAP       --                Physical Exam    Nursing note and vitals reviewed.  HENT:   Head: Normocephalic and atraumatic.   Mouth/Throat: Oropharynx is clear and moist.   Eyes: Pupils are equal, round, and reactive to light.   Neck: Neck supple.   Normal range of motion.  Cardiovascular:  Normal rate and regular rhythm.           Pulmonary/Chest: Effort normal and breath sounds normal.   Abdominal: Abdomen is soft. She exhibits no distension.   Musculoskeletal:         General: Normal range of motion.      Cervical back: Normal range of motion and neck supple.     Neurological: She is alert.   Skin: Skin is warm. Capillary refill takes less than 2 seconds.   Psychiatric: She has a normal mood and affect.         ED Course   Procedures  Labs Reviewed   COMPREHENSIVE METABOLIC PANEL - Abnormal       Result Value    Sodium 142      Potassium 3.8      Chloride 108 (*)     CO2 27      Anion Gap 11      Glucose 152 (*)     BUN 12      Creatinine 0.64      BUN/Creatinine Ratio 19      Calcium 8.6      Total Protein 6.1 (*)     Albumin 3.1 (*)     Globulin 3.0      A/G Ratio 1.0      Bilirubin, Total 0.6      Alk Phos 149 (*)     ALT 14      AST 14 (*)     eGFR 95     URINALYSIS, REFLEX TO URINE CULTURE - Abnormal    Color, UA Yellow      Clarity, UA Ex.Turbid      pH, UA 7.0      Leukocytes, UA Large (*)     Nitrites, UA Negative      Protein, UA 70 (*)     Glucose, UA Normal      Ketones, UA Trace      Urobilinogen, UA Normal      Bilirubin, UA Negative      Blood, UA Negative      Specific Gravity, UA 1.020     CBC WITH DIFFERENTIAL - Abnormal    WBC 12.69 (*)     RBC 3.85 (*)     Hemoglobin 12.1      Hematocrit 37.6 (*)     MCV 97.7 (*)     MCH 31.4 (*)     MCHC 32.2      RDW 13.8      Platelet Count 213      MPV 10.5      Neutrophils % 87.8 (*)     Lymphocytes % 6.5 (*)     Monocytes % 4.7      Eosinophils % 0.4 (*)     Basophils % 0.2      Immature  Granulocytes % 0.4      nRBC, Auto 0.0      Neutrophils, Abs 11.15 (*)     Lymphocytes, Absolute 0.82 (*)     Monocytes, Absolute 0.60      Eosinophils, Absolute 0.05      Basophils, Absolute 0.02      Immature Granulocytes, Absolute 0.05 (*)     nRBC, Absolute 0.00      Diff Type Auto     URINALYSIS, MICROSCOPIC - Abnormal    WBC, UA >182 (*)     RBC, UA 2      Bacteria, UA Many (*)     Squamous Epithelial Cells, UA Occasional (*)     WBC Clumps Few (*)     Yeast, UA Many (*)    LIPASE - Normal    Lipase 26     CULTURE, URINE   CBC W/ AUTO DIFFERENTIAL    Narrative:     The following orders were created for panel order CBC W/ AUTO DIFFERENTIAL.  Procedure                               Abnormality         Status                     ---------                               -----------         ------                     CBC with Differential[[id number]]       Abnormal            Final result                 Please view results for these tests on the individual orders.          Imaging Results              XR ABDOMEN, ACUTE 2 OR MORE VIEWS WITH CHEST (In process)  Result time 09/08/24 02:23:33   Procedure changed from X-Ray Abdomen Flat And Erect                    Medications   sodium chloride 0.9% bolus 1,000 mL 1,000 mL (1,000 mLs Intravenous New Bag 9/8/24 0235)   ondansetron injection 4 mg (4 mg Intravenous Given 9/8/24 0235)     Medical Decision Making  Amount and/or Complexity of Data Reviewed  Labs: ordered.  Radiology: ordered.    Risk  Prescription drug management.              Attending Attestation:           Physician Attestation for Scribe:  Physician Attestation Statement for Scribe #1: I, Daniel Payton MD, reviewed documentation, as scribed by Lulu Carlos in my presence, and it is both accurate and complete.             ED Course as of 09/08/24 0258   Sun Sep 08, 2024   0144 Medical decision-making:  Differential diagnosis includes nausea, vomiting, gastroenteritis, small-bowel obstruction.  All  testing ordered and interpreted by me. [BB]   0255 Chest x-ray by my interpretation shows no acute disease.  Abdominal series shows large stool, no free air or air-fluid levels. [BB]   0256 Urinalysis shows UTI.  CBC is normal except white count of 12.69.  CMP is unremarkable. [BB]      ED Course User Index  [BB] Daniel Payton MD                             Clinical Impression:  Final diagnoses:  [R11.2] Nausea and vomiting, unspecified vomiting type (Primary)  [K59.00] Constipation, unspecified constipation type          ED Disposition Condition    Discharge Stable          ED Prescriptions       Medication Sig Dispense Start Date End Date Auth. Provider    ondansetron (ZOFRAN-ODT) 4 MG TbDL Take 1 tablet (4 mg total) by mouth every 6 (six) hours as needed (P.r.n. nausea or vomiting). 20 tablet 9/8/2024 -- Daniel Payton MD          Follow-up Information    None          Daniel Payton MD  09/08/24 0258

## 2024-09-08 NOTE — ED NOTES
Digital disimpaction performed per MD verbal order. Medium amount of hard brown pebble like stool removed. No stool left in the rectal vault.

## 2024-09-10 LAB — UA COMPLETE W REFLEX CULTURE PNL UR: ABNORMAL

## 2024-09-12 DIAGNOSIS — K92.0 COFFEE GROUND EMESIS: Primary | ICD-10-CM

## 2024-09-19 ENCOUNTER — OFFICE VISIT (OUTPATIENT)
Dept: GASTROENTEROLOGY | Facility: CLINIC | Age: 69
End: 2024-09-19
Payer: MEDICARE

## 2024-09-19 VITALS
HEART RATE: 89 BPM | SYSTOLIC BLOOD PRESSURE: 99 MMHG | WEIGHT: 110 LBS | HEIGHT: 64 IN | BODY MASS INDEX: 18.78 KG/M2 | DIASTOLIC BLOOD PRESSURE: 57 MMHG | OXYGEN SATURATION: 97 %

## 2024-09-19 DIAGNOSIS — K92.2 UGIB (UPPER GASTROINTESTINAL BLEED): ICD-10-CM

## 2024-09-19 DIAGNOSIS — K92.0 COFFEE GROUND EMESIS: Primary | ICD-10-CM

## 2024-09-19 DIAGNOSIS — K59.04 CHRONIC IDIOPATHIC CONSTIPATION: ICD-10-CM

## 2024-09-19 PROCEDURE — 99214 OFFICE O/P EST MOD 30 MIN: CPT | Mod: PBBFAC

## 2024-09-19 PROCEDURE — 99999 PR PBB SHADOW E&M-EST. PATIENT-LVL IV: CPT | Mod: PBBFAC,,,

## 2024-09-19 RX ORDER — SUCRALFATE 1 G/1
1 TABLET ORAL 4 TIMES DAILY
Qty: 120 TABLET | Refills: 2 | Status: SHIPPED | OUTPATIENT
Start: 2024-09-19

## 2024-09-19 RX ORDER — PANTOPRAZOLE SODIUM 40 MG/1
40 TABLET, DELAYED RELEASE ORAL 2 TIMES DAILY
Qty: 180 TABLET | Refills: 3
Start: 2024-09-19

## 2024-09-19 NOTE — PATIENT INSTRUCTIONS
- I recommend starting a daily fiber supplement with Citrucel or Fibercon (can purchase at your local pharmacy)  - I recommend that you also use Miralax 17 grams daily-to-twice daily as needed to have a regular, soft BM without straining you can adjust how often you need miralax, but start with daily dosing and go from there  - I recommend drinking at least 60-80 ounces of water daily unless you have a medical condition that requires fluid restriction

## 2024-09-19 NOTE — PROGRESS NOTES
CC:  Coffee-ground emesis      HPI 68 y.o. white female that is non verbal and lives in a nursing home.  Patient is a DNR.  Staff from the nursing home is with the patient and they state patient has been vomiting coffee ground emesis.  Patient is not according to her medication record taking Protonix.  We will add Protonix to patient's regimen 40 mg b.i.d. and Carafate.  We will schedule patient for upper scope.  Recent labs show hemoglobin 12.1 and hematocrit 37.6 with no elevated liver enzymes her alkaline phos is 149.  Staff with patient does admit the patient does have some constipation instructed staff that she needs to be getting her MiraLax daily up to twice a day to help aid in having a good bowel movement.  Medical records reviewed. Additional history supplemented by nursing.     Past Medical History:   Diagnosis Date    Constipation     Dementia, presenile with depression, with behavioral disturbance     DNR (do not resuscitate)     severe dementia  sister requests comfort care only no procedures    Esophageal ulcer with bleeding     Essential (primary) hypertension     Schizophrenia     Seizure disorder 08/25/2021         Past Surgical History:   Procedure Laterality Date    EXTRACTION OF TOOTH N/A 4/25/2022    Procedure: EXTRACTION, TOOTH;  Surgeon: Rob Farris DMD, MD;  Location: [facility];  Service: Oral Surgery;  Laterality: N/A;       Social History  Social History     Tobacco Use    Smoking status: Never    Smokeless tobacco: Never   Substance Use Topics    Alcohol use: Not Currently    Drug use: Never         Family History   Family history unknown: Yes       Review of Systems  General ROS: negative for chills, fever or weight loss  Psychological ROS: negative for hallucination, depression or suicidal ideation  Ophthalmic ROS: negative for blurry vision, photophobia or eye pain  ENT ROS: negative for epistaxis, sore throat or rhinorrhea  Respiratory ROS: no cough, shortness of breath, or  "wheezing  Cardiovascular ROS: no chest pain or dyspnea on exertion  Gastrointestinal ROS: no abdominal pain, change in bowel habits, or black/ bloody stools    Physical Examination  BP (!) 99/57   Pulse 89   Ht 5' 4" (1.626 m)   Wt 49.9 kg (110 lb)   SpO2 97%   BMI 18.88 kg/m²   General appearance: alert, cooperative, no distress  HENT: Normocephalic, atraumatic, neck symmetrical, no nasal discharge   Eyes: conjunctivae/corneas clear, PERRL, EOM's intact  Lungs: no labored breathing, symmetric chest wall expansion bilaterally  Heart: regular rate and rhythm without rub; no displacement of the PMI   Abdomen: soft, non-tender; bowel sounds normoactive; no organomegaly    Labs:  Lab Results   Component Value Date    WBC 12.69 (H) 09/08/2024    HGB 12.1 09/08/2024    HCT 37.6 (L) 09/08/2024    MCV 97.7 (H) 09/08/2024     09/08/2024       CMP  Sodium   Date Value Ref Range Status   09/08/2024 142 136 - 145 mmol/L Final     Potassium   Date Value Ref Range Status   09/08/2024 3.8 3.5 - 5.1 mmol/L Final     Chloride   Date Value Ref Range Status   09/08/2024 108 (H) 98 - 107 mmol/L Final     CO2   Date Value Ref Range Status   09/08/2024 27 21 - 32 mmol/L Final     Glucose   Date Value Ref Range Status   09/08/2024 152 (H) 74 - 106 mg/dL Final     BUN   Date Value Ref Range Status   09/08/2024 12 7 - 18 mg/dL Final     Creatinine   Date Value Ref Range Status   09/08/2024 0.64 0.55 - 1.02 mg/dL Final     Calcium   Date Value Ref Range Status   09/08/2024 8.6 8.5 - 10.1 mg/dL Final     Total Protein   Date Value Ref Range Status   09/09/2024 5.0 (L) 6.4 - 8.2 g/dL Final     Albumin   Date Value Ref Range Status   09/09/2024 2.6 (L) 3.5 - 5.0 g/dL Final     Bilirubin, Total   Date Value Ref Range Status   09/09/2024 0.4 >0.0 - 1.2 mg/dL Final     Alk Phos   Date Value Ref Range Status   09/09/2024 124 55 - 142 U/L Final     AST   Date Value Ref Range Status   09/09/2024 10 (L) 15 - 37 U/L Final     ALT   Date " Value Ref Range Status   09/09/2024 11 (L) 13 - 56 U/L Final     Anion Gap   Date Value Ref Range Status   09/08/2024 11 7 - 16 mmol/L Final     eGFR    Date Value Ref Range Status   04/06/2021 80       Comment:     The above 11 analytes were performed by [id number] [place] [address],[place] [id number]     eGFR   Date Value Ref Range Status   06/06/2022 76 >=60 mL/min/1.73m² Final       Imaging:  No pertinent imaging    Independently reviewed    Assessment: Suzie Gardner 67 yo WF here with:  Coffee-ground emesis  Constipation    Plan:   EGD scheduled for coffee-ground emesis  Protonix 40 mg b.i.d.  Carafate 1 g a.c. and HS  Follow-up in 4 months or sooner    45 minutes of total time spent on the encounter, which includes face to face time and non-face to face time preparing to see the patient (eg, review of tests), obtaining and/or reviewing separately obtained history, documenting clinical information in the electronic or other health record, Independently interpreting results (not separately reported) and communicating results to the patient/family/caregiver, or care coordination (not separately reported).       Carla Adams, FNP [facility]

## 2024-10-01 ENCOUNTER — ANESTHESIA (OUTPATIENT)
Dept: GASTROENTEROLOGY | Facility: HOSPITAL | Age: 69
End: 2024-10-01
Payer: MEDICARE

## 2024-10-01 ENCOUNTER — ANESTHESIA EVENT (OUTPATIENT)
Dept: GASTROENTEROLOGY | Facility: HOSPITAL | Age: 69
End: 2024-10-01
Payer: MEDICARE

## 2024-10-01 ENCOUNTER — HOSPITAL ENCOUNTER (OUTPATIENT)
Dept: GASTROENTEROLOGY | Facility: HOSPITAL | Age: 69
Discharge: HOME | End: 2024-10-01
Admitting: INTERNAL MEDICINE
Payer: MEDICARE

## 2024-10-01 VITALS
DIASTOLIC BLOOD PRESSURE: 77 MMHG | BODY MASS INDEX: 18.78 KG/M2 | WEIGHT: 110 LBS | TEMPERATURE: 99 F | HEIGHT: 64 IN | SYSTOLIC BLOOD PRESSURE: 137 MMHG | OXYGEN SATURATION: 94 % | HEART RATE: 88 BPM | RESPIRATION RATE: 18 BRPM

## 2024-10-01 DIAGNOSIS — K92.0 COFFEE GROUND EMESIS: ICD-10-CM

## 2024-10-01 PROCEDURE — 88342 IMHCHEM/IMCYTCHM 1ST ANTB: CPT | Mod: 26,,, | Performed by: PATHOLOGY

## 2024-10-01 PROCEDURE — 37000009 HC ANESTHESIA EA ADD 15 MINS

## 2024-10-01 PROCEDURE — 88312 SPECIAL STAINS GROUP 1: CPT | Mod: 26,,, | Performed by: PATHOLOGY

## 2024-10-01 PROCEDURE — 63600175 PHARM REV CODE 636 W HCPCS: Performed by: NURSE ANESTHETIST, CERTIFIED REGISTERED

## 2024-10-01 PROCEDURE — 37000008 HC ANESTHESIA 1ST 15 MINUTES

## 2024-10-01 PROCEDURE — 88341 IMHCHEM/IMCYTCHM EA ADD ANTB: CPT | Mod: 26,,, | Performed by: PATHOLOGY

## 2024-10-01 PROCEDURE — 27201423 OPTIME MED/SURG SUP & DEVICES STERILE SUPPLY

## 2024-10-01 PROCEDURE — 88342 IMHCHEM/IMCYTCHM 1ST ANTB: CPT | Mod: TC,91,SUR | Performed by: INTERNAL MEDICINE

## 2024-10-01 PROCEDURE — 88305 TISSUE EXAM BY PATHOLOGIST: CPT | Mod: TC,SUR | Performed by: INTERNAL MEDICINE

## 2024-10-01 PROCEDURE — 88305 TISSUE EXAM BY PATHOLOGIST: CPT | Mod: 26,59,, | Performed by: PATHOLOGY

## 2024-10-01 PROCEDURE — 25000003 PHARM REV CODE 250: Performed by: NURSE ANESTHETIST, CERTIFIED REGISTERED

## 2024-10-01 RX ORDER — SODIUM CHLORIDE 0.9 % (FLUSH) 0.9 %
10 SYRINGE (ML) INJECTION EVERY 6 HOURS PRN
Status: DISCONTINUED | OUTPATIENT
Start: 2024-10-01 | End: 2024-10-02 | Stop reason: HOSPADM

## 2024-10-01 RX ORDER — PROPOFOL 10 MG/ML
VIAL (ML) INTRAVENOUS
Status: DISCONTINUED | OUTPATIENT
Start: 2024-10-01 | End: 2024-10-01

## 2024-10-01 RX ORDER — SODIUM CHLORIDE, SODIUM LACTATE, POTASSIUM CHLORIDE, CALCIUM CHLORIDE 600; 310; 30; 20 MG/100ML; MG/100ML; MG/100ML; MG/100ML
INJECTION, SOLUTION INTRAVENOUS CONTINUOUS
Status: DISCONTINUED | OUTPATIENT
Start: 2024-10-01 | End: 2024-10-02 | Stop reason: HOSPADM

## 2024-10-01 RX ORDER — LIDOCAINE HYDROCHLORIDE 20 MG/ML
INJECTION, SOLUTION EPIDURAL; INFILTRATION; INTRACAUDAL; PERINEURAL
Status: DISCONTINUED | OUTPATIENT
Start: 2024-10-01 | End: 2024-10-01

## 2024-10-01 RX ADMIN — PROPOFOL 50 MG: 10 INJECTION, EMULSION INTRAVENOUS at 02:10

## 2024-10-01 RX ADMIN — PROPOFOL 40 MG: 10 INJECTION, EMULSION INTRAVENOUS at 02:10

## 2024-10-01 RX ADMIN — SODIUM CHLORIDE: 9 INJECTION, SOLUTION INTRAVENOUS at 02:10

## 2024-10-01 RX ADMIN — LIDOCAINE HYDROCHLORIDE 40 MG: 20 INJECTION, SOLUTION INTRAVENOUS at 02:10

## 2024-10-01 NOTE — ANESTHESIA POSTPROCEDURE EVALUATION
Anesthesia Post Evaluation    Patient: Suzie Hennessy    Procedure(s) Performed: EGD    Final Anesthesia Type: general      Patient location during evaluation: GI PACU  Patient participation: Yes- Able to Participate  Level of consciousness: awake and alert  Post-procedure vital signs: reviewed and stable  Pain management: adequate  Airway patency: patent  THOMAS mitigation strategies: Multimodal analgesia  PONV status at discharge: No PONV  Anesthetic complications: no      Cardiovascular status: hemodynamically stable and blood pressure returned to baseline  Respiratory status: spontaneous ventilation and room air  Hydration status: euvolemic  Follow-up not needed.  Comments: Refer to nursing note for pain/keyanna score upon discharge              Vitals Value Taken Time   /68 10/01/24 1430   Temp 37 °C (98.6 °F) 10/01/24 1425   Pulse 89 10/01/24 1433   Resp 16 10/01/24 1433   SpO2 98 % 10/01/24 1433   Vitals shown include unfiled device data.      No case tracking events are documented in the log.      Pain/Keyanna Score: Keyanna Score: 8 (10/1/2024  2:24 PM)

## 2024-10-01 NOTE — TRANSFER OF CARE
"Anesthesia Transfer of Care Note    Patient: Suzie Hennessy    Procedure(s) Performed: EGD    Patient location: GI    Anesthesia Type: general    Transport from OR: Transported from OR on room air with adequate spontaneous ventilation. Continuous ECG monitoring in transport. Continuous SpO2 monitoring in transport    Post pain: adequate analgesia    Post assessment: no apparent anesthetic complications    Post vital signs: stable    Level of consciousness: responds to stimulation, awake and sedated    Nausea/Vomiting: no nausea/vomiting    Complications: none    Transfer of care protocol was followed      Last vitals: Visit Vitals  BP (!) 100/58   Pulse 89   Temp 37 °C (98.6 °F) (Skin)   Resp 20   Ht 5' 4" (1.626 m)   Wt 49.9 kg (110 lb)   SpO2 97%   Breastfeeding No   BMI 18.88 kg/m²     "

## 2024-10-01 NOTE — H&P
History & Physical - Short Stay  Gastroenterology      SUBJECTIVE:     Procedure: EGD    Chief Complaint/Indication for Procedure: Coffee ground emesis    (Not in a hospital admission)      Review of patient's allergies indicates:   Allergen Reactions    Ativan [lorazepam]     Prolixin [fluphenazine hcl]         Past Medical History:   Diagnosis Date    Constipation     Dementia, presenile with depression, with behavioral disturbance     DNR (do not resuscitate)     severe dementia  sister requests comfort care only no procedures    Esophageal ulcer with bleeding     Essential (primary) hypertension     Schizophrenia     Seizure disorder 08/25/2021     Past Surgical History:   Procedure Laterality Date    EXTRACTION OF TOOTH N/A 4/25/2022    Procedure: EXTRACTION, TOOTH;  Surgeon: Rob Farris DMD, MD;  Location: [facility];  Service: Oral Surgery;  Laterality: N/A;     Family History   Family history unknown: Yes     Social History     Tobacco Use    Smoking status: Never    Smokeless tobacco: Never   Substance Use Topics    Alcohol use: Not Currently    Drug use: Never     OBJECTIVE:     Physical Exam:                                                       GENERAL:  Comfortable, in no acute distress.                                          NECK:  Supple.                                                               LUNGS:  Clear.                                                               CARDIAC:  Regular rate and rhythm.  S1, S2.  No murmur.                          ASSESSMENT/PLAN:     Assessment: Coffee ground emesis    Plan: EGD

## 2024-10-01 NOTE — ANESTHESIA PREPROCEDURE EVALUATION
10/01/2024  Suzie Hennessy is a 68 y.o., female.      Pre-op Assessment    I have reviewed the Patient Summary Reports.     I have reviewed the Nursing Notes. I have reviewed the NPO Status.   I have reviewed the Medications.     Review of Systems  Anesthesia Hx:  No problems with previous Anesthesia             Denies Family Hx of Anesthesia complications.    Denies Personal Hx of Anesthesia complications.                    Social:  Non-Smoker       Hematology/Oncology:  Hematology Normal   Oncology Normal                                   EENT/Dental:  EENT/Dental Normal           Cardiovascular:     Hypertension              ECG has been reviewed.                          Pulmonary:  Pulmonary Normal                       Renal/:  Renal/ Normal                 Hepatic/GI:   PUD,               Musculoskeletal:  Musculoskeletal Normal                Neurological:       Seizures                                Dermatological:  Skin Normal    Psych:  Psychiatric History                  Physical Exam  General: Well nourished    Airway:  Mallampati: II   Mouth Opening: Normal  TM Distance: Normal  Tongue: Normal  Neck ROM: Normal ROM    Dental:  Edentulous        Anesthesia Plan  Type of Anesthesia, risks & benefits discussed:    Anesthesia Type: Gen Natural Airway  Intra-op Monitoring Plan: Standard ASA Monitors  Post Op Pain Control Plan: multimodal analgesia  Induction:  IV  Informed Consent: Informed consent signed with the Patient and all parties understand the risks and agree with anesthesia plan.  All questions answered. Patient consented to blood products? Yes  ASA Score: 3  Day of Surgery Review of History & Physical: H&P Update referred to the surgeon/provider.I have interviewed and examined the patient. I have reviewed the patient's H&P dated: There are no significant changes.     Ready For  Surgery From Anesthesia Perspective.       Past Medical History:   Diagnosis Date    Constipation     Dementia, presenile with depression, with behavioral disturbance     DNR (do not resuscitate)     severe dementia  sister requests comfort care only no procedures    Esophageal ulcer with bleeding     Essential (primary) hypertension     Schizophrenia     Seizure disorder 08/25/2021       Past Surgical History:   Procedure Laterality Date    EXTRACTION OF TOOTH N/A 4/25/2022    Procedure: EXTRACTION, TOOTH;  Surgeon: Rob Farris DMD, MD;  Location: [facility];  Service: Oral Surgery;  Laterality: N/A;       Family History   Family history unknown: Yes       Social History     Socioeconomic History    Marital status: Single   Tobacco Use    Smoking status: Never    Smokeless tobacco: Never   Substance and Sexual Activity    Alcohol use: Not Currently    Drug use: Never     Social Drivers of Health     Financial Resource Strain: Low Risk  (5/22/2023)    Overall Financial Resource Strain (CARDIA)     Difficulty of Paying Living Expenses: Not hard at all   Food Insecurity: No Food Insecurity (5/22/2023)    Hunger Vital Sign     Worried About Running Out of Food in the Last Year: Never true     Ran Out of Food in the Last Year: Never true   Transportation Needs: No Transportation Needs (5/22/2023)    PRAPARE - Transportation     Lack of Transportation (Medical): No     Lack of Transportation (Non-Medical): No   Physical Activity: Inactive (5/22/2023)    Exercise Vital Sign     Days of Exercise per Week: 0 days     Minutes of Exercise per Session: 0 min   Stress: No Stress Concern Present (5/22/2023)    [employer] [employer] - Occupational Stress Questionnaire     Feeling of Stress : Not at all   Housing Stability: Low Risk  (5/22/2023)    Housing Stability Vital Sign     Unable to Pay for Housing in the Last Year: No     Number of Places Lived in the Last Year: 1     Unstable Housing in the  Last Year: No       Current Outpatient Medications   Medication Sig Dispense Refill    acetaminophen (TYLENOL) 500 MG tablet Take 500 mg by mouth every 6 (six) hours as needed for Pain.      bisacodyL (DULCOLAX) 10 mg Supp Place 10 mg rectally Every 3 (three) days.      cyproheptadine (PERIACTIN) 4 mg tablet Take 4 mg by mouth 3 (three) times daily.      docusate sodium (COLACE) 100 MG capsule Take 100 mg by mouth 2 (two) times daily.      folic acid (FOLVITE) 1 MG tablet Take 1 mg by mouth once daily.      INVEGA SUSTENNA 117 mg/0.75 mL Syrg injection Inject 117 mg into the muscle every 30 days.      levETIRAcetam (KEPPRA) 1000 MG tablet Take 1 tablet (1,000 mg total) by mouth 2 (two) times daily. 60 tablet 0    multivitamin with minerals tablet Take 1 tablet by mouth once daily.      ondansetron (ZOFRAN) 4 MG tablet Take 4 mg by mouth every 8 (eight) hours as needed for Nausea.      ondansetron (ZOFRAN-ODT) 4 MG TbDL Take 1 tablet (4 mg total) by mouth every 6 (six) hours as needed (P.r.n. nausea or vomiting). 20 tablet 1    ondansetron (ZOFRAN-ODT) 4 MG TbDL Take 1 tablet (4 mg total) by mouth every 6 (six) hours as needed (P.r.n. nausea or vomiting). 20 tablet 1    pantoprazole (PROTONIX) 40 MG tablet Take 1 tablet (40 mg total) by mouth 2 (two) times daily. 180 tablet 3    sucralfate (CARAFATE) 1 gram tablet Take 1 tablet (1 g total) by mouth 4 (four) times daily. Take 1 tablet with each meal and HS. Crush tablet and add 15-30 ml of water to crushed tablet. Drink slurry and wait 30 mins before eating or drinking. 120 tablet 2     No current facility-administered medications for this encounter.       Review of patient's allergies indicates:   Allergen Reactions    Ativan [lorazepam]     Prolixin [fluphenazine hcl]    .    Chemistry        Component Value Date/Time     09/08/2024 0200    K 3.8 09/08/2024 0200     (H) 09/08/2024 0200    CO2 27 09/08/2024 0200    BUN 12 09/08/2024 0200    CREATININE  0.64 09/08/2024 0200     (H) 09/08/2024 0200        Component Value Date/Time    CALCIUM 8.6 09/08/2024 0200    ALKPHOS 124 09/09/2024 0958    AST 10 (L) 09/09/2024 0958    ALT 11 (L) 09/09/2024 0958    BILITOT 0.4 09/09/2024 0958    ESTGFRAFRICA 80 04/06/2021 2330    EGFRNONAA 76 06/06/2022 0955        Lab Results   Component Value Date    WBC 12.69 (H) 09/08/2024    HGB 12.1 09/08/2024    HCT 37.6 (L) 09/08/2024     09/08/2024     Results for orders placed or performed during the hospital encounter of 08/25/24   EKG 12-lead    Collection Time: 08/25/24 11:22 AM   Result Value Ref Range    QRS Duration 78 ms    [facility] QTC Calculation 409 ms    Narrative    Test Reason : R00.0,    Vent. Rate : 106 BPM     Atrial Rate : 000 BPM     P-R Int : 160 ms          QRS Dur : 078 ms      QT Int : 328 ms       P-R-T Axes : 078 009 076 degrees     QTc Int : 409 ms    Sinus tachycardia  Anterior T wave abnormality is nonspecific  Borderline ECG    Confirmed by Angelo ORTIZ, Rehmat UJaron ([id number]) on 8/26/2024 7:53:34 PM    Referred By: [email or web address]   SELF           Confirmed By:Adal Stephens MD

## 2024-10-02 LAB
ESTROGEN SERPL-MCNC: NORMAL PG/ML
INSULIN SERPL-ACNC: NORMAL U[IU]/ML
LAB AP GROSS DESCRIPTION: NORMAL
LAB AP LABORATORY NOTES: NORMAL
T3RU NFR SERPL: NORMAL %

## 2024-10-15 ENCOUNTER — TELEPHONE (OUTPATIENT)
Dept: GASTROENTEROLOGY | Facility: CLINIC | Age: 69
End: 2024-10-15
Payer: MEDICARE

## 2024-10-15 NOTE — TELEPHONE ENCOUNTER
----- Message from Andres Spann MD sent at 10/14/2024 12:18 PM CDT -----  Please notify patient that biopsies from EGD were negative. Coffee ground emesis is related to esophagitis. Recommend Protonix 40 mg PO BID and Carafate 1 g PO QID. Repeat EGD in 3 months to ensure healing.

## 2024-11-11 NOTE — PLAN OF CARE
[facility] - [facility]  Initial Discharge Assessment       Primary Care Provider: Primary Doctor No    Admission Diagnosis: Dyspnea [R06.00]  Vomiting, unspecified vomiting type, unspecified whether nausea present [R11.10]    Admission Date: 5/20/2023  Expected Discharge Date:     Transition of Care Barriers: None    Payor: MEDICARE / Plan: MEDICARE PART A & B / Product Type: Government /     Extended Emergency Contact Information  Primary Emergency Contact: ALLIE HINKLE  Mobile Phone: [phone]  Relation: Sister  Preferred language: English   needed? No    Discharge Plan A: Return to nursing home  Discharge Plan B: Return to Nursing Home    No Pharmacies Listed    Initial Assessment (most recent)       Adult Discharge Assessment - 05/22/23 1200          Discharge Assessment    Assessment Type Discharge Planning Assessment     Source of Information family     If unable to respond/provide information was family/caregiver contacted? Yes     Contact Name/Number Allie Hinkle (sister) [phone]     Does patient/caregiver understand observation status Yes     Communicated AGUSTINA with patient/caregiver Date not available/Unable to determine     People in Home facility resident     Facility Arrived From: [facility]     Do you expect to return to your current living situation? Yes     Do you have help at home or someone to help you manage your care at home? Yes     Who are your caregiver(s) and their phone number(s)? FACILITY STAFF,Allie Manan (sister) [phone]     Prior to hospitilization cognitive status: Unable to Assess     Current cognitive status: Not Oriented to Person;Not Oriented to Place;Not Oriented to Time     Walking or Climbing Stairs ambulation difficulty, requires equipment;ambulation difficulty, assistance 1 person     Dressing/Bathing bathing difficulty, requires equipment;bathing difficulty, assistance 1 person     Home Accessibility wheelchair  accessible     Home Layout Able to live on 1st floor     Equipment Currently Used at Home wheelchair;hospital bed     Readmission within 30 days? No     Patient currently being followed by outpatient case management? Unable to determine (comments)     Do you currently have service(s) that help you manage your care at home? Yes     Name and Contact number of agency FACILITY RESIDENT     Is the pt/caregiver preference to resume services with current agency Yes     Do you take prescription medications? Yes     Do you have prescription coverage? Yes     Do you have any problems affording any of your prescribed medications? No     Is the patient taking medications as prescribed? yes     Who is going to help you get home at discharge? [facility]     How do you get to doctors appointments? agency;family or friend will provide     Are you on dialysis? No     Do you take coumadin? No     Discharge Plan A Return to nursing home     Discharge Plan B Return to Nursing Home     Discharge Plan discussed with: Sibling     Name(s) and Number(s) Allie Hinkle (sister) [phone]     Transition of Care Barriers None        Physical Activity    On average, how many days per week do you engage in moderate to strenuous exercise (like a brisk walk)? 0 days     On average, how many minutes do you engage in exercise at this level? 0 min        Financial Resource Strain    How hard is it for you to pay for the very basics like food, housing, medical care, and heating? Not hard at all        Housing Stability    In the last 12 months, was there a time when you were not able to pay the mortgage or rent on time? No     In the last 12 months, how many places have you lived? 1     In the last 12 months, was there a time when you did not have a steady place to sleep or slept in a shelter (including now)? No        Transportation Needs    In the past 12 months, has lack of transportation kept you from medical appointments or from getting medications?  No     In the past 12 months, has lack of transportation kept you from meetings, work, or from getting things needed for daily living? No        Food Insecurity    Within the past 12 months, you worried that your food would run out before you got the money to buy more. Never true     Within the past 12 months, the food you bought just didn't last and you didn't have money to get more. Never true        Stress    Do you feel stress - tense, restless, nervous, or anxious, or unable to sleep at night because your mind is troubled all the time - these days? Not at all        Social Connections    In a typical week, how many times do you talk on the phone with family, friends, or neighbors? Never     How often do you get together with friends or relatives? Never     How often do you attend Islam or [employer] services? Never     Do you belong to any clubs or organizations such as Islam groups, unions, fraternal or athletic groups, or school groups? No     How often do you attend meetings of the clubs or organizations you belong to? Never     Are you , , , , never , or living with a partner? Never         Alcohol Use    Q1: How often do you have a drink containing alcohol? Never     Q2: How many drinks containing alcohol do you have on a typical day when you are drinking? Patient does not drink     Q3: How often do you have six or more drinks on one occasion? Never        OTHER    Name(s) of People in Home FACILITY STAFF                   SW went to bedside to speak with pt and/or family. Pt unable to provide information. Kim f/u and spoke with Allie Hinkle (sister) [phone]. Pta, pt was long term resident at [facility]. Dcp is for pt to return to NH. Pt uses  and hospital bed while there. Kim f/u with Matti Urias ([facility] admissions coordinator) who stated that facility is expecting pt to return upon dc. Ss following for dc needs and recommendations.      [id number] KIM informed by MD that pt will dc on today. Kim f/u with Nurse Albin at  [facility], who is expecting pt to return on this day. Packet to be taken to nurse at this time. Sw to send dc orders when available. Ss following.              show

## (undated) DEVICE — SYRINGE 30CC LL

## (undated) DEVICE — SUTURE CHROMIC 3-0 27 FS-2

## (undated) DEVICE — NEEDLE ECLIPSE BLUNTFIL 18GX1.5 SAFETY

## (undated) DEVICE — CDS ENT

## (undated) DEVICE — SPONGE GAUZE 4X4 12 PLY STL AMD 10/TRAY

## (undated) DEVICE — GLOVE SURGICAL PROTEXIS PI SIZE 6.5

## (undated) DEVICE — NEEDLE DENTAL 25G LONG MONOJET (ORDER BY CS/100 ONLY)

## (undated) DEVICE — GLOVE SURGICAL PROTEXIS PI SIZE 8.0

## (undated) DEVICE — GLOVE SURGICAL PROTEXIS PI SIZE 6